# Patient Record
Sex: FEMALE | Race: WHITE | NOT HISPANIC OR LATINO | Employment: OTHER | URBAN - METROPOLITAN AREA
[De-identification: names, ages, dates, MRNs, and addresses within clinical notes are randomized per-mention and may not be internally consistent; named-entity substitution may affect disease eponyms.]

---

## 2017-01-30 DIAGNOSIS — I10 ESSENTIAL HYPERTENSION: ICD-10-CM

## 2017-01-30 RX ORDER — POTASSIUM CHLORIDE 750 MG/1
10 TABLET, EXTENDED RELEASE ORAL DAILY
Qty: 90 EACH | Refills: 1 | Status: SHIPPED | OUTPATIENT
Start: 2017-01-30 | End: 2017-07-18

## 2017-05-15 ENCOUNTER — HOSPITAL ENCOUNTER (OUTPATIENT)
Dept: LAB | Facility: MEDICAL CENTER | Age: 72
End: 2017-05-15
Attending: PHYSICIAN ASSISTANT
Payer: MEDICARE

## 2017-05-15 LAB
T4 FREE SERPL-MCNC: 1.17 NG/DL (ref 0.53–1.43)
TSH SERPL DL<=0.005 MIU/L-ACNC: 1.17 UIU/ML (ref 0.3–3.7)

## 2017-05-15 PROCEDURE — 84439 ASSAY OF FREE THYROXINE: CPT

## 2017-05-15 PROCEDURE — 84443 ASSAY THYROID STIM HORMONE: CPT

## 2017-05-15 PROCEDURE — 36415 COLL VENOUS BLD VENIPUNCTURE: CPT

## 2017-05-30 ENCOUNTER — TELEPHONE (OUTPATIENT)
Dept: MEDICAL GROUP | Facility: MEDICAL CENTER | Age: 72
End: 2017-05-30

## 2017-05-30 RX ORDER — SCOLOPAMINE TRANSDERMAL SYSTEM 1 MG/1
1 PATCH, EXTENDED RELEASE TRANSDERMAL
Qty: 4 PATCH | Refills: 0 | Status: SHIPPED | OUTPATIENT
Start: 2017-05-30 | End: 2017-05-31 | Stop reason: SDUPTHER

## 2017-05-30 NOTE — TELEPHONE ENCOUNTER
Pt is leaving on a cruise. Does not wish to take Dramamine because it makes her drowsy. Would like you to send patch to her pharmacy. Please advise.

## 2017-05-30 NOTE — TELEPHONE ENCOUNTER
Please notify patient I will send a prescription for the scopolamine patch for her pharmacy.  Have her schedule a Medicare wellness appointment, we have not seen her in over one year.

## 2017-05-31 ENCOUNTER — TELEPHONE (OUTPATIENT)
Dept: MEDICAL GROUP | Facility: MEDICAL CENTER | Age: 72
End: 2017-05-31

## 2017-05-31 RX ORDER — SCOLOPAMINE TRANSDERMAL SYSTEM 1 MG/1
1 PATCH, EXTENDED RELEASE TRANSDERMAL
Qty: 4 PATCH | Refills: 0 | Status: SHIPPED | OUTPATIENT
Start: 2017-05-31 | End: 2017-07-18

## 2017-05-31 NOTE — TELEPHONE ENCOUNTER
Patient called and left message  She would like the scopolamine patch sent to [facility], not [facility].  Please notify her that I have sent the scopolamine patch prescription to [facility]

## 2017-07-18 ENCOUNTER — OFFICE VISIT (OUTPATIENT)
Dept: MEDICAL GROUP | Facility: MEDICAL CENTER | Age: 72
End: 2017-07-18
Payer: MEDICARE

## 2017-07-18 VITALS
SYSTOLIC BLOOD PRESSURE: 128 MMHG | WEIGHT: 162 LBS | HEIGHT: 64 IN | BODY MASS INDEX: 27.66 KG/M2 | TEMPERATURE: 97.3 F | HEART RATE: 66 BPM | OXYGEN SATURATION: 95 % | DIASTOLIC BLOOD PRESSURE: 66 MMHG

## 2017-07-18 DIAGNOSIS — M85.80 OSTEOPENIA, UNSPECIFIED LOCATION: Chronic | ICD-10-CM

## 2017-07-18 DIAGNOSIS — D12.6 TUBULAR ADENOMA OF COLON: Chronic | ICD-10-CM

## 2017-07-18 DIAGNOSIS — M81.0 POSTMENOPAUSAL BONE LOSS: ICD-10-CM

## 2017-07-18 DIAGNOSIS — E55.9 HYPOVITAMINOSIS D: ICD-10-CM

## 2017-07-18 DIAGNOSIS — M54.5 LOW BACK PAIN, UNSPECIFIED BACK PAIN LATERALITY, UNSPECIFIED CHRONICITY, WITH SCIATICA PRESENCE UNSPECIFIED: ICD-10-CM

## 2017-07-18 DIAGNOSIS — Z85.850 HISTORY OF THYROID CANCER: Chronic | ICD-10-CM

## 2017-07-18 DIAGNOSIS — Z12.31 ENCOUNTER FOR SCREENING MAMMOGRAM FOR BREAST CANCER: ICD-10-CM

## 2017-07-18 DIAGNOSIS — Z00.00 MEDICARE ANNUAL WELLNESS VISIT, SUBSEQUENT: ICD-10-CM

## 2017-07-18 DIAGNOSIS — Z12.11 COLON CANCER SCREENING: ICD-10-CM

## 2017-07-18 DIAGNOSIS — E78.5 DYSLIPIDEMIA: Chronic | ICD-10-CM

## 2017-07-18 DIAGNOSIS — R35.1 NOCTURIA: ICD-10-CM

## 2017-07-18 DIAGNOSIS — Z00.00 PREVENTATIVE HEALTH CARE: Chronic | ICD-10-CM

## 2017-07-18 PROCEDURE — G0439 PPPS, SUBSEQ VISIT: HCPCS | Performed by: INTERNAL MEDICINE

## 2017-07-18 RX ORDER — ACYCLOVIR 400 MG/1
400 TABLET ORAL 2 TIMES DAILY
COMMUNITY

## 2017-07-18 ASSESSMENT — ENCOUNTER SYMPTOMS
WEIGHT LOSS: 0
BLURRED VISION: 0
HEADACHES: 0
DIZZINESS: 0
COUGH: 0
PALPITATIONS: 0
DEPRESSION: 0
DOUBLE VISION: 0
SHORTNESS OF BREATH: 0
BACK PAIN: 1
INSOMNIA: 0
ABDOMINAL PAIN: 0
HEARTBURN: 0
DIARRHEA: 0
CONSTIPATION: 0
NERVOUS/ANXIOUS: 0

## 2017-07-18 ASSESSMENT — PATIENT HEALTH QUESTIONNAIRE - PHQ9
SUM OF ALL RESPONSES TO PHQ QUESTIONS 1-9: 3
CLINICAL INTERPRETATION OF PHQ2 SCORE: 0

## 2017-07-18 NOTE — MR AVS SNAPSHOT
"        Ping Das   2017 8:20 AM   Office Visit   MRN: [id number]    Department:  [facility]   Dept Phone:  [phone]    Description:  Female : 1945   Provider:  Asael Redd M.D.           Reason for Visit     Annual Exam           Allergies as of 2017     Allergen Noted Reactions    Kenalog 05/15/2009       \"goes a little nuts\"      You were diagnosed with     Medicare annual wellness visit, subsequent   [751593]       Low back pain, unspecified back pain laterality, unspecified chronicity, with sciatica presence unspecified   [9005318]       Postmenopausal bone loss   [111033]       Encounter for screening mammogram for breast cancer   [2394311]       Tubular adenoma of colon   [121019]       Colon cancer screening   [516499]       Preventative health care   [491136]       Dyslipidemia   [372200]       Osteopenia, unspecified location   [4532873]       History of thyroid cancer   [139106]       Nocturia   [788.43.ICD-9-CM]       Hypovitaminosis D   [281480]         Vital Signs     Blood Pressure Pulse Temperature Height Weight Body Mass Index    128/66 mmHg 66 36.3 °C (97.3 °F) 1.626 m (5' 4\") 73.483 kg (162 lb) 27.79 kg/m2    Oxygen Saturation Smoking Status                95% Never Smoker           Basic Information     Date Of Birth Sex Race Ethnicity Preferred Language    1945 Female White Non- English      Problem List              ICD-10-CM Priority Class Noted - Resolved    History of thyroid cancer (Chronic) Z85.850   5/15/2009 - Present    Dyslipidemia (Chronic) E78.5   5/15/2009 - Present    History of depression Z86.59   5/15/2009 - Present    Tubular adenoma of colon (Chronic) D12.6   5/15/2009 - Present    Preventative health care (Chronic) Z00.00   5/15/2009 - Present    Osteopenia (Chronic) M85.80   2011 - Present    Ocular migraine G43.109   2013 - Present    History of compression fracture of spine Z87.81   3/7/2016 - Present    HSV " infection B00.9   5/10/2016 - Present      Health Maintenance        Date Due Completion Dates    COLONOSCOPY 4/30/2017 4/30/2012 (Done)    Override on 4/30/2012: Done    MAMMOGRAM 7/14/2017 7/14/2016, 6/12/2015, 8/28/2013, 1/19/2012, 12/7/2010, 10/22/2009, 10/22/2009, 10/17/2008, 10/17/2008, 2/20/2008, 2/20/2008, 6/6/2007, 5/21/2007, 5/21/2007    IMM INFLUENZA (1) 9/1/2017 11/23/2014    BONE DENSITY 6/12/2020 6/12/2015, 7/6/2011, 10/17/2008, 8/31/2004    IMM DTaP/Tdap/Td Vaccine (2 - Td) 1/19/2022 1/19/2012            Current Immunizations     13-VALENT PCV PREVNAR 2/5/2015    Influenza TIV (IM) 11/23/2014    Pneumococcal polysaccharide vaccine (PPSV-23) 1/19/2012    SHINGLES VACCINE 3/4/2014    Tdap Vaccine 1/19/2012      Below and/or attached are the medications your provider expects you to take. Review all of your home medications and newly ordered medications with your provider and/or pharmacist. Follow medication instructions as directed by your provider and/or pharmacist. Please keep your medication list with you and share with your provider. Update the information when medications are discontinued, doses are changed, or new medications (including over-the-counter products) are added; and carry medication information at all times in the event of emergency situations     Allergies:  KENALOG - (reactions not documented)               Medications  Valid as of: July 18, 2017 -  9:11 AM    Generic Name Brand Name Tablet Size Instructions for use    Acyclovir (Tab) ZOVIRAX 400 MG Take 400 mg by mouth 2 times a day.        Fluticasone Propionate (Suspension) FLONASE 50 MCG/ACT Spray 2 Sprays in nose every day.        Levothyroxine Sodium (Tab) SYNTHROID 100 MCG Take 100 mcg by mouth every day. Takes 1 1/2 tabs per day         Lovastatin (Tab) MEVACOR 40 MG Take 1 Tab by mouth every day.        Naproxen (Tab) NAPROSYN 500 MG Take 1 Tab by mouth 2 times a day as needed.        Potassium Chloride (Tab CR) KLOR-CON 10  MEQ Take 1 Tab by mouth every day.        .                 Medicines prescribed today were sent to:     [id number] - [place], [place] - [address] [place] [address]    Phone: [phone] Fax: [phone]    Open 24 Hours?: No      Medication refill instructions:       If your prescription bottle indicates you have medication refills left, it is not necessary to call your provider’s office. Please contact your pharmacy and they will refill your medication.    If your prescription bottle indicates you do not have any refills left, you may request refills at any time through one of the following ways: The online [employer] system (except Urgent Care), by calling your provider’s office, or by asking your pharmacy to contact your provider’s office with a refill request. Medication refills are processed only during regular business hours and may not be available until the next business day. Your provider may request additional information or to have a follow-up visit with you prior to refilling your medication.   *Please Note: Medication refills are assigned a new Rx number when refilled electronically. Your pharmacy may indicate that no refills were authorized even though a new prescription for the same medication is available at the pharmacy. Please request the medicine by name with the pharmacy before contacting your provider for a refill.        Your To Do List     Future Labs/Procedures Complete By Expires    CBC WITH DIFFERENTIAL  As directed 7/19/2018    COMP METABOLIC PANEL  As directed 7/19/2018    DS-BONE DENSITY STUDY (DEXA)  As directed 7/18/2018    LIPID PROFILE  As directed 7/19/2018    MA-MAMMO SCREENING BILAT W/GIOVANNI W/CAD  As directed 7/18/2018    URINALYSIS,CULTURE IF INDICATED  As directed 7/19/2018    VITAMIN D,25 HYDROXY  As directed 7/19/2018      Referral     A referral request has been sent to our patient care coordination department. Please allow 3-5 business days for  us to process this request and contact you either by phone or mail. If you do not hear from us by the 5th business day, please call us at [phone].        Other Notes About Your Plan     Need advance directive copy  7/18/17 [facility], mammogram, bone density, colon cancer screening pending                           [employer] Access Code: Activation code not generated  Current [employer] Status: Active

## 2017-07-18 NOTE — PROGRESS NOTES
Subjective:      Ping Das is a 71 y.o. female who presents with medicare wellness          Annual Exam  Pertinent negatives include no abdominal pain, chest pain, coughing or headaches.     CC:  Health Risk Assessment Exam.    HPI:  Ping is a 71 y.o. here for Health Risk Assessment.     PCP: Asael Redd M.D.  Other specialists:  endocrine  Sees gyn   GI: [facility]  Ophthal: uses glasses for reading and distance  No hearing loss   Teeth cleaning twice per year      Patient Active Problem List    Diagnosis Date Noted   • HSV infection 05/10/2016   • Thoracic compression fracture (CMS-HCC) 03/07/2016   • Ocular migraine 01/24/2013   • Osteopenia 07/06/2011   • History of thyroid cancer 05/15/2009   • Dyslipidemia 05/15/2009   • History of depression 05/15/2009   • Tubular adenoma of colon 05/15/2009   • Preventative health care 05/15/2009     Current Outpatient Prescriptions   Medication Sig Dispense Refill   • acyclovir (ZOVIRAX) 400 MG tablet Take 400 mg by mouth 2 times a day.     • potassium chloride ER (KLOR-CON) 10 MEQ tablet Take 1 Tab by mouth every day. 90 Tab 0   • lovastatin (MEVACOR) 40 MG tablet Take 1 Tab by mouth every day. 90 Tab 0   • naproxen (NAPROSYN) 500 MG Tab Take 1 Tab by mouth 2 times a day as needed. 60 Tab 5   • fluticasone (FLONASE) 50 MCG/ACT nasal spray Spray 2 Sprays in nose every day. 48 g 6   • levothyroxine (SYNTHROID) 100 MCG TABS Take 100 mcg by mouth every day. Takes 1 1/2 tabs per day      • scopolamine (TRANSDERM-SCOP) 1.5 MG/3DAYS PATCH 72 HR Apply 1 Patch to skin as directed every 72 hours. 4 Patch 0   • potassium chloride SA (K-DUR) 10 MEQ Tab CR Take 1 Tab by mouth every day. 90 Each 1   • naproxen (NAPROSYN) 500 MG Tab Take 500 mg by mouth 2 times a day, with meals.       No current facility-administered medications for this visit.      Current supplements: reviewed  Chronic narcotic pain medicines: no  Allergies:  Kenalog    Screening:reviewed  Depressive Symptoms: Denies feeling down, depressed or hopeless. Denies loss of interest or pleasure in doing things   ADLs: Denies needing help with using telephone, transportation, shopping, preparing meals, housework, laundry, or managing medication or money   Independent with bathing, hygiene, feeding, toileting, dressing    Memory concerns: Denies difficulty remembering details of conversations, events and upcoming appointments.  Hearing problems: Denies.   Recent falls no    SH no tobacco, occ etoh wine one per day at most, lives by self, does eat some sweets  FH one daughter in [place]   Current social contact/activities: reviewed , no regular exercise      Social History   Substance Use Topics   • Smoking status: Never Smoker    • Smokeless tobacco: Never Used   • Alcohol Use: 0.0 oz/week     0 Standard drinks or equivalent per week      Comment: occasional       Family History   Problem Relation Age of Onset   • Cancer Mother      unknown primary   • Heart Disease Sister      Past Surgical History   Procedure Laterality Date   • Other  Nov 2006     cataract   • Other  August 2007     thyroid   • Other  July 2006     Skin /face   • Vitrectomy posterior  11/17/2011     Performed by TRACEY RAMON at [facility]        Ostomy or other tubes or amputations: no  Chronic oxygen use no  Gait: normal. Uses assistive device no         Health Care Screening recommendations reviewed with patient today and updated or ordered.  DPA/Advanced directive: Completed/Information provided.  Referrals for PT/OT/Nutrition counseling/Behavioral Health/Smoking cessation as above if indicated  Discussion today about general wellness and lifestyle habits:    · Prevent falls and reduce trip hazards  · Have a working fire alarm and carbon monoxide detector  · Engage in regular physical activity and social activities  · Use sun protection when outdoors.   ·   ·         Current  Outpatient Prescriptions   Medication Sig Dispense Refill   • acyclovir (ZOVIRAX) 400 MG tablet Take 400 mg by mouth 2 times a day.     • potassium chloride ER (KLOR-CON) 10 MEQ tablet Take 1 Tab by mouth every day. 90 Tab 0   • lovastatin (MEVACOR) 40 MG tablet Take 1 Tab by mouth every day. 90 Tab 0   • naproxen (NAPROSYN) 500 MG Tab Take 1 Tab by mouth 2 times a day as needed. 60 Tab 5   • fluticasone (FLONASE) 50 MCG/ACT nasal spray Spray 2 Sprays in nose every day. 48 g 6   • levothyroxine (SYNTHROID) 100 MCG TABS Take 100 mcg by mouth every day. Takes 1 1/2 tabs per day      • scopolamine (TRANSDERM-SCOP) 1.5 MG/3DAYS PATCH 72 HR Apply 1 Patch to skin as directed every 72 hours. 4 Patch 0   • potassium chloride SA (K-DUR) 10 MEQ Tab CR Take 1 Tab by mouth every day. 90 Each 1   • naproxen (NAPROSYN) 500 MG Tab Take 500 mg by mouth 2 times a day, with meals.       No current facility-administered medications for this visit.     Patient Active Problem List    Diagnosis Date Noted   • HSV infection 05/10/2016   • Thoracic compression fracture (CMS-HCC) 03/07/2016   • Ocular migraine 01/24/2013   • Osteopenia 07/06/2011   • History of thyroid cancer 05/15/2009   • Dyslipidemia 05/15/2009   • History of depression 05/15/2009   • Tubular adenoma of colon 05/15/2009   • Preventative health care 05/15/2009         Tubular Adenoma  2004 no records path unknown  4/30/12 GI colon tubular adenoma, repeat 5 yrs    Thoracic compression fracture  11/10/04 thoracic x-ray negative  3/4/14 cxr mid apex right lateral curvature thoracic spine may be positional or from mild scoliosis  3/7/16 cxr mid and upper thoracic spine compression fracture    Preventative health  7/11 pap per gyn   1/19/12 pneumovax  1/12 tdap  4/30/12 GI colon tubular adenoma, repeat 5 yrs  3/5/14 zoster vaccine  2/5/15 prevnar  6/12/15 dexa LS -0.6,hip -1.3;FRAX 10.5% major,hip 1.7%  5/17/16 vit d 29 take extra 2000 units daily  7/15/16 mammogram  heterogeneously dense breast tissue offered sonocine  8/31/16 sonocine negative    Osteopenia  10/08 dexa LS -1.0, hip -0.4  7/11 dexa LS -0.5,hip -1.2  2/13 vit d 50  3/15/14 vit d 31  3/25/15 vit d 33  6/12/15 dexa LS -0.6,hip -1.3;FRAX 10.5% major,hip 1.7%  3/7/16 cxr mid and upper thoracic spine compression fracture  5/17/16 vit d 29 take extra 2000 units daily    Ocular migraine    HSV acyclovir as needed    History thyroid cancer  8/07 s/p thyroidectomy 1.1 cm no evid of adenopathy sees   3/11 dr.abbott saunders note u/s thyroid in june, on levoxyl 100 mg  6/11 tsh 0.33, on levoxyl 100 mcg  1/12 tsh 3.9 on levoxyl 100 mcg followed by   8/12 dr.abbott saunders note, tsh 0.05,free t4 1.46, thyroglobulin <0.9, decrease levothyroxine to 100 mcg. Repeat thyroid ultrasound February 1/13 tsh 0.1, thyroglobulin less than 0.1; dr.abbott saunders note no changes  1/29/14 tsh 0.17,free t4 1.0,thyroglobulin <0.1 on levothyroxine 100 mcg  2/25/14 tsh 0.12, free t4, free t4 1.2,thyroglobulin 0.1 on levothyroxine 100 mcg  9/5/14 tsh 0.13,free t4 1.1 on levothyroxine 100 mcg one pill six days per week ,1/2 pill on day 7  9/20/14  endocrine note  1/6/15 tsh 1.5,free t4 0.99 on levothyroxine 100 mcg 6 days per week and 75 mcg one day per week  1/13/15  endocrine note  3/24/15 tsh 0.3,free t4 1.5 on levothyroxine 100 mcg 6 days per week  4/2/15  endocrine note continue same dose levothyroxine and follow up 6 months  9/30/15 tsh 0.09,free t4 1.3, thyroglobulin<0.1, antithyroglobulin antibody less<0.9; on synthroid 100 mcg 6 days a week, synthroid 75 mcg one day per week  10/6/15  endocrinology note decrease total thyroid dose by 25 mcg during the week  5/17/16 tsh 0.6,free t4 0.9 on synthroid 100 mcg 6 days per week and 50 mcg 1 day per week? Per  endocrinology  5/15/17 tsh 1.1,free t4 1.1 on levothyroxine 100 mcg six days per week  5/18/17  endocrine note change to  levothyroxine 75 mcg daily     History depression  Since 2001, having failed celexa and effexor?, lexapro working well  4/10 samples cymbalta  6/11 off cymbalta  7/24/14 trial wellbutrin 150 mg, PHQ-9 17  8/19/14 behavioral health note; pt will follow up with psychotherapy  9/22/14 increase wellbutrin to 300 mgdepression  10/21/14 PHQ 14  10/21/14 decrease wellbutrin to 150 mg and add effexor XR 75 mg qday  1/25/15 off wellbutrin    Dyslipidemia  2/09 chol 204,trig 130, hdl 62, ldl 116  5/10 chol 142,trig 63,hdl 64,ldl 65  6/11 chol 120,trig 65,hdl 68,ldl 39 on mevacor and niaspan  1/12 chol 98,trig 55,hdl 40,ldl 47 on mevacor and niaspan; ok to stop niaspan  2/13 chol 172,trig 314,hdl 45,ldl 64 on mevacor 40 mg; start fish oil 2 bid   3/15/14 chol 198,trig 101,hdl 54,ldl 124 on mevacor 40 mg and fish oil  7/29/14 dietary evaluation and education  3/25/15 chol 175,trig 107,hdl 54,ldl 100 on mevacor 40 mg  5/17/16 chol 196,trig 105,hdl 52,ldl 123 on mevacor 40 mg      Depression Screening    Little interest or pleasure in doing things?  0 - not at all  Feeling down, depressed , or hopeless? 0 - not at all        Screening for Cognitive Impairment    Three Minute Recall (apple, watch, pierre)   3/3    Draw clock face with all 12 numbers set to the hand to show 10 minutes past 11 o'clock  1    Cognitive concerns identified deferred for follow up unless specifically addressed in assessment and plan.    Fall Risk Assessment    Has the patient had two or more falls in the last year or any fall with injury in the last year?  No    Safety Assessment    Throw rugs on floor.  Yes  Handrails on all stairs.  No  Good lighting in all hallways.  Yes  Difficulty hearing.  No  Patient counseled about all safety risks that were identified.    Functional Assessment ADLs    Are there any barriers preventing you from cooking for yourself or meeting nutritional needs?  No.    Are there any barriers preventing you from driving safely or  "obtaining transportation?  No.    Are there any barriers preventing you from using a telephone or calling for help?  No.    Are there any barriers preventing you from shopping?  No.    Are there any barriers preventing you from taking care of your own finances?  No.    Are there any barriers preventing you from managing your medications?  No.    Are currently engaging any exercise or physical activity?  No.       Health Maintenance Summary                COLONOSCOPY Overdue 4/30/2017      Done 4/30/2012     Annual Wellness Visit Overdue 5/11/2017      Done 5/10/2016 Visit Dx: Medicare annual wellness visit, subsequent    MAMMOGRAM Overdue 7/14/2017      Done 7/14/2016 MA-SCREEN MAMMO W/CAD-BILAT     Patient has more history with this topic...    IMM INFLUENZA Next Due 9/1/2017      Done 11/23/2014 Imm Admin: Influenza TIV (IM)    BONE DENSITY Next Due 6/12/2020      Done 6/12/2015 DS-BONE DENSITY STUDY (DEXA)     Patient has more history with this topic...    IMM DTaP/Tdap/Td Vaccine Next Due 1/19/2022      Done 1/19/2012 Imm Admin: Tdap Vaccine          Patient Care Team:  Asael Redd M.D. as PCP - General    Review of Systems   Constitutional: Negative for weight loss and malaise/fatigue.   Eyes: Negative for blurred vision and double vision.   Respiratory: Negative for cough and shortness of breath.    Cardiovascular: Negative for chest pain and palpitations.   Gastrointestinal: Negative for heartburn, abdominal pain, diarrhea and constipation.   Genitourinary: Negative for frequency.   Musculoskeletal: Positive for back pain.   Neurological: Negative for dizziness and headaches.   Endo/Heme/Allergies: Negative for environmental allergies.   Psychiatric/Behavioral: Negative for depression. The patient is not nervous/anxious and does not have insomnia.           Objective:     /66 mmHg  Pulse 66  Temp(Src) 36.3 °C (97.3 °F)  Ht 1.626 m (5' 4\")  Wt 73.483 kg (162 lb)  BMI 27.79 kg/m2  SpO2 95% "     Physical Exam   Constitutional: She appears well-developed and well-nourished. No distress.   HENT:   Head: Normocephalic and atraumatic.   Right Ear: External ear normal.   Left Ear: External ear normal.   Eyes: Conjunctivae are normal. Right eye exhibits no discharge. Left eye exhibits no discharge.   Neck: Neck supple. No JVD present. No thyromegaly present.   Cardiovascular: Normal rate and regular rhythm.    No murmur heard.  Pulmonary/Chest: No respiratory distress. She has no wheezes.   Abdominal: She exhibits no distension.   Musculoskeletal: She exhibits no edema.   Neurological: She is alert.   Skin: Skin is warm. She is not diaphoretic.   Psychiatric: She has a normal mood and affect. Her behavior is normal.   Nursing note and vitals reviewed.              Assessment/Plan:       Assessment  #! Wellness assessment    #2 Osteopenia last bone density 6/12/15 dexa LS -0.6,hip -1.3;FRAX 10.5% major,hip 1.7% with history of compression fracture 3/7/16 cxr mid and upper thoracic spine compression fracture, most recent vitamin D level 5/17/16 vit d 29 take extra 2000 units daily, due for bone density    #3 History thyroid cancer followed by endocrinology 8/07 s/p thyroidectomy 1.1 cm no evid of adenopathy sees  5/15/17 tsh 1.1,free t4 1.1 on levothyroxine 100 mcg six days per week 5/18/17  endocrine note change to levothyroxine 75 mcg daily from most recent visit stable     #4 Dyslipidemia most recent lipid panel 5/17/16 chol 196,trig 105,hdl 52,ldl 123 on mevacor 40 mg, no recent labs, no muscle pains or aches on statin    #5 Tubular Adenoma 4/30/12 [facility] colon tubular adenoma, repeat 5 yrs, no abdominal pain, melena or hematochezia     #6 Preventative health  7/11 pap per gyn   1/19/12 pneumovax  1/12 tdap  4/30/12 [facility] colon tubular adenoma, repeat 5 yrs  3/5/14 zoster vaccine  2/5/15 prevnar  6/12/15 dexa LS -0.6,hip -1.3;FRAX 10.5% major,hip 1.7%  5/17/16 vit d 29 take extra 2000  units daily  7/15/16 mammogram heterogeneously dense breast tissue offered sonocine  8/31/16 sonocine negative    #7 occasional low back pain      Plan  #! Does have advanced directive and medical power of  she will drop off a copy for us    #2 referral [facility] back pain PT    #3 declines hearing test    #4 colon cancer screening [facility] due    #5 labs    #6 mammogram and dexa    #7 lifestyle modification, diet, exercise discussed    #8 follow-up endocrinology    #9 follow-up gynecology    #10 continue Mevacor pending labs    #11 continue Synthroid per endocrine    #12 follow-up one year

## 2017-08-22 ENCOUNTER — HOSPITAL ENCOUNTER (OUTPATIENT)
Dept: LAB | Facility: MEDICAL CENTER | Age: 72
End: 2017-08-22
Attending: INTERNAL MEDICINE
Payer: MEDICARE

## 2017-08-22 LAB
T4 FREE SERPL-MCNC: 1.03 NG/DL (ref 0.53–1.43)
TSH SERPL DL<=0.005 MIU/L-ACNC: 1.66 UIU/ML (ref 0.3–3.7)

## 2017-08-22 PROCEDURE — 84439 ASSAY OF FREE THYROXINE: CPT

## 2017-08-22 PROCEDURE — 36415 COLL VENOUS BLD VENIPUNCTURE: CPT

## 2017-08-22 PROCEDURE — 84443 ASSAY THYROID STIM HORMONE: CPT

## 2017-09-05 ENCOUNTER — HOSPITAL ENCOUNTER (OUTPATIENT)
Dept: LAB | Facility: MEDICAL CENTER | Age: 72
End: 2017-09-05
Attending: INTERNAL MEDICINE
Payer: MEDICARE

## 2017-09-05 ENCOUNTER — TELEPHONE (OUTPATIENT)
Dept: MEDICAL GROUP | Facility: MEDICAL CENTER | Age: 72
End: 2017-09-05

## 2017-09-05 ENCOUNTER — HOSPITAL ENCOUNTER (OUTPATIENT)
Dept: RADIOLOGY | Facility: MEDICAL CENTER | Age: 72
End: 2017-09-05
Attending: INTERNAL MEDICINE
Payer: MEDICARE

## 2017-09-05 DIAGNOSIS — Z12.31 ENCOUNTER FOR SCREENING MAMMOGRAM FOR BREAST CANCER: ICD-10-CM

## 2017-09-05 DIAGNOSIS — E55.9 HYPOVITAMINOSIS D: ICD-10-CM

## 2017-09-05 DIAGNOSIS — M81.0 POSTMENOPAUSAL BONE LOSS: ICD-10-CM

## 2017-09-05 DIAGNOSIS — R35.1 NOCTURIA: ICD-10-CM

## 2017-09-05 DIAGNOSIS — E78.5 DYSLIPIDEMIA: Chronic | ICD-10-CM

## 2017-09-05 LAB
25(OH)D3 SERPL-MCNC: 42 NG/ML (ref 30–100)
ALBUMIN SERPL BCP-MCNC: 4.2 G/DL (ref 3.2–4.9)
ALBUMIN/GLOB SERPL: 1.4 G/DL
ALP SERPL-CCNC: 69 U/L (ref 30–99)
ALT SERPL-CCNC: 14 U/L (ref 2–50)
ANION GAP SERPL CALC-SCNC: 7 MMOL/L (ref 0–11.9)
APPEARANCE UR: CLEAR
AST SERPL-CCNC: 17 U/L (ref 12–45)
BACTERIA #/AREA URNS HPF: ABNORMAL /HPF
BASOPHILS # BLD AUTO: 0.7 % (ref 0–1.8)
BASOPHILS # BLD: 0.05 K/UL (ref 0–0.12)
BILIRUB SERPL-MCNC: 0.6 MG/DL (ref 0.1–1.5)
BILIRUB UR QL STRIP.AUTO: NEGATIVE
BUN SERPL-MCNC: 15 MG/DL (ref 8–22)
CALCIUM SERPL-MCNC: 9.2 MG/DL (ref 8.5–10.5)
CHLORIDE SERPL-SCNC: 103 MMOL/L (ref 96–112)
CHOLEST SERPL-MCNC: 206 MG/DL (ref 100–199)
CO2 SERPL-SCNC: 28 MMOL/L (ref 20–33)
COLOR UR: YELLOW
CREAT SERPL-MCNC: 0.55 MG/DL (ref 0.5–1.4)
CULTURE IF INDICATED INDCX: YES UA CULTURE
EOSINOPHIL # BLD AUTO: 0.12 K/UL (ref 0–0.51)
EOSINOPHIL NFR BLD: 1.6 % (ref 0–6.9)
EPI CELLS #/AREA URNS HPF: ABNORMAL /HPF
ERYTHROCYTE [DISTWIDTH] IN BLOOD BY AUTOMATED COUNT: 42.5 FL (ref 35.9–50)
FASTING STATUS PATIENT QL REPORTED: NORMAL
GFR SERPL CREATININE-BSD FRML MDRD: >60 ML/MIN/1.73 M 2
GLOBULIN SER CALC-MCNC: 2.9 G/DL (ref 1.9–3.5)
GLUCOSE SERPL-MCNC: 101 MG/DL (ref 65–99)
GLUCOSE UR STRIP.AUTO-MCNC: NEGATIVE MG/DL
HCT VFR BLD AUTO: 44.2 % (ref 37–47)
HDLC SERPL-MCNC: 48 MG/DL
HGB BLD-MCNC: 15.1 G/DL (ref 12–16)
HYALINE CASTS #/AREA URNS LPF: ABNORMAL /LPF
IMM GRANULOCYTES # BLD AUTO: 0.02 K/UL (ref 0–0.11)
IMM GRANULOCYTES NFR BLD AUTO: 0.3 % (ref 0–0.9)
KETONES UR STRIP.AUTO-MCNC: NEGATIVE MG/DL
LDLC SERPL CALC-MCNC: 130 MG/DL
LEUKOCYTE ESTERASE UR QL STRIP.AUTO: ABNORMAL
LYMPHOCYTES # BLD AUTO: 2.1 K/UL (ref 1–4.8)
LYMPHOCYTES NFR BLD: 28.2 % (ref 22–41)
MCH RBC QN AUTO: 30.4 PG (ref 27–33)
MCHC RBC AUTO-ENTMCNC: 34.2 G/DL (ref 33.6–35)
MCV RBC AUTO: 88.9 FL (ref 81.4–97.8)
MICRO URNS: ABNORMAL
MONOCYTES # BLD AUTO: 0.59 K/UL (ref 0–0.85)
MONOCYTES NFR BLD AUTO: 7.9 % (ref 0–13.4)
NEUTROPHILS # BLD AUTO: 4.56 K/UL (ref 2–7.15)
NEUTROPHILS NFR BLD: 61.3 % (ref 44–72)
NITRITE UR QL STRIP.AUTO: NEGATIVE
NRBC # BLD AUTO: 0 K/UL
NRBC BLD AUTO-RTO: 0 /100 WBC
PH UR STRIP.AUTO: 5.5 [PH]
PLATELET # BLD AUTO: 287 K/UL (ref 164–446)
PMV BLD AUTO: 10 FL (ref 9–12.9)
POTASSIUM SERPL-SCNC: 3.9 MMOL/L (ref 3.6–5.5)
PROT SERPL-MCNC: 7.1 G/DL (ref 6–8.2)
PROT UR QL STRIP: NEGATIVE MG/DL
RBC # BLD AUTO: 4.97 M/UL (ref 4.2–5.4)
RBC # URNS HPF: ABNORMAL /HPF
RBC UR QL AUTO: NEGATIVE
SODIUM SERPL-SCNC: 138 MMOL/L (ref 135–145)
SP GR UR STRIP.AUTO: 1.03
TRIGL SERPL-MCNC: 139 MG/DL (ref 0–149)
UROBILINOGEN UR STRIP.AUTO-MCNC: 0.2 MG/DL
WBC # BLD AUTO: 7.4 K/UL (ref 4.8–10.8)
WBC #/AREA URNS HPF: ABNORMAL /HPF

## 2017-09-05 PROCEDURE — G0202 SCR MAMMO BI INCL CAD: HCPCS

## 2017-09-05 PROCEDURE — 77080 DXA BONE DENSITY AXIAL: CPT

## 2017-09-06 ENCOUNTER — TELEPHONE (OUTPATIENT)
Dept: MEDICAL GROUP | Facility: MEDICAL CENTER | Age: 72
End: 2017-09-06

## 2017-09-06 DIAGNOSIS — R92.2 DENSE BREAST: ICD-10-CM

## 2017-09-06 DIAGNOSIS — R92.30 DENSE BREAST: ICD-10-CM

## 2017-09-06 NOTE — TELEPHONE ENCOUNTER
----- Message from Asael Redd M.D. sent at 9/6/2017 11:00 AM PDT -----  Please notify the patient that:  (1) her mammogram is negative but does show dense breast tissue which may decrease the accuracy of the mammogram  (2) she can get a screening ultrasound of her breast which may provide further detail  (3) her insurance will not cover a screening breast ultrasound, she would have to pay out of pocket, the cost would be approximately $125  (4) she did have a screening breast ultrasound last year which was negative, I will leave that up to her if she would like to repeat that test however given that she did have the test negative one year ago, I believe she can defer a screening ultrasound this year

## 2017-09-06 NOTE — TELEPHONE ENCOUNTER
Called patient and left message twice  Please notify her that her bone density test shows:  (1) no significant change from her previous bone density test  (2) the strength of her spine is normal, she has a slight decrease in strength of her hip bone but it is not significant  (3) have her continue the vitamin D, exercise, and we will repeat her bone density test in 2 years

## 2017-09-06 NOTE — TELEPHONE ENCOUNTER
Please notify the patient that:  (1) her mammogram is negative but does show dense breast tissue which may decrease the accuracy of the mammogram  (2) she can get a screening ultrasound of her breast which may provide further detail  (3) her insurance will not cover a screening breast ultrasound, she would have to pay out of pocket, the cost would be approximately $125  (4) she did have a screening breast ultrasound last year which was negative, I will leave that up to her if she would like to repeat that test however given that she did have the test negative one year ago, I believe she can defer a screening ultrasound this year

## 2017-09-06 NOTE — TELEPHONE ENCOUNTER
Called patient and left message x 2  Please notify her that her tests show:  (1) normal liver function, kidney function, and her blood sugar is minimally elevated at 101 (normal is 100 or less)  (2) total cholesterol is 206, good cholesterol is 48 (goal is higher than 40), bad cholesterol is 130 (goal is 100 or less), her cholesterol has continued to increase over the last 2 years.  I would recommend trying a slightly stronger cholesterol medication to see if we can decrease her bad cholesterol closer to 100.  (3) if she is interested let me know, and I will send a prescription for Lipitor to her pharmacy in place of the Mevacor  (4) her vitamin D level is normal  (5) her urine test shows some white blood cells which may indicate an infection, we are awaiting the final results

## 2017-09-07 ENCOUNTER — TELEPHONE (OUTPATIENT)
Dept: MEDICAL GROUP | Facility: MEDICAL CENTER | Age: 72
End: 2017-09-07

## 2017-09-07 DIAGNOSIS — R31.29 MICROSCOPIC HEMATURIA: ICD-10-CM

## 2017-09-07 LAB
BACTERIA UR CULT: NORMAL
SIGNIFICANT IND 70042: NORMAL
SOURCE SOURCE: NORMAL

## 2017-09-07 NOTE — TELEPHONE ENCOUNTER
----- Message from Asael Redd M.D. sent at 9/7/2017 12:21 PM PDT -----  Please notify patient that her urine culture is negative    I would like to repeat her urine test in 2 weeks, she can go to the lab at her convenience, the order is in the system

## 2017-09-07 NOTE — TELEPHONE ENCOUNTER
Please notify patient that her urine culture is negative    I would like to repeat her urine test in 2 weeks, she can go to the lab at her convenience, the order is in the system

## 2017-09-08 DIAGNOSIS — E78.5 DYSLIPIDEMIA: Chronic | ICD-10-CM

## 2017-09-08 RX ORDER — ATORVASTATIN CALCIUM 20 MG/1
20 TABLET, FILM COATED ORAL DAILY
Qty: 30 TAB | Refills: 3 | Status: SHIPPED | OUTPATIENT
Start: 2017-09-08 | End: 2018-01-23 | Stop reason: SDUPTHER

## 2017-09-08 NOTE — TELEPHONE ENCOUNTER
Pt called back, notified of culture coming back negative. Pt will go to the lab in two weeks to retest.

## 2017-09-08 NOTE — TELEPHONE ENCOUNTER
Please notify the patient that I sent a prescription for Lipitor to her pharmacy for one month, she can repeat her blood test at that time, the orders fasting are in the computer system, if her cholesterol looks better and she has no side effects with the medication, at that time we could consider changing to a 90 day supply

## 2017-09-25 ENCOUNTER — APPOINTMENT (RX ONLY)
Dept: URBAN - METROPOLITAN AREA CLINIC 4 | Facility: CLINIC | Age: 72
Setting detail: DERMATOLOGY
End: 2017-09-25

## 2017-09-25 DIAGNOSIS — D18.0 HEMANGIOMA: ICD-10-CM

## 2017-09-25 DIAGNOSIS — D22 MELANOCYTIC NEVI: ICD-10-CM

## 2017-09-25 DIAGNOSIS — L81.4 OTHER MELANIN HYPERPIGMENTATION: ICD-10-CM

## 2017-09-25 DIAGNOSIS — L57.0 ACTINIC KERATOSIS: ICD-10-CM

## 2017-09-25 DIAGNOSIS — L90.5 SCAR CONDITIONS AND FIBROSIS OF SKIN: ICD-10-CM

## 2017-09-25 DIAGNOSIS — L82.1 OTHER SEBORRHEIC KERATOSIS: ICD-10-CM

## 2017-09-25 PROBLEM — D23.71 OTHER BENIGN NEOPLASM OF SKIN OF RIGHT LOWER LIMB, INCLUDING HIP: Status: ACTIVE | Noted: 2017-09-25

## 2017-09-25 PROBLEM — C44.712 BASAL CELL CARCINOMA OF SKIN OF RIGHT LOWER LIMB, INCLUDING HIP: Status: ACTIVE | Noted: 2017-09-25

## 2017-09-25 PROBLEM — D22.9 MELANOCYTIC NEVI, UNSPECIFIED: Status: ACTIVE | Noted: 2017-09-25

## 2017-09-25 PROBLEM — D18.01 HEMANGIOMA OF SKIN AND SUBCUTANEOUS TISSUE: Status: ACTIVE | Noted: 2017-09-25

## 2017-09-25 PROCEDURE — 11100: CPT | Mod: 59

## 2017-09-25 PROCEDURE — ? OBSERVATION

## 2017-09-25 PROCEDURE — ? BIOPSY BY SHAVE METHOD

## 2017-09-25 PROCEDURE — 17000 DESTRUCT PREMALG LESION: CPT

## 2017-09-25 PROCEDURE — 99213 OFFICE O/P EST LOW 20 MIN: CPT | Mod: 25

## 2017-09-25 PROCEDURE — ? COUNSELING

## 2017-09-25 PROCEDURE — ? LIQUID NITROGEN

## 2017-09-25 PROCEDURE — ? DIAGNOSIS COMMENT

## 2017-09-25 PROCEDURE — 17003 DESTRUCT PREMALG LES 2-14: CPT

## 2017-09-25 ASSESSMENT — LOCATION ZONE DERM
LOCATION ZONE: NECK
LOCATION ZONE: LEG
LOCATION ZONE: TRUNK

## 2017-09-25 ASSESSMENT — LOCATION DETAILED DESCRIPTION DERM
LOCATION DETAILED: LEFT CLAVICULAR NECK
LOCATION DETAILED: RIGHT ANTERIOR PROXIMAL THIGH
LOCATION DETAILED: RIGHT DISTAL LATERAL POSTERIOR THIGH
LOCATION DETAILED: LEFT POSTERIOR ANKLE
LOCATION DETAILED: LEFT SUPERIOR UPPER BACK
LOCATION DETAILED: RIGHT PROXIMAL PRETIBIAL REGION

## 2017-09-25 ASSESSMENT — LOCATION SIMPLE DESCRIPTION DERM
LOCATION SIMPLE: RIGHT PRETIBIAL REGION
LOCATION SIMPLE: LEFT ANKLE
LOCATION SIMPLE: LEFT ANTERIOR NECK
LOCATION SIMPLE: RIGHT POSTERIOR THIGH
LOCATION SIMPLE: RIGHT THIGH
LOCATION SIMPLE: LEFT UPPER BACK

## 2017-09-25 NOTE — PROCEDURE: BIOPSY BY SHAVE METHOD
Anesthesia Type: 1% lidocaine with epinephrine
Notification Instructions: Patient will be notified of biopsy results. However, patient instructed to call the office if not contacted within 2 weeks.
Type Of Destruction Used: Cryotherapy
Electrodesiccation And Curettage Text: The wound bed was treated with electrodesiccation and curettage after the biopsy was performed.
Biopsy Type: H and E
Wound Care: Petrolatum
Destruction After The Procedure: No
Additional Anesthesia Volume In Cc (Will Not Render If 0): 0
Detail Level: Detailed
Hemostasis: Drysol
Cryotherapy Text: The wound bed was treated with cryotherapy after the biopsy was performed.
Billing Type: Third-Party Bill
Electrodesiccation Text: The wound bed was treated with electrodesiccation after the biopsy was performed.
Lab: [id number]
Biopsy Method: Personna blade
Silver Nitrate Text: The wound bed was treated with silver nitrate after the biopsy was performed.
Curettage Text: The wound bed was treated with curettage after the biopsy was performed.
Consent: Written consent was obtained and risks were reviewed including but not limited to scarring, infection, bleeding, scabbing, incomplete removal, nerve damage and allergy to anesthesia.
Size Of Lesion In Cm: 7
Dressing: bandage
Post-Care Instructions: I reviewed with the patient in detail post-care instructions. Patient is to keep the biopsy site dry overnight, and then apply bacitracin twice daily until healed. Patient may apply hydrogen peroxide soaks to remove any crusting.

## 2017-09-26 ENCOUNTER — TELEPHONE (OUTPATIENT)
Dept: RADIOLOGY | Facility: MEDICAL CENTER | Age: 72
End: 2017-09-26

## 2017-09-27 ENCOUNTER — HOSPITAL ENCOUNTER (OUTPATIENT)
Dept: LAB | Facility: MEDICAL CENTER | Age: 72
End: 2017-09-27
Attending: INTERNAL MEDICINE
Payer: MEDICARE

## 2017-09-27 ENCOUNTER — HOSPITAL ENCOUNTER (OUTPATIENT)
Dept: RADIOLOGY | Facility: MEDICAL CENTER | Age: 72
End: 2017-09-27
Attending: INTERNAL MEDICINE
Payer: COMMERCIAL

## 2017-09-27 ENCOUNTER — TELEPHONE (OUTPATIENT)
Dept: MEDICAL GROUP | Facility: MEDICAL CENTER | Age: 72
End: 2017-09-27

## 2017-09-27 DIAGNOSIS — R31.29 MICROSCOPIC HEMATURIA: ICD-10-CM

## 2017-09-27 DIAGNOSIS — R92.2 DENSE BREAST: ICD-10-CM

## 2017-09-27 DIAGNOSIS — R92.30 DENSE BREAST: ICD-10-CM

## 2017-09-27 LAB
APPEARANCE UR: CLEAR
BACTERIA #/AREA URNS HPF: NEGATIVE /HPF
BILIRUB UR QL STRIP.AUTO: NEGATIVE
COLOR UR: YELLOW
EPI CELLS #/AREA URNS HPF: ABNORMAL /HPF
GLUCOSE UR STRIP.AUTO-MCNC: NEGATIVE MG/DL
HYALINE CASTS #/AREA URNS LPF: ABNORMAL /LPF
KETONES UR STRIP.AUTO-MCNC: NEGATIVE MG/DL
LEUKOCYTE ESTERASE UR QL STRIP.AUTO: ABNORMAL
MICRO URNS: ABNORMAL
NITRITE UR QL STRIP.AUTO: NEGATIVE
PH UR STRIP.AUTO: 5 [PH]
PROT UR QL STRIP: NEGATIVE MG/DL
RBC # URNS HPF: ABNORMAL /HPF
RBC UR QL AUTO: NEGATIVE
SP GR UR STRIP.AUTO: 1.02
UROBILINOGEN UR STRIP.AUTO-MCNC: 0.2 MG/DL
WBC #/AREA URNS HPF: ABNORMAL /HPF

## 2017-09-27 PROCEDURE — 4410555 US-SCREENING BREAST (SONOCINE - SP)

## 2017-09-27 PROCEDURE — 81001 URINALYSIS AUTO W/SCOPE: CPT

## 2017-09-27 NOTE — TELEPHONE ENCOUNTER
Called patient and left message, please notify her that the screening breast ultrasound is negative, she can resume her annual mammogram next year

## 2017-09-28 ENCOUNTER — TELEPHONE (OUTPATIENT)
Dept: MEDICAL GROUP | Facility: MEDICAL CENTER | Age: 72
End: 2017-09-28

## 2017-09-28 NOTE — TELEPHONE ENCOUNTER
----- Message from Asael Redd M.D. sent at 9/27/2017  4:21 PM PDT -----  Called patient and left message, please notify her that the screening breast ultrasound is negative, she can resume her annual mammogram next year

## 2017-10-02 ENCOUNTER — TELEPHONE (OUTPATIENT)
Dept: MEDICAL GROUP | Facility: MEDICAL CENTER | Age: 72
End: 2017-10-02

## 2017-10-04 ENCOUNTER — HOSPITAL ENCOUNTER (OUTPATIENT)
Dept: RADIOLOGY | Facility: MEDICAL CENTER | Age: 72
End: 2017-10-04
Attending: INTERNAL MEDICINE
Payer: MEDICARE

## 2017-10-04 ENCOUNTER — TELEPHONE (OUTPATIENT)
Dept: MEDICAL GROUP | Facility: MEDICAL CENTER | Age: 72
End: 2017-10-04

## 2017-10-04 DIAGNOSIS — R31.29 MICROSCOPIC HEMATURIA: ICD-10-CM

## 2017-10-04 PROCEDURE — 76775 US EXAM ABDO BACK WALL LIM: CPT

## 2017-10-05 NOTE — TELEPHONE ENCOUNTER
Notified patient, renal ultrasound negative, will repeat urinalysis in 2 weeks, order placed in computer  If continues to have persistent microscopic hematuria, next step would be CT urogram, and urology evaluation  Order placed in the computer system for urinalysis, she will get that done in 2 weeks  Patient agrees

## 2017-10-23 ENCOUNTER — APPOINTMENT (RX ONLY)
Dept: URBAN - METROPOLITAN AREA CLINIC 4 | Facility: CLINIC | Age: 72
Setting detail: DERMATOLOGY
End: 2017-10-23

## 2017-10-23 PROBLEM — C44.712 BASAL CELL CARCINOMA OF SKIN OF RIGHT LOWER LIMB, INCLUDING HIP: Status: ACTIVE | Noted: 2017-10-23

## 2017-10-23 PROCEDURE — ? EXCISION

## 2017-10-23 PROCEDURE — 11602 EXC TR-EXT MAL+MARG 1.1-2 CM: CPT

## 2017-10-23 PROCEDURE — ? ADDITIONAL NOTES

## 2017-10-23 PROCEDURE — 12032 INTMD RPR S/A/T/EXT 2.6-7.5: CPT

## 2017-10-23 NOTE — PROCEDURE: ADDITIONAL NOTES
Additional Notes: There is an adjacent similar lesion in close proximity that was excised with the original lesion to avoid a positive margin.

## 2017-10-23 NOTE — PROCEDURE: EXCISION
Dorsal Nasal Flap Text: The defect edges were debeveled with a #15 scalpel blade.  Given the location of the defect and the proximity to free margins a dorsal nasal flap was deemed most appropriate.  Using a sterile surgical marker, an appropriate dorsal nasal flap was drawn around the defect.    The area thus outlined was incised deep to adipose tissue with a #15 scalpel blade.  The skin margins were undermined to an appropriate distance in all directions utilizing iris scissors.
S Plasty Text: Given the location and shape of the defect, and the orientation of relaxed skin tension lines, an S-plasty was deemed most appropriate for repair.  Using a sterile surgical marker, the appropriate outline of the S-plasty was drawn, incorporating the defect and placing the expected incisions within the relaxed skin tension lines where possible.  The area thus outlined was incised deep to adipose tissue with a #15 scalpel blade.  The skin margins were undermined to an appropriate distance in all directions utilizing iris scissors. The skin flaps were advanced over the defect.  The opposing margins were then approximated with interrupted buried subcutaneous sutures.
Mastoid Interpolation Flap Text: A decision was made to reconstruct the defect utilizing an interpolation axial flap and a staged reconstruction.  A telfa template was made of the defect.  This telfa template was then used to outline the mastoid interpolation flap.  The donor area for the pedicle flap was then injected with anesthesia.  The flap was excised through the skin and subcutaneous tissue down to the layer of the underlying musculature.  The pedicle flap was carefully excised within this deep plane to maintain its blood supply.  The edges of the donor site were undermined.   The donor site was closed in a primary fashion.  The pedicle was then rotated into position and sutured.  Once the tube was sutured into place, adequate blood supply was confirmed with blanching and refill.  The pedicle was then wrapped with xeroform gauze and dressed appropriately with a telfa and gauze bandage to ensure continued blood supply and protect the attached pedicle.
Dermal Autograft Text: The defect edges were debeveled with a #15 scalpel blade.  Given the location of the defect, shape of the defect and the proximity to free margins a dermal autograft was deemed most appropriate.  Using a sterile surgical marker, the primary defect shape was transferred to the donor site. The area thus outlined was incised deep to adipose tissue with a #15 scalpel blade.  The harvested graft was then trimmed of adipose and epidermal tissue until only dermis was left.  The skin graft was then placed in the primary defect and oriented appropriately.
Complex Repair And Rhombic Flap Text: The defect edges were debeveled with a #15 scalpel blade.  The primary defect was closed partially with a complex linear closure.  Given the location of the remaining defect, shape of the defect and the proximity to free margins a rhombic flap was deemed most appropriate for complete closure of the defect.  Using a sterile surgical marker, an appropriate advancement flap was drawn incorporating the defect and placing the expected incisions within the relaxed skin tension lines where possible.    The area thus outlined was incised deep to adipose tissue with a #15 scalpel blade.  The skin margins were undermined to an appropriate distance in all directions utilizing iris scissors.
Melolabial Transposition Flap Text: The defect edges were debeveled with a #15 scalpel blade.  Given the location of the defect and the proximity to free margins a melolabial flap was deemed most appropriate.  Using a sterile surgical marker, an appropriate melolabial transposition flap was drawn incorporating the defect.    The area thus outlined was incised deep to adipose tissue with a #15 scalpel blade.  The skin margins were undermined to an appropriate distance in all directions utilizing iris scissors.
Partial Purse String (Simple) Text: Given the location of the defect and the characteristics of the surrounding skin a simple purse string closure was deemed most appropriate.  Undermining was performed circumferentially around the surgical defect.  A purse string suture was then placed and tightened. Wound tension of the circular defect prevented complete closure of the wound.
Complex Repair And O-L Flap Text: The defect edges were debeveled with a #15 scalpel blade.  The primary defect was closed partially with a complex linear closure.  Given the location of the remaining defect, shape of the defect and the proximity to free margins an O-L flap was deemed most appropriate for complete closure of the defect.  Using a sterile surgical marker, an appropriate flap was drawn incorporating the defect and placing the expected incisions within the relaxed skin tension lines where possible.    The area thus outlined was incised deep to adipose tissue with a #15 scalpel blade.  The skin margins were undermined to an appropriate distance in all directions utilizing iris scissors.
Complex Repair And Bilobe Flap Text: The defect edges were debeveled with a #15 scalpel blade.  The primary defect was closed partially with a complex linear closure.  Given the location of the remaining defect, shape of the defect and the proximity to free margins a bilobe flap was deemed most appropriate for complete closure of the defect.  Using a sterile surgical marker, an appropriate advancement flap was drawn incorporating the defect and placing the expected incisions within the relaxed skin tension lines where possible.    The area thus outlined was incised deep to adipose tissue with a #15 scalpel blade.  The skin margins were undermined to an appropriate distance in all directions utilizing iris scissors.
Show Referring Physician Variable: Yes
Estimated Blood Loss (Cc): minimal
Size Of Lesion In Cm: 1.5
Cheek Interpolation Flap Text: A decision was made to reconstruct the defect utilizing an interpolation axial flap and a staged reconstruction.  A telfa template was made of the defect.  This telfa template was then used to outline the Cheek Interpolation flap.  The donor area for the pedicle flap was then injected with anesthesia.  The flap was excised through the skin and subcutaneous tissue down to the layer of the underlying musculature.  The interpolation flap was carefully excised within this deep plane to maintain its blood supply.  The edges of the donor site were undermined.   The donor site was closed in a primary fashion.  The pedicle was then rotated into position and sutured.  Once the tube was sutured into place, adequate blood supply was confirmed with blanching and refill.  The pedicle was then wrapped with xeroform gauze and dressed appropriately with a telfa and gauze bandage to ensure continued blood supply and protect the attached pedicle.
Intermediate / Complex Repair - Final Wound Length In Cm: 4.5
Complex Repair And Split-Thickness Skin Graft Text: The defect edges were debeveled with a #15 scalpel blade.  The primary defect was closed partially with a complex linear closure.  Given the location of the defect, shape of the defect and the proximity to free margins a split thickness skin graft was deemed most appropriate to repair the remaining defect.  The graft was trimmed to fit the size of the remaining defect.  The graft was then placed in the primary defect, oriented appropriately, and sutured into place.
Ftsg Text: The defect edges were debeveled with a #15 scalpel blade.  Given the location of the defect, shape of the defect and the proximity to free margins a full thickness skin graft was deemed most appropriate.  Using a sterile surgical marker, the primary defect shape was transferred to the donor site. The area thus outlined was incised deep to adipose tissue with a #15 scalpel blade.  The harvested graft was then trimmed of adipose tissue until only dermis and epidermis was left.  The skin margins of the secondary defect were undermined to an appropriate distance in all directions utilizing iris scissors.  The secondary defect was closed with interrupted buried subcutaneous sutures.  The skin edges were then re-apposed with running  sutures.  The skin graft was then placed in the primary defect and oriented appropriately.
Render The Repair Note As A Separate Paragraph: No
Elliptical Excision Additional Text (Leave Blank If You Do Not Want): The margin was drawn around the clinically apparent lesion.  An elliptical shape was then drawn on the skin incorporating the lesion and margins.  Incisions were then made along these lines to the appropriate tissue plane and the lesion was extirpated.
Complex Repair And Epidermal Autograft Text: The defect edges were debeveled with a #15 scalpel blade.  The primary defect was closed partially with a complex linear closure.  Given the location of the defect, shape of the defect and the proximity to free margins an epidermal autograft was deemed most appropriate to repair the remaining defect.  The graft was trimmed to fit the size of the remaining defect.  The graft was then placed in the primary defect, oriented appropriately, and sutured into place.
Suture Removal: 14 days
Complex Repair And Ftsg Text: The defect edges were debeveled with a #15 scalpel blade.  The primary defect was closed partially with a complex linear closure.  Given the location of the defect, shape of the defect and the proximity to free margins a full thickness skin graft was deemed most appropriate to repair the remaining defect.  The graft was trimmed to fit the size of the remaining defect.  The graft was then placed in the primary defect, oriented appropriately, and sutured into place.
Muscle Hinge Flap Text: The defect edges were debeveled with a #15 scalpel blade.  Given the size, depth and location of the defect and the proximity to free margins a muscle hinge flap was deemed most appropriate.  Using a sterile surgical marker, an appropriate hinge flap was drawn incorporating the defect. The area thus outlined was incised with a #15 scalpel blade.  The skin margins were undermined to an appropriate distance in all directions utilizing iris scissors.
Crescentic Advancement Flap Text: The defect edges were debeveled with a #15 scalpel blade.  Given the location of the defect and the proximity to free margins a crescentic advancement flap was deemed most appropriate.  Using a sterile surgical marker, the appropriate advancement flap was drawn incorporating the defect and placing the expected incisions within the relaxed skin tension lines where possible.    The area thus outlined was incised deep to adipose tissue with a #15 scalpel blade.  The skin margins were undermined to an appropriate distance in all directions utilizing iris scissors.
Rhombic Flap Text: The defect edges were debeveled with a #15 scalpel blade.  Given the location of the defect and the proximity to free margins a rhombic flap was deemed most appropriate.  Using a sterile surgical marker, an appropriate rhombic flap was drawn incorporating the defect.    The area thus outlined was incised deep to adipose tissue with a #15 scalpel blade.  The skin margins were undermined to an appropriate distance in all directions utilizing iris scissors.
Complex Repair And Transposition Flap Text: The defect edges were debeveled with a #15 scalpel blade.  The primary defect was closed partially with a complex linear closure.  Given the location of the remaining defect, shape of the defect and the proximity to free margins a transposition flap was deemed most appropriate for complete closure of the defect.  Using a sterile surgical marker, an appropriate advancement flap was drawn incorporating the defect and placing the expected incisions within the relaxed skin tension lines where possible.    The area thus outlined was incised deep to adipose tissue with a #15 scalpel blade.  The skin margins were undermined to an appropriate distance in all directions utilizing iris scissors.
Complex Repair And Double Advancement Flap Text: The defect edges were debeveled with a #15 scalpel blade.  The primary defect was closed partially with a complex linear closure.  Given the location of the remaining defect, shape of the defect and the proximity to free margins a double advancement flap was deemed most appropriate for complete closure of the defect.  Using a sterile surgical marker, an appropriate advancement flap was drawn incorporating the defect and placing the expected incisions within the relaxed skin tension lines where possible.    The area thus outlined was incised deep to adipose tissue with a #15 scalpel blade.  The skin margins were undermined to an appropriate distance in all directions utilizing iris scissors.
V-Y Flap Text: The defect edges were debeveled with a #15 scalpel blade.  Given the location of the defect, shape of the defect and the proximity to free margins a V-Y flap was deemed most appropriate.  Using a sterile surgical marker, an appropriate advancement flap was drawn incorporating the defect and placing the expected incisions within the relaxed skin tension lines where possible.    The area thus outlined was incised deep to adipose tissue with a #15 scalpel blade.  The skin margins were undermined to an appropriate distance in all directions utilizing iris scissors.
Home Suture Removal Text: Patient was provided a home suture removal kit and will remove their sutures at home.  If they have any questions or difficulties they will call the office.
Detail Level: Detailed
Complex Repair And V-Y Plasty Text: The defect edges were debeveled with a #15 scalpel blade.  The primary defect was closed partially with a complex linear closure.  Given the location of the remaining defect, shape of the defect and the proximity to free margins a V-Y plasty was deemed most appropriate for complete closure of the defect.  Using a sterile surgical marker, an appropriate advancement flap was drawn incorporating the defect and placing the expected incisions within the relaxed skin tension lines where possible.    The area thus outlined was incised deep to adipose tissue with a #15 scalpel blade.  The skin margins were undermined to an appropriate distance in all directions utilizing iris scissors.
Additional Anesthesia Volume In Cc: 0
Anesthesia Volume In Cc: 6
Mucosal Advancement Flap Text: Given the location of the defect, shape of the defect and the proximity to free margins a mucosal advancement flap was deemed most appropriate. Incisions were made with a 15 blade scalpel in the appropriate fashion along the cutaneous vermilion border and the mucosal lip. The remaining actinically damaged mucosal tissue was excised.  The mucosal advancement flap was then elevated to the gingival sulcus with care taken to preserve the neurovascular structures and advanced into the primary defect. Care was taken to ensure that precise realignment of the vermilion border was achieved.
Epidermal Closure: running cuticular
Complex Repair And Modified Advancement Flap Text: The defect edges were debeveled with a #15 scalpel blade.  The primary defect was closed partially with a complex linear closure.  Given the location of the remaining defect, shape of the defect and the proximity to free margins a modified advancement flap was deemed most appropriate for complete closure of the defect.  Using a sterile surgical marker, an appropriate advancement flap was drawn incorporating the defect and placing the expected incisions within the relaxed skin tension lines where possible.    The area thus outlined was incised deep to adipose tissue with a #15 scalpel blade.  The skin margins were undermined to an appropriate distance in all directions utilizing iris scissors.
Complex Repair And Dorsal Nasal Flap Text: The defect edges were debeveled with a #15 scalpel blade.  The primary defect was closed partially with a complex linear closure.  Given the location of the remaining defect, shape of the defect and the proximity to free margins a dorsal nasal flap was deemed most appropriate for complete closure of the defect.  Using a sterile surgical marker, an appropriate flap was drawn incorporating the defect and placing the expected incisions within the relaxed skin tension lines where possible.    The area thus outlined was incised deep to adipose tissue with a #15 scalpel blade.  The skin margins were undermined to an appropriate distance in all directions utilizing iris scissors.
Bilobed Transposition Flap Text: The defect edges were debeveled with a #15 scalpel blade.  Given the location of the defect and the proximity to free margins a bilobed transposition flap was deemed most appropriate.  Using a sterile surgical marker, an appropriate bilobe flap drawn around the defect.    The area thus outlined was incised deep to adipose tissue with a #15 scalpel blade.  The skin margins were undermined to an appropriate distance in all directions utilizing iris scissors.
Trilobed Flap Text: The defect edges were debeveled with a #15 scalpel blade.  Given the location of the defect and the proximity to free margins a trilobed flap was deemed most appropriate.  Using a sterile surgical marker, an appropriate trilobed flap drawn around the defect.    The area thus outlined was incised deep to adipose tissue with a #15 scalpel blade.  The skin margins were undermined to an appropriate distance in all directions utilizing iris scissors.
Complex Repair And Melolabial Flap Text: The defect edges were debeveled with a #15 scalpel blade.  The primary defect was closed partially with a complex linear closure.  Given the location of the remaining defect, shape of the defect and the proximity to free margins a melolabial flap was deemed most appropriate for complete closure of the defect.  Using a sterile surgical marker, an appropriate advancement flap was drawn incorporating the defect and placing the expected incisions within the relaxed skin tension lines where possible.    The area thus outlined was incised deep to adipose tissue with a #15 scalpel blade.  The skin margins were undermined to an appropriate distance in all directions utilizing iris scissors.
Fusiform Excision Additional Text (Leave Blank If You Do Not Want): The margin was drawn around the clinically apparent lesion.  A fusiform shape was then drawn on the skin incorporating the lesion and margins.  Incisions were then made along these lines to the appropriate tissue plane and the lesion was extirpated.
Lab Facility: 
Epidermal Sutures: 4-0 Ethilon
Slit Excision Additional Text (Leave Blank If You Do Not Want): A linear line was drawn on the skin overlying the lesion. An incision was made slowly until the lesion was visualized.  Once visualized, the lesion was removed with blunt dissection.
Melolabial Interpolation Flap Text: A decision was made to reconstruct the defect utilizing an interpolation axial flap and a staged reconstruction.  A telfa template was made of the defect.  This telfa template was then used to outline the melolabial interpolation flap.  The donor area for the pedicle flap was then injected with anesthesia.  The flap was excised through the skin and subcutaneous tissue down to the layer of the underlying musculature.  The pedicle flap was carefully excised within this deep plane to maintain its blood supply.  The edges of the donor site were undermined.   The donor site was closed in a primary fashion.  The pedicle was then rotated into position and sutured.  Once the tube was sutured into place, adequate blood supply was confirmed with blanching and refill.  The pedicle was then wrapped with xeroform gauze and dressed appropriately with a telfa and gauze bandage to ensure continued blood supply and protect the attached pedicle.
Modified Advancement Flap Text: The defect edges were debeveled with a #15 scalpel blade.  Given the location of the defect, shape of the defect and the proximity to free margins a modified advancement flap was deemed most appropriate.  Using a sterile surgical marker, an appropriate advancement flap was drawn incorporating the defect and placing the expected incisions within the relaxed skin tension lines where possible.    The area thus outlined was incised deep to adipose tissue with a #15 scalpel blade.  The skin margins were undermined to an appropriate distance in all directions utilizing iris scissors.
Cartilage Graft Text: The defect edges were debeveled with a #15 scalpel blade.  Given the location of the defect, shape of the defect, the fact the defect involved a full thickness cartilage defect a cartilage graft was deemed most appropriate.  An appropriate donor site was identified, cleansed, and anesthetized. The cartilage graft was then harvested and transferred to the recipient site, oriented appropriately and then sutured into place.  The secondary defect was then repaired using a primary closure.
Post-Care Instructions: I reviewed with the patient in detail post-care instructions. Patient is not to engage in any heavy lifting and moderate exercise for the next 14 days. Should the patient develop any fevers, chills, bleeding, severe pain patient will contact the office immediately.
Repair Performed By Another Provider Text (Leave Blank If You Do Not Want): After the tissue was excised the defect was repaired by another provider.
Advancement Flap (Single) Text: The defect edges were debeveled with a #15 scalpel blade.  Given the location of the defect and the proximity to free margins a single advancement flap was deemed most appropriate.  Using a sterile surgical marker, an appropriate advancement flap was drawn incorporating the defect and placing the expected incisions within the relaxed skin tension lines where possible.    The area thus outlined was incised deep to adipose tissue with a #15 scalpel blade.  The skin margins were undermined to an appropriate distance in all directions utilizing iris scissors.
Partial Purse String (Intermediate) Text: Given the location of the defect and the characteristics of the surrounding skin an intermediate purse string closure was deemed most appropriate.  Undermining was performed circumferentially around the surgical defect.  A purse string suture was then placed and tightened. Wound tension of the circular defect prevented complete closure of the wound.
Hemostasis: Electrocautery
Cheek-To-Nose Interpolation Flap Text: A decision was made to reconstruct the defect utilizing an interpolation axial flap and a staged reconstruction.  A telfa template was made of the defect.  This telfa template was then used to outline the Cheek-To-Nose Interpolation flap.  The donor area for the pedicle flap was then injected with anesthesia.  The flap was excised through the skin and subcutaneous tissue down to the layer of the underlying musculature.  The interpolation flap was carefully excised within this deep plane to maintain its blood supply.  The edges of the donor site were undermined.   The donor site was closed in a primary fashion.  The pedicle was then rotated into position and sutured.  Once the tube was sutured into place, adequate blood supply was confirmed with blanching and refill.  The pedicle was then wrapped with xeroform gauze and dressed appropriately with a telfa and gauze bandage to ensure continued blood supply and protect the attached pedicle.
Spiral Flap Text: The defect edges were debeveled with a #15 scalpel blade.  Given the location of the defect, shape of the defect and the proximity to free margins a spiral flap was deemed most appropriate.  Using a sterile surgical marker, an appropriate rotation flap was drawn incorporating the defect and placing the expected incisions within the relaxed skin tension lines where possible. The area thus outlined was incised deep to adipose tissue with a #15 scalpel blade.  The skin margins were undermined to an appropriate distance in all directions utilizing iris scissors.
Skin Substitute Text: The defect edges were debeveled with a #15 scalpel blade.  Given the location of the defect, shape of the defect and the proximity to free margins a skin substitute graft was deemed most appropriate.  The graft material was trimmed to fit the size of the defect. The graft was then placed in the primary defect and oriented appropriately.
Dressing: pressure dressing
Curvilinear Excision Additional Text (Leave Blank If You Do Not Want): The margin was drawn around the clinically apparent lesion.  A curvilinear shape was then drawn on the skin incorporating the lesion and margins.  Incisions were then made along these lines to the appropriate tissue plane and the lesion was extirpated.
Lab: [id number]
Complex Repair And Dermal Autograft Text: The defect edges were debeveled with a #15 scalpel blade.  The primary defect was closed partially with a complex linear closure.  Given the location of the defect, shape of the defect and the proximity to free margins an dermal autograft was deemed most appropriate to repair the remaining defect.  The graft was trimmed to fit the size of the remaining defect.  The graft was then placed in the primary defect, oriented appropriately, and sutured into place.
Accession #: [id number]
V-Y Plasty Text: The defect edges were debeveled with a #15 scalpel blade.  Given the location of the defect, shape of the defect and the proximity to free margins an V-Y advancement flap was deemed most appropriate.  Using a sterile surgical marker, an appropriate advancement flap was drawn incorporating the defect and placing the expected incisions within the relaxed skin tension lines where possible.    The area thus outlined was incised deep to adipose tissue with a #15 scalpel blade.  The skin margins were undermined to an appropriate distance in all directions utilizing iris scissors.
Deep Sutures: 4-0 Vicryl
Surgeon (Optional): Dr. Ayanna Falcon MD
No Repair - Repaired With Adjacent Surgical Defect Text (Leave Blank If You Do Not Want): After the excision the defect was repaired concurrently with another surgical defect which was in close approximation.
Advancement-Rotation Flap Text: The defect edges were debeveled with a #15 scalpel blade.  Given the location of the defect, shape of the defect and the proximity to free margins an advancement-rotation flap was deemed most appropriate.  Using a sterile surgical marker, an appropriate flap was drawn incorporating the defect and placing the expected incisions within the relaxed skin tension lines where possible. The area thus outlined was incised deep to adipose tissue with a #15 scalpel blade.  The skin margins were undermined to an appropriate distance in all directions utilizing iris scissors.
Split-Thickness Skin Graft Text: The defect edges were debeveled with a #15 scalpel blade.  Given the location of the defect, shape of the defect and the proximity to free margins a split thickness skin graft was deemed most appropriate.  Using a sterile surgical marker, the primary defect shape was transferred to the donor site. The split thickness graft was then harvested.  The skin graft was then placed in the primary defect and oriented appropriately.
Complex Repair And M Plasty Text: The defect edges were debeveled with a #15 scalpel blade.  The primary defect was closed partially with a complex linear closure.  Given the location of the remaining defect, shape of the defect and the proximity to free margins an M plasty was deemed most appropriate for complete closure of the defect.  Using a sterile surgical marker, an appropriate advancement flap was drawn incorporating the defect and placing the expected incisions within the relaxed skin tension lines where possible.    The area thus outlined was incised deep to adipose tissue with a #15 scalpel blade.  The skin margins were undermined to an appropriate distance in all directions utilizing iris scissors.
Scalpel Size: 15 blade
Purse String (Simple) Text: Given the location of the defect and the characteristics of the surrounding skin a purse string simple closure was deemed most appropriate.  Undermining was performed circumferentially around the surgical defect.  A purse string suture was then placed and tightened.
O-L Flap Text: The defect edges were debeveled with a #15 scalpel blade.  Given the location of the defect, shape of the defect and the proximity to free margins an O-L flap was deemed most appropriate.  Using a sterile surgical marker, an appropriate advancement flap was drawn incorporating the defect and placing the expected incisions within the relaxed skin tension lines where possible.    The area thus outlined was incised deep to adipose tissue with a #15 scalpel blade.  The skin margins were undermined to an appropriate distance in all directions utilizing iris scissors.
Tissue Cultured Epidermal Autograft Text: The defect edges were debeveled with a #15 scalpel blade.  Given the location of the defect, shape of the defect and the proximity to free margins a tissue cultured epidermal autograft was deemed most appropriate.  The graft was then trimmed to fit the size of the defect.  The graft was then placed in the primary defect and oriented appropriately.
Complex Repair And Single Advancement Flap Text: The defect edges were debeveled with a #15 scalpel blade.  The primary defect was closed partially with a complex linear closure.  Given the location of the remaining defect, shape of the defect and the proximity to free margins a single advancement flap was deemed most appropriate for complete closure of the defect.  Using a sterile surgical marker, an appropriate advancement flap was drawn incorporating the defect and placing the expected incisions within the relaxed skin tension lines where possible.    The area thus outlined was incised deep to adipose tissue with a #15 scalpel blade.  The skin margins were undermined to an appropriate distance in all directions utilizing iris scissors.
Island Pedicle Flap-Requiring Vessel Identification Text: The defect edges were debeveled with a #15 scalpel blade.  Given the location of the defect, shape of the defect and the proximity to free margins an island pedicle advancement flap was deemed most appropriate.  Using a sterile surgical marker, an appropriate advancement flap was drawn, based on the axial vessel mentioned above, incorporating the defect, outlining the appropriate donor tissue and placing the expected incisions within the relaxed skin tension lines where possible.    The area thus outlined was incised deep to adipose tissue with a #15 scalpel blade.  The skin margins were undermined to an appropriate distance in all directions around the primary defect and laterally outward around the island pedicle utilizing iris scissors.  There was minimal undermining beneath the pedicle flap.
Purse String (Intermediate) Text: Given the location of the defect and the characteristics of the surrounding skin a purse string intermediate closure was deemed most appropriate.  Undermining was performed circumfirentially around the surgical defect.  A purse string suture was then placed and tightened.
Path Notes (To The Dermatopathologist): Please check margins.
Size Of Margin In Cm: 0.2
Excision Depth: adipose tissue
Excisional Biopsy Additional Text (Leave Blank If You Do Not Want): The margin was drawn around the clinically apparent lesion. An elliptical shape was then drawn on the skin incorporating the lesion and margins.  Incisions were then made along these lines to the appropriate tissue plane and the lesion was extirpated.
Interpolation Flap Text: A decision was made to reconstruct the defect utilizing an interpolation axial flap and a staged reconstruction.  A telfa template was made of the defect.  This telfa template was then used to outline the interpolation flap.  The donor area for the pedicle flap was then injected with anesthesia.  The flap was excised through the skin and subcutaneous tissue down to the layer of the underlying musculature.  The interpolation flap was carefully excised within this deep plane to maintain its blood supply.  The edges of the donor site were undermined.   The donor site was closed in a primary fashion.  The pedicle was then rotated into position and sutured.  Once the tube was sutured into place, adequate blood supply was confirmed with blanching and refill.  The pedicle was then wrapped with xeroform gauze and dressed appropriately with a telfa and gauze bandage to ensure continued blood supply and protect the attached pedicle.
Complex Repair And Xenograft Text: The defect edges were debeveled with a #15 scalpel blade.  The primary defect was closed partially with a complex linear closure.  Given the location of the defect, shape of the defect and the proximity to free margins a xenograft was deemed most appropriate to repair the remaining defect.  The graft was trimmed to fit the size of the remaining defect.  The graft was then placed in the primary defect, oriented appropriately, and sutured into place.
Complex Repair And Z Plasty Text: The defect edges were debeveled with a #15 scalpel blade.  The primary defect was closed partially with a complex linear closure.  Given the location of the remaining defect, shape of the defect and the proximity to free margins a Z plasty was deemed most appropriate for complete closure of the defect.  Using a sterile surgical marker, an appropriate advancement flap was drawn incorporating the defect and placing the expected incisions within the relaxed skin tension lines where possible.    The area thus outlined was incised deep to adipose tissue with a #15 scalpel blade.  The skin margins were undermined to an appropriate distance in all directions utilizing iris scissors.
Z Plasty Text: The lesion was extirpated to the level of the fat with a #15 scalpel blade.  Given the location of the defect, shape of the defect and the proximity to free margins a Z-plasty was deemed most appropriate for repair.  Using a sterile surgical marker, the appropriate transposition arms of the Z-plasty were drawn incorporating the defect and placing the expected incisions within the relaxed skin tension lines where possible.    The area thus outlined was incised deep to adipose tissue with a #15 scalpel blade.  The skin margins were undermined to an appropriate distance in all directions utilizing iris scissors.  The opposing transposition arms were then transposed into place in opposite direction and anchored with interrupted buried subcutaneous sutures.
Pre-Excision Curettage Text (Leave Blank If You Do Not Want): Prior to drawing the surgical margin the visible lesion was removed with electrodesiccation and curettage to clearly define the lesion size.
Complex Repair And Double M Plasty Text: The defect edges were debeveled with a #15 scalpel blade.  The primary defect was closed partially with a complex linear closure.  Given the location of the remaining defect, shape of the defect and the proximity to free margins a double M plasty was deemed most appropriate for complete closure of the defect.  Using a sterile surgical marker, an appropriate advancement flap was drawn incorporating the defect and placing the expected incisions within the relaxed skin tension lines where possible.    The area thus outlined was incised deep to adipose tissue with a #15 scalpel blade.  The skin margins were undermined to an appropriate distance in all directions utilizing iris scissors.
Composite Graft Text: The defect edges were debeveled with a #15 scalpel blade.  Given the location of the defect, shape of the defect, the proximity to free margins and the fact the defect was full thickness a composite graft was deemed most appropriate.  The defect was outline and then transferred to the donor site.  A full thickness graft was then excised from the donor site. The graft was then placed in the primary defect, oriented appropriately and then sutured into place.  The secondary defect was then repaired using a primary closure.
Island Pedicle Flap Text: The defect edges were debeveled with a #15 scalpel blade.  Given the location of the defect, shape of the defect and the proximity to free margins an island pedicle advancement flap was deemed most appropriate.  Using a sterile surgical marker, an appropriate advancement flap was drawn incorporating the defect, outlining the appropriate donor tissue and placing the expected incisions within the relaxed skin tension lines where possible.    The area thus outlined was incised deep to adipose tissue with a #15 scalpel blade.  The skin margins were undermined to an appropriate distance in all directions around the primary defect and laterally outward around the island pedicle utilizing iris scissors.  There was minimal undermining beneath the pedicle flap.
Positioning (Leave Blank If You Do Not Want): The patient was placed in a comfortable position exposing the surgical site.
Advancement Flap (Double) Text: The defect edges were debeveled with a #15 scalpel blade.  Given the location of the defect and the proximity to free margins a double advancement flap was deemed most appropriate.  Using a sterile surgical marker, the appropriate advancement flaps were drawn incorporating the defect and placing the expected incisions within the relaxed skin tension lines where possible.    The area thus outlined was incised deep to adipose tissue with a #15 scalpel blade.  The skin margins were undermined to an appropriate distance in all directions utilizing iris scissors.
Star Wedge Flap Text: The defect edges were debeveled with a #15 scalpel blade.  Given the location of the defect, shape of the defect and the proximity to free margins a star wedge flap was deemed most appropriate.  Using a sterile surgical marker, an appropriate rotation flap was drawn incorporating the defect and placing the expected incisions within the relaxed skin tension lines where possible. The area thus outlined was incised deep to adipose tissue with a #15 scalpel blade.  The skin margins were undermined to an appropriate distance in all directions utilizing iris scissors.
Keystone Flap Text: The defect edges were debeveled with a #15 scalpel blade.  Given the location of the defect, shape of the defect a keystone flap was deemed most appropriate.  Using a sterile surgical marker, an appropriate keystone flap was drawn incorporating the defect, outlining the appropriate donor tissue and placing the expected incisions within the relaxed skin tension lines where possible. The area thus outlined was incised deep to adipose tissue with a #15 scalpel blade.  The skin margins were undermined to an appropriate distance in all directions around the primary defect and laterally outward around the flap utilizing iris scissors.
O-T Advancement Flap Text: The defect edges were debeveled with a #15 scalpel blade.  Given the location of the defect, shape of the defect and the proximity to free margins an O-T advancement flap was deemed most appropriate.  Using a sterile surgical marker, an appropriate advancement flap was drawn incorporating the defect and placing the expected incisions within the relaxed skin tension lines where possible.    The area thus outlined was incised deep to adipose tissue with a #15 scalpel blade.  The skin margins were undermined to an appropriate distance in all directions utilizing iris scissors.
Complex Repair Preamble Text (Leave Blank If You Do Not Want): Extensive wide undermining was performed.
Perilesional Excision Additional Text (Leave Blank If You Do Not Want): The margin was drawn around the clinically apparent lesion. Incisions were then made along these lines to the appropriate tissue plane and the lesion was extirpated.
Excision Method: Fusiform
Epidermal Closure Graft Donor Site (Optional): simple interrupted
Paramedian Forehead Flap Text: A decision was made to reconstruct the defect utilizing an interpolation axial flap and a staged reconstruction.  A telfa template was made of the defect.  This telfa template was then used to outline the paramedian forehead pedicle flap.  The donor area for the pedicle flap was then injected with anesthesia.  The flap was excised through the skin and subcutaneous tissue down to the layer of the underlying musculature.  The pedicle flap was carefully excised within this deep plane to maintain its blood supply.  The edges of the donor site were undermined.   The donor site was closed in a primary fashion.  The pedicle was then rotated into position and sutured.  Once the tube was sutured into place, adequate blood supply was confirmed with blanching and refill.  The pedicle was then wrapped with xeroform gauze and dressed appropriately with a telfa and gauze bandage to ensure continued blood supply and protect the attached pedicle.
Bi-Rhombic Flap Text: The defect edges were debeveled with a #15 scalpel blade.  Given the location of the defect and the proximity to free margins a bi-rhombic flap was deemed most appropriate.  Using a sterile surgical marker, an appropriate rhombic flap was drawn incorporating the defect. The area thus outlined was incised deep to adipose tissue with a #15 scalpel blade.  The skin margins were undermined to an appropriate distance in all directions utilizing iris scissors.
Complex Repair And W Plasty Text: The defect edges were debeveled with a #15 scalpel blade.  The primary defect was closed partially with a complex linear closure.  Given the location of the remaining defect, shape of the defect and the proximity to free margins a W plasty was deemed most appropriate for complete closure of the defect.  Using a sterile surgical marker, an appropriate advancement flap was drawn incorporating the defect and placing the expected incisions within the relaxed skin tension lines where possible.    The area thus outlined was incised deep to adipose tissue with a #15 scalpel blade.  The skin margins were undermined to an appropriate distance in all directions utilizing iris scissors.
Anesthesia Type: 1% lidocaine with 1:100,000 epinephrine and 408mcg clindamycin/ml and a 1:10 solution of 8.4% sodium bicarbonate
Complex Repair And Rotation Flap Text: The defect edges were debeveled with a #15 scalpel blade.  The primary defect was closed partially with a complex linear closure.  Given the location of the remaining defect, shape of the defect and the proximity to free margins a rotation flap was deemed most appropriate for complete closure of the defect.  Using a sterile surgical marker, an appropriate advancement flap was drawn incorporating the defect and placing the expected incisions within the relaxed skin tension lines where possible.    The area thus outlined was incised deep to adipose tissue with a #15 scalpel blade.  The skin margins were undermined to an appropriate distance in all directions utilizing iris scissors.
Bilobed Flap Text: The defect edges were debeveled with a #15 scalpel blade.  Given the location of the defect and the proximity to free margins a bilobe flap was deemed most appropriate.  Using a sterile surgical marker, an appropriate bilobe flap drawn around the defect.    The area thus outlined was incised deep to adipose tissue with a #15 scalpel blade.  The skin margins were undermined to an appropriate distance in all directions utilizing iris scissors.
Wound Care: Petrolatum
Hatchet Flap Text: The defect edges were debeveled with a #15 scalpel blade.  Given the location of the defect, shape of the defect and the proximity to free margins a hatchet flap was deemed most appropriate.  Using a sterile surgical marker, an appropriate hatchet flap was drawn incorporating the defect and placing the expected incisions within the relaxed skin tension lines where possible.    The area thus outlined was incised deep to adipose tissue with a #15 scalpel blade.  The skin margins were undermined to an appropriate distance in all directions utilizing iris scissors.
Burow's Advancement Flap Text: The defect edges were debeveled with a #15 scalpel blade.  Given the location of the defect and the proximity to free margins a Burow's advancement flap was deemed most appropriate.  Using a sterile surgical marker, the appropriate advancement flap was drawn incorporating the defect and placing the expected incisions within the relaxed skin tension lines where possible.    The area thus outlined was incised deep to adipose tissue with a #15 scalpel blade.  The skin margins were undermined to an appropriate distance in all directions utilizing iris scissors.
Ear Star Wedge Flap Text: The defect edges were debeveled with a #15 blade scalpel.  Given the location of the defect and the proximity to free margins (helical rim) an ear star wedge flap was deemed most appropriate.  Using a sterile surgical marker, the appropriate flap was drawn incorporating the defect and placing the expected incisions between the helical rim and antihelix where possible.  The area thus outlined was incised through and through with a #15 scalpel blade.
Epidermal Autograft Text: The defect edges were debeveled with a #15 scalpel blade.  Given the location of the defect, shape of the defect and the proximity to free margins an epidermal autograft was deemed most appropriate.  Using a sterile surgical marker, the primary defect shape was transferred to the donor site. The epidermal graft was then harvested.  The skin graft was then placed in the primary defect and oriented appropriately.
H Plasty Text: Given the location of the defect, shape of the defect and the proximity to free margins a H-plasty was deemed most appropriate for repair.  Using a sterile surgical marker, the appropriate advancement arms of the H-plasty were drawn incorporating the defect and placing the expected incisions within the relaxed skin tension lines where possible. The area thus outlined was incised deep to adipose tissue with a #15 scalpel blade. The skin margins were undermined to an appropriate distance in all directions utilizing iris scissors.  The opposing advancement arms were then advanced into place in opposite direction and anchored with interrupted buried subcutaneous sutures.
Consent was obtained from the patient. The risks and benefits to therapy were discussed in detail. Specifically, the risks of infection, scarring, bleeding, prolonged wound healing, incomplete removal, allergy to anesthesia, nerve injury and recurrence were addressed. Prior to the procedure, the treatment site was clearly identified and confirmed by the patient. All components of Universal Protocol/PAUSE Rule completed.
Complex Repair And Skin Substitute Graft Text: The defect edges were debeveled with a #15 scalpel blade.  The primary defect was closed partially with a complex linear closure.  Given the location of the remaining defect, shape of the defect and the proximity to free margins a skin substitute graft was deemed most appropriate to repair the remaining defect.  The graft was trimmed to fit the size of the remaining defect.  The graft was then placed in the primary defect, oriented appropriately, and sutured into place.
Island Pedicle Flap With Canthal Suspension Text: The defect edges were debeveled with a #15 scalpel blade.  Given the location of the defect, shape of the defect and the proximity to free margins an island pedicle advancement flap was deemed most appropriate.  Using a sterile surgical marker, an appropriate advancement flap was drawn incorporating the defect, outlining the appropriate donor tissue and placing the expected incisions within the relaxed skin tension lines where possible. The area thus outlined was incised deep to adipose tissue with a #15 scalpel blade.  The skin margins were undermined to an appropriate distance in all directions around the primary defect and laterally outward around the island pedicle utilizing iris scissors.  There was minimal undermining beneath the pedicle flap. A suspension suture was placed in the canthal tendon to prevent tension and prevent ectropion.
Complex Repair And Tissue Cultured Epidermal Autograft Text: The defect edges were debeveled with a #15 scalpel blade.  The primary defect was closed partially with a complex linear closure.  Given the location of the defect, shape of the defect and the proximity to free margins an tissue cultured epidermal autograft was deemed most appropriate to repair the remaining defect.  The graft was trimmed to fit the size of the remaining defect.  The graft was then placed in the primary defect, oriented appropriately, and sutured into place.
Transposition Flap Text: The defect edges were debeveled with a #15 scalpel blade.  Given the location of the defect and the proximity to free margins a transposition flap was deemed most appropriate.  Using a sterile surgical marker, an appropriate transposition flap was drawn incorporating the defect.    The area thus outlined was incised deep to adipose tissue with a #15 scalpel blade.  The skin margins were undermined to an appropriate distance in all directions utilizing iris scissors.
Intermediate Repair Preamble Text (Leave Blank If You Do Not Want): Undermining was performed with blunt dissection.
Graft Donor Site Bandage (Optional-Leave Blank If You Don't Want In Note): Steri-strips and a pressure bandage were applied to the donor site.
Billing Type: Third-Party Bill
Helical Rim Advancement Flap Text: The defect edges were debeveled with a #15 blade scalpel.  Given the location of the defect and the proximity to free margins (helical rim) a double helical rim advancement flap was deemed most appropriate.  Using a sterile surgical marker, the appropriate advancement flaps were drawn incorporating the defect and placing the expected incisions between the helical rim and antihelix where possible.  The area thus outlined was incised through and through with a #15 scalpel blade.  With a skin hook and iris scissors, the flaps were gently and sharply undermined and freed up.
Xenograft Text: The defect edges were debeveled with a #15 scalpel blade.  Given the location of the defect, shape of the defect and the proximity to free margins a xenograft was deemed most appropriate.  The graft was then trimmed to fit the size of the defect.  The graft was then placed in the primary defect and oriented appropriately.
Complex Repair And A-T Advancement Flap Text: The defect edges were debeveled with a #15 scalpel blade.  The primary defect was closed partially with a complex linear closure.  Given the location of the remaining defect, shape of the defect and the proximity to free margins an A-T advancement flap was deemed most appropriate for complete closure of the defect.  Using a sterile surgical marker, an appropriate advancement flap was drawn incorporating the defect and placing the expected incisions within the relaxed skin tension lines where possible.    The area thus outlined was incised deep to adipose tissue with a #15 scalpel blade.  The skin margins were undermined to an appropriate distance in all directions utilizing iris scissors.
O-Z Plasty Text: The defect edges were debeveled with a #15 scalpel blade.  Given the location of the defect, shape of the defect and the proximity to free margins an O-Z plasty (double transposition flap) was deemed most appropriate.  Using a sterile surgical marker, the appropriate transposition flaps were drawn incorporating the defect and placing the expected incisions within the relaxed skin tension lines where possible.    The area thus outlined was incised deep to adipose tissue with a #15 scalpel blade.  The skin margins were undermined to an appropriate distance in all directions utilizing iris scissors.  Hemostasis was achieved with electrocautery.  The flaps were then transposed into place, one clockwise and the other counterclockwise, and anchored with interrupted buried subcutaneous sutures.
O-T Plasty Text: The defect edges were debeveled with a #15 scalpel blade.  Given the location of the defect, shape of the defect and the proximity to free margins an O-T plasty was deemed most appropriate.  Using a sterile surgical marker, an appropriate O-T plasty was drawn incorporating the defect and placing the expected incisions within the relaxed skin tension lines where possible.    The area thus outlined was incised deep to adipose tissue with a #15 scalpel blade.  The skin margins were undermined to an appropriate distance in all directions utilizing iris scissors.
A-T Advancement Flap Text: The defect edges were debeveled with a #15 scalpel blade.  Given the location of the defect, shape of the defect and the proximity to free margins an A-T advancement flap was deemed most appropriate.  Using a sterile surgical marker, an appropriate advancement flap was drawn incorporating the defect and placing the expected incisions within the relaxed skin tension lines where possible.    The area thus outlined was incised deep to adipose tissue with a #15 scalpel blade.  The skin margins were undermined to an appropriate distance in all directions utilizing iris scissors.
Complex Repair And O-T Advancement Flap Text: The defect edges were debeveled with a #15 scalpel blade.  The primary defect was closed partially with a complex linear closure.  Given the location of the remaining defect, shape of the defect and the proximity to free margins an O-T advancement flap was deemed most appropriate for complete closure of the defect.  Using a sterile surgical marker, an appropriate advancement flap was drawn incorporating the defect and placing the expected incisions within the relaxed skin tension lines where possible.    The area thus outlined was incised deep to adipose tissue with a #15 scalpel blade.  The skin margins were undermined to an appropriate distance in all directions utilizing iris scissors.
Alar Island Pedicle Flap Text: The defect edges were debeveled with a #15 scalpel blade.  Given the location of the defect, shape of the defect and the proximity to the alar rim an island pedicle advancement flap was deemed most appropriate.  Using a sterile surgical marker, an appropriate advancement flap was drawn incorporating the defect, outlining the appropriate donor tissue and placing the expected incisions within the nasal ala running parallel to the alar rim. The area thus outlined was incised with a #15 scalpel blade.  The skin margins were undermined minimally to an appropriate distance in all directions around the primary defect and laterally outward around the island pedicle utilizing iris scissors.  There was minimal undermining beneath the pedicle flap.
Rotation Flap Text: The defect edges were debeveled with a #15 scalpel blade.  Given the location of the defect, shape of the defect and the proximity to free margins a rotation flap was deemed most appropriate.  Using a sterile surgical marker, an appropriate rotation flap was drawn incorporating the defect and placing the expected incisions within the relaxed skin tension lines where possible.    The area thus outlined was incised deep to adipose tissue with a #15 scalpel blade.  The skin margins were undermined to an appropriate distance in all directions utilizing iris scissors.
Double Island Pedicle Flap Text: The defect edges were debeveled with a #15 scalpel blade.  Given the location of the defect, shape of the defect and the proximity to free margins a double island pedicle advancement flap was deemed most appropriate.  Using a sterile surgical marker, an appropriate advancement flap was drawn incorporating the defect, outlining the appropriate donor tissue and placing the expected incisions within the relaxed skin tension lines where possible.    The area thus outlined was incised deep to adipose tissue with a #15 scalpel blade.  The skin margins were undermined to an appropriate distance in all directions around the primary defect and laterally outward around the island pedicle utilizing iris scissors.  There was minimal undermining beneath the pedicle flap.
W Plasty Text: The lesion was extirpated to the level of the fat with a #15 scalpel blade.  Given the location of the defect, shape of the defect and the proximity to free margins a W-plasty was deemed most appropriate for repair.  Using a sterile surgical marker, the appropriate transposition arms of the W-plasty were drawn incorporating the defect and placing the expected incisions within the relaxed skin tension lines where possible.    The area thus outlined was incised deep to adipose tissue with a #15 scalpel blade.  The skin margins were undermined to an appropriate distance in all directions utilizing iris scissors.  The opposing transposition arms were then transposed into place in opposite direction and anchored with interrupted buried subcutaneous sutures.
Bilateral Helical Rim Advancement Flap Text: The defect edges were debeveled with a #15 blade scalpel.  Given the location of the defect and the proximity to free margins (helical rim) a bilateral helical rim advancement flap was deemed most appropriate.  Using a sterile surgical marker, the appropriate advancement flaps were drawn incorporating the defect and placing the expected incisions between the helical rim and antihelix where possible.  The area thus outlined was incised through and through with a #15 scalpel blade.  With a skin hook and iris scissors, the flaps were gently and sharply undermined and freed up.
Repair Type: Intermediate
Saucerization Excision Additional Text (Leave Blank If You Do Not Want): The margin was drawn around the clinically apparent lesion.  Incisions were then made along these lines, in a tangential fashion, to the appropriate tissue plane and the lesion was extirpated.
Lip Wedge Excision Repair Text: Given the location of the defect and the proximity to free margins a full thickness wedge repair was deemed most appropriate.  Using a sterile surgical marker, the appropriate repair was drawn incorporating the defect and placing the expected incisions perpendicular to the vermilion border.  The vermilion border was also meticulously outlined to ensure appropriate reapproximation during the repair.  The area thus outlined was incised through and through with a #15 scalpel blade.  The muscularis and dermis were reaproximated with deep sutures following hemostasis. Care was taken to realign the vermilion border before proceeding with the superficial closure.  Once the vermilion was realigned the superfical and mucosal closure was finished.
Posterior Auricular Interpolation Flap Text: A decision was made to reconstruct the defect utilizing an interpolation axial flap and a staged reconstruction.  A telfa template was made of the defect.  This telfa template was then used to outline the posterior auricular interpolation flap.  The donor area for the pedicle flap was then injected with anesthesia.  The flap was excised through the skin and subcutaneous tissue down to the layer of the underlying musculature.  The pedicle flap was carefully excised within this deep plane to maintain its blood supply.  The edges of the donor site were undermined.   The donor site was closed in a primary fashion.  The pedicle was then rotated into position and sutured.  Once the tube was sutured into place, adequate blood supply was confirmed with blanching and refill.  The pedicle was then wrapped with xeroform gauze and dressed appropriately with a telfa and gauze bandage to ensure continued blood supply and protect the attached pedicle.
Number Of Deep Sutures (Optional): 3

## 2017-10-31 ENCOUNTER — HOSPITAL ENCOUNTER (OUTPATIENT)
Dept: LAB | Facility: MEDICAL CENTER | Age: 72
End: 2017-10-31
Attending: INTERNAL MEDICINE
Payer: MEDICARE

## 2017-10-31 DIAGNOSIS — E78.5 DYSLIPIDEMIA: Chronic | ICD-10-CM

## 2017-10-31 LAB
ALBUMIN SERPL BCP-MCNC: 4.1 G/DL (ref 3.2–4.9)
ALP SERPL-CCNC: 77 U/L (ref 30–99)
ALT SERPL-CCNC: 16 U/L (ref 2–50)
APPEARANCE UR: ABNORMAL
AST SERPL-CCNC: 17 U/L (ref 12–45)
BACTERIA #/AREA URNS HPF: ABNORMAL /HPF
BILIRUB CONJ SERPL-MCNC: 0.1 MG/DL (ref 0.1–0.5)
BILIRUB INDIRECT SERPL-MCNC: 0.6 MG/DL (ref 0–1)
BILIRUB SERPL-MCNC: 0.7 MG/DL (ref 0.1–1.5)
BILIRUB UR QL STRIP.AUTO: NEGATIVE
CHOLEST SERPL-MCNC: 179 MG/DL (ref 100–199)
COLOR UR: YELLOW
CULTURE IF INDICATED INDCX: YES UA CULTURE
EPI CELLS #/AREA URNS HPF: ABNORMAL /HPF
GLUCOSE UR STRIP.AUTO-MCNC: NEGATIVE MG/DL
HDLC SERPL-MCNC: 44 MG/DL
KETONES UR STRIP.AUTO-MCNC: NEGATIVE MG/DL
LDLC SERPL CALC-MCNC: 101 MG/DL
LEUKOCYTE ESTERASE UR QL STRIP.AUTO: ABNORMAL
MICRO URNS: ABNORMAL
NITRITE UR QL STRIP.AUTO: NEGATIVE
PH UR STRIP.AUTO: 5 [PH]
PROT SERPL-MCNC: 6.7 G/DL (ref 6–8.2)
PROT UR QL STRIP: NEGATIVE MG/DL
RBC UR QL AUTO: NEGATIVE
SP GR UR STRIP.AUTO: 1.02
TRANS CELLS #/AREA URNS HPF: ABNORMAL /HPF
TRIGL SERPL-MCNC: 168 MG/DL (ref 0–149)
UROBILINOGEN UR STRIP.AUTO-MCNC: NORMAL MG/DL
WBC #/AREA URNS HPF: ABNORMAL /HPF

## 2017-10-31 PROCEDURE — 81001 URINALYSIS AUTO W/SCOPE: CPT

## 2017-10-31 PROCEDURE — 87086 URINE CULTURE/COLONY COUNT: CPT

## 2017-10-31 PROCEDURE — 36415 COLL VENOUS BLD VENIPUNCTURE: CPT

## 2017-10-31 PROCEDURE — 80076 HEPATIC FUNCTION PANEL: CPT

## 2017-10-31 PROCEDURE — 80061 LIPID PANEL: CPT

## 2017-11-01 ENCOUNTER — TELEPHONE (OUTPATIENT)
Dept: MEDICAL GROUP | Facility: MEDICAL CENTER | Age: 72
End: 2017-11-01

## 2017-11-03 ENCOUNTER — TELEPHONE (OUTPATIENT)
Dept: MEDICAL GROUP | Facility: MEDICAL CENTER | Age: 72
End: 2017-11-03

## 2017-11-03 PROBLEM — R31.29 MICROSCOPIC HEMATURIA: Status: ACTIVE | Noted: 2017-11-03

## 2017-11-03 LAB
BACTERIA UR CULT: NORMAL
SIGNIFICANT IND 70042: NORMAL
SOURCE SOURCE: NORMAL

## 2017-11-06 ENCOUNTER — APPOINTMENT (RX ONLY)
Dept: URBAN - METROPOLITAN AREA CLINIC 4 | Facility: CLINIC | Age: 72
Setting detail: DERMATOLOGY
End: 2017-11-06

## 2017-11-06 DIAGNOSIS — Z48.02 ENCOUNTER FOR REMOVAL OF SUTURES: ICD-10-CM

## 2017-11-06 PROCEDURE — ? SUTURE REMOVAL (GLOBAL PERIOD)

## 2017-11-06 PROCEDURE — ? COUNSELING

## 2017-11-06 ASSESSMENT — LOCATION DETAILED DESCRIPTION DERM: LOCATION DETAILED: RIGHT ANTERIOR PROXIMAL THIGH

## 2017-11-06 ASSESSMENT — LOCATION ZONE DERM: LOCATION ZONE: LEG

## 2017-11-06 ASSESSMENT — LOCATION SIMPLE DESCRIPTION DERM: LOCATION SIMPLE: RIGHT THIGH

## 2017-11-27 ENCOUNTER — HOSPITAL ENCOUNTER (OUTPATIENT)
Dept: LAB | Facility: MEDICAL CENTER | Age: 72
End: 2017-11-27
Attending: PHYSICIAN ASSISTANT
Payer: MEDICARE

## 2017-11-27 LAB
T4 FREE SERPL-MCNC: 1.26 NG/DL (ref 0.53–1.43)
TSH SERPL DL<=0.005 MIU/L-ACNC: 0.15 UIU/ML (ref 0.3–3.7)

## 2017-11-27 PROCEDURE — 84443 ASSAY THYROID STIM HORMONE: CPT

## 2017-11-27 PROCEDURE — 84439 ASSAY OF FREE THYROXINE: CPT

## 2017-11-27 PROCEDURE — 36415 COLL VENOUS BLD VENIPUNCTURE: CPT

## 2017-11-27 PROCEDURE — 86800 THYROGLOBULIN ANTIBODY: CPT

## 2017-11-27 PROCEDURE — 84432 ASSAY OF THYROGLOBULIN: CPT

## 2017-11-29 LAB
THYROGLOB AB SERPL-ACNC: <0.9 IU/ML (ref 0–4)
THYROGLOB SERPL-MCNC: <0.1 NG/ML (ref 1.3–31.8)
THYROGLOB SERPL-MCNC: ABNORMAL NG/ML (ref 1.3–31.8)

## 2018-01-19 ENCOUNTER — HOSPITAL ENCOUNTER (OUTPATIENT)
Dept: LAB | Facility: MEDICAL CENTER | Age: 73
End: 2018-01-19
Attending: INTERNAL MEDICINE
Payer: MEDICARE

## 2018-01-19 LAB
T4 FREE SERPL-MCNC: 1.16 NG/DL (ref 0.53–1.43)
TSH SERPL DL<=0.005 MIU/L-ACNC: 0.99 UIU/ML (ref 0.38–5.33)

## 2018-01-19 PROCEDURE — 84439 ASSAY OF FREE THYROXINE: CPT

## 2018-01-19 PROCEDURE — 84443 ASSAY THYROID STIM HORMONE: CPT

## 2018-01-19 PROCEDURE — 36415 COLL VENOUS BLD VENIPUNCTURE: CPT

## 2018-03-29 NOTE — TELEPHONE ENCOUNTER
LFT MSG REMINDING PT OF APPT W/ RBC ON 9/27/17 @ 2 & GAVE ADDRESS   Complex Repair And O-L Flap Text: The defect edges were debeveled with a #15 scalpel blade.  The primary defect was closed partially with a complex linear closure.  Given the location of the remaining defect, shape of the defect and the proximity to free margins an O-L flap was deemed most appropriate for complete closure of the defect.  Using a sterile surgical marker, an appropriate flap was drawn incorporating the defect and placing the expected incisions within the relaxed skin tension lines where possible.    The area thus outlined was incised deep to adipose tissue with a #15 scalpel blade.  The skin margins were undermined to an appropriate distance in all directions utilizing iris scissors.

## 2018-04-17 ENCOUNTER — HOSPITAL ENCOUNTER (OUTPATIENT)
Dept: LAB | Facility: MEDICAL CENTER | Age: 73
End: 2018-04-17
Attending: INTERNAL MEDICINE
Payer: MEDICARE

## 2018-04-17 LAB
T4 FREE SERPL-MCNC: 1.23 NG/DL (ref 0.53–1.43)
TSH SERPL DL<=0.005 MIU/L-ACNC: 0.72 UIU/ML (ref 0.38–5.33)

## 2018-04-17 PROCEDURE — 84443 ASSAY THYROID STIM HORMONE: CPT

## 2018-04-17 PROCEDURE — 84439 ASSAY OF FREE THYROXINE: CPT

## 2018-04-17 PROCEDURE — 36415 COLL VENOUS BLD VENIPUNCTURE: CPT

## 2018-06-04 ENCOUNTER — OFFICE VISIT (OUTPATIENT)
Dept: MEDICAL GROUP | Age: 73
End: 2018-06-04
Payer: MEDICARE

## 2018-06-04 VITALS
SYSTOLIC BLOOD PRESSURE: 108 MMHG | HEART RATE: 60 BPM | WEIGHT: 156 LBS | OXYGEN SATURATION: 96 % | BODY MASS INDEX: 26.63 KG/M2 | HEIGHT: 64 IN | TEMPERATURE: 98.2 F | DIASTOLIC BLOOD PRESSURE: 70 MMHG

## 2018-06-04 DIAGNOSIS — R05.3 CHRONIC COUGH: ICD-10-CM

## 2018-06-04 DIAGNOSIS — J40 BRONCHITIS: ICD-10-CM

## 2018-06-04 PROCEDURE — 99214 OFFICE O/P EST MOD 30 MIN: CPT | Mod: 25 | Performed by: PHYSICIAN ASSISTANT

## 2018-06-04 PROCEDURE — 94640 AIRWAY INHALATION TREATMENT: CPT | Performed by: PHYSICIAN ASSISTANT

## 2018-06-04 RX ORDER — ALBUTEROL SULFATE 2.5 MG/3ML
2.5 SOLUTION RESPIRATORY (INHALATION) ONCE
Status: COMPLETED | OUTPATIENT
Start: 2018-06-04 | End: 2018-06-04

## 2018-06-04 RX ADMIN — ALBUTEROL SULFATE 2.5 MG: 2.5 SOLUTION RESPIRATORY (INHALATION) at 14:13

## 2018-06-04 NOTE — PROGRESS NOTES
"SUBJECTIVE  Chief Complaint   Patient presents with   • Cough     Coughing up green mucus X 2 1/2 weeks   • Runny Nose     X 2 1/2 weeks   • Fatigue     X 2 1/2 weeks       HPI: Ping Das is a 72 y.o. female (Dr. Redd pt) presenting with concerns including Cough (Coughing up green mucus X 2 1/2 weeks); Runny Nose (X 2 1/2 weeks); and Fatigue (X 2 1/2 weeks).     This is a new problem.   Reports cough, runny nose and nasal congestion.  After four days of green mucus--scheduled appointment. However, today feels resolved. No Fever, chills or body aches. Admits a lot of exhaustion.   Runny nose is improving some.  Treatments tried: Nyquil at night--wasn't helping initially, however, is now. Mucinex-D during the day--seems to help with the coughing.   No hx of lung disease or smoking.       Chronic cough-- pt reports she has a chronic cough for which she has it semi-controlled with antihistamines and nasal spray. Reports some acid reflux with occasional heartburn. Tums typically resolves the heartburn.     ROS  See HPI  No fever.   No skin rashes.  No N/V/D    She  reports that she has never smoked. She has never used smokeless tobacco.    Allergies   Allergen Reactions   • Kenalog      \"goes a little nuts\"       Current Outpatient Prescriptions on File Prior to Visit   Medication Sig Dispense Refill   • atorvastatin (LIPITOR) 20 MG Tab Take 1 Tab by mouth every day. 90 Tab 2   • potassium chloride ER (KLOR-CON) 10 MEQ tablet Take 1 Tab by mouth every day. 90 Tab 3   • fluticasone (FLONASE) 50 MCG/ACT nasal spray Spray 2 Sprays in nose every day. 48 g 4   • acyclovir (ZOVIRAX) 400 MG tablet Take 400 mg by mouth 2 times a day.     • naproxen (NAPROSYN) 500 MG Tab Take 1 Tab by mouth 2 times a day as needed. 60 Tab 5   • levothyroxine (SYNTHROID) 100 MCG TABS Take 100 mcg by mouth every day. Takes 1 1/2 tabs per day        No current facility-administered medications on file prior to visit.  " "        OBJECTIVE  Vitals:    06/04/18 1337 06/04/18 1425   BP: 108/70    Pulse: 71 60   Temp: 36.8 °C (98.2 °F)    SpO2: 97% 96%   Weight: 70.8 kg (156 lb)    Height: 1.626 m (5' 4\")      Body mass index is 26.78 kg/m².       Physical Exam  Gen: alert, No conversational dyspnea. No audible wheeze, nontoxic.  PERRL, conjunctiva noninjected. No photophobia. No eye discharge.  Ears: normal pinnae. TM: pearly gray bilaterally  Nose: Nares patent with thin mucus. Mucosa edematous with erythema.  Sinuses non tender over maxillary / frontal sinuses  Throat: erythematous injection. No exudate.   Neck: supple, with  no adenopathy in the neck or supraclavicular regions  CV: Regular rate and rhythm.  Lungs: Effort is normal.  decrease air exchange diffusely. Nebulizer: Nebulized Albuteroltreatment in office. Patient tolerated procedure well. Lungs CTAB following treatment. Patient reports improvement in dyspnea.  Abdomen soft. No HSM.   Skin: warm and dry. No rash.    A/P  1. Bronchitis -Good response to NPPB treatment in office. She will call if needs inhaler for home, however, one treatment may be sufficient.   Discussed over-the-counter medications to use for symptomatic relief. Keep well-hydrated and rest.  Followup if no improvement in symptoms in 1-2 weeks, sooner if any worsening symptoms. albuterol (PROVENTIL) 2.5mg/3ml nebulizer solution 2.5 mg   2. Chronic cough - discussed potential for GERD. Encouraged a 2-4 week Prilosec trial when she is feeling well again and see if resolves cough. If after elimination of prilosec, cough continues she should follow-up with PCP.       -Treatments advised today in addition to orders above  include:  sinus rinse or nasal saline, OTC cough/cold product of patient's choice PRN, OTC antihistamines PRN, prescription for symptomatic treatment as written and fluids and rest    Followup if no improvement in symptoms in 1-2 weeks, sooner if any worsening symptoms.  "

## 2018-09-04 PROCEDURE — 90662 IIV NO PRSV INCREASED AG IM: CPT

## 2018-09-04 PROCEDURE — G0008 ADMIN INFLUENZA VIRUS VAC: HCPCS

## 2018-09-07 ENCOUNTER — IMMUNIZATION (OUTPATIENT)
Dept: SOCIAL WORK | Facility: CLINIC | Age: 73
End: 2018-09-07
Payer: MEDICARE

## 2018-09-07 ENCOUNTER — PATIENT OUTREACH (OUTPATIENT)
Dept: HEALTH INFORMATION MANAGEMENT | Facility: OTHER | Age: 73
End: 2018-09-07

## 2018-09-07 DIAGNOSIS — Z23 NEED FOR VACCINATION: ICD-10-CM

## 2018-09-07 NOTE — PROGRESS NOTES
1. Attempt #:Final    2. HealthConnect Verified: no    3. Verify PCP: yes    4. Review Care Team: yes    5. WebIZ Checked & Epic Updated: Yes  · WebIZ Recommendations: TD and SHINGRIX (Shingles)  · Is patient due for Tdap? NO  · Is patient due for Shingles? YES. Patient was not notified of copay/out of pocket cost.    6. Communication Preference Obtained: yes    7. Annual Wellness Visit Scheduling  · Scheduling Status:Scheduled       9. Care Gap Scheduling (Attempt to Schedule EACH Overdue Care Gap!)     Health Maintenance Due   Topic Date Due   • IMM ZOSTER VACCINES (2 of 3) 04/29/2014   • Annual Wellness Visit  07/19/2018   • IMM INFLUENZA (1) 09/01/2018   • MAMMOGRAM  09/05/2018        Scheduled patient for Annual Wellness Visit     10. SinCola Activation: already active    11. SinCola Navin: no    12. Virtual Visits: no    13. Opt In to Text Messages: no

## 2018-09-11 ENCOUNTER — HOSPITAL ENCOUNTER (OUTPATIENT)
Dept: RADIOLOGY | Facility: MEDICAL CENTER | Age: 73
End: 2018-09-11
Attending: INTERNAL MEDICINE
Payer: MEDICARE

## 2018-09-11 DIAGNOSIS — Z12.39 BREAST CANCER SCREENING: ICD-10-CM

## 2018-09-11 PROCEDURE — 77067 SCR MAMMO BI INCL CAD: CPT

## 2018-09-12 ENCOUNTER — TELEPHONE (OUTPATIENT)
Dept: MEDICAL GROUP | Facility: MEDICAL CENTER | Age: 73
End: 2018-09-12

## 2018-09-12 ENCOUNTER — APPOINTMENT (RX ONLY)
Dept: URBAN - METROPOLITAN AREA CLINIC 31 | Facility: CLINIC | Age: 73
Setting detail: DERMATOLOGY
End: 2018-09-12

## 2018-09-12 DIAGNOSIS — R92.30 DENSE BREAST: ICD-10-CM

## 2018-09-12 DIAGNOSIS — L90.5 SCAR CONDITIONS AND FIBROSIS OF SKIN: ICD-10-CM

## 2018-09-12 DIAGNOSIS — L82.1 OTHER SEBORRHEIC KERATOSIS: ICD-10-CM

## 2018-09-12 DIAGNOSIS — R92.2 DENSE BREAST: ICD-10-CM

## 2018-09-12 DIAGNOSIS — D22 MELANOCYTIC NEVI: ICD-10-CM

## 2018-09-12 DIAGNOSIS — L57.0 ACTINIC KERATOSIS: ICD-10-CM

## 2018-09-12 DIAGNOSIS — L81.4 OTHER MELANIN HYPERPIGMENTATION: ICD-10-CM

## 2018-09-12 PROBLEM — E05.90 THYROTOXICOSIS, UNSPECIFIED WITHOUT THYROTOXIC CRISIS OR STORM: Status: ACTIVE | Noted: 2018-09-12

## 2018-09-12 PROBLEM — D22.5 MELANOCYTIC NEVI OF TRUNK: Status: ACTIVE | Noted: 2018-09-12

## 2018-09-12 PROCEDURE — ? OBSERVATION AND MEASURE

## 2018-09-12 PROCEDURE — ? DIAGNOSIS COMMENT

## 2018-09-12 PROCEDURE — 99213 OFFICE O/P EST LOW 20 MIN: CPT | Mod: 25

## 2018-09-12 PROCEDURE — ? LIQUID NITROGEN

## 2018-09-12 PROCEDURE — ? PATIENT SPECIFIC COUNSELING

## 2018-09-12 PROCEDURE — ? COUNSELING

## 2018-09-12 PROCEDURE — 17000 DESTRUCT PREMALG LESION: CPT

## 2018-09-12 ASSESSMENT — LOCATION DETAILED DESCRIPTION DERM
LOCATION DETAILED: LEFT CENTRAL ZYGOMA
LOCATION DETAILED: RIGHT LATERAL BUTTOCK
LOCATION DETAILED: LEFT DISTAL DORSAL FOREARM
LOCATION DETAILED: RIGHT MEDIAL POSTERIOR ANKLE
LOCATION DETAILED: RIGHT DISTAL RADIAL DORSAL FOREARM
LOCATION DETAILED: LEFT MID-UPPER BACK
LOCATION DETAILED: RIGHT ANTERIOR LATERAL DISTAL THIGH
LOCATION DETAILED: LEFT INFERIOR LATERAL LOWER BACK
LOCATION DETAILED: LEFT SUPERIOR UPPER BACK

## 2018-09-12 ASSESSMENT — LOCATION ZONE DERM
LOCATION ZONE: FACE
LOCATION ZONE: TRUNK
LOCATION ZONE: LEG
LOCATION ZONE: ARM

## 2018-09-12 ASSESSMENT — LOCATION SIMPLE DESCRIPTION DERM
LOCATION SIMPLE: RIGHT THIGH
LOCATION SIMPLE: LEFT LOWER BACK
LOCATION SIMPLE: LEFT FOREARM
LOCATION SIMPLE: RIGHT FOREARM
LOCATION SIMPLE: LEFT UPPER BACK
LOCATION SIMPLE: RIGHT BUTTOCK
LOCATION SIMPLE: LEFT ZYGOMA
LOCATION SIMPLE: RIGHT ANKLE

## 2018-09-12 NOTE — TELEPHONE ENCOUNTER
Please notify the patient that:  (1) her mammogram is negative but does show dense breast tissue which may decrease the accuracy of the mammogram  (2) she can get a screening ultrasound of her breast which may provide further detail  (3) her insurance will not cover a screening breast ultrasound, she would have to pay out of pocket, the cost would be approximately $125  (4) patient did have a negative screening breast ultrasound 1 year ago, since there is no family history of breast cancer, I will leave it up to her if she would like to repeat the screening breast ultrasound.  Please let me know if she is interested

## 2018-09-12 NOTE — PROCEDURE: PATIENT SPECIFIC COUNSELING
Lesion new on face, per pt.  Patient given the option to do shave biopsy today but patient declines.  She prefers to monitor the site and recheck  in  3-4 months, see photo.
Detail Level: Simple

## 2018-10-04 ENCOUNTER — TELEPHONE (OUTPATIENT)
Dept: MEDICAL GROUP | Facility: MEDICAL CENTER | Age: 73
End: 2018-10-04

## 2018-10-04 NOTE — TELEPHONE ENCOUNTER
ANNUAL WELLNESS VISIT PRE-VISIT PLANNING WITH OUTREACH    1.  If any orders were placed at last visit, do we have Results/Consult Notes?        •  Labs - Labs were not ordered at last office visit.  Note: If patient appointment is for lab review and patient did not complete labs, check with provider if OK to reschedule patient until labs completed.       •  Imaging - Imaging was not ordered at last office visit.       •  Referrals - No referrals were ordered at last office visit.    2.  Immunizations were updated in Epic using WebIZ?:Epic matches WebIZ       •  WebIZ Recommendations: TD, SHINGRIX (Shingles) and MMR       •  Is patient due for Tdap? NO       •  Is patient due for Shingles?YES. Patient was not notified of copay/out of pocket cost.    3.  Patient has the following Care Path diagnoses on Problem List: History of depression      4.  MDX printed for Provider? NO

## 2018-10-08 NOTE — TELEPHONE ENCOUNTER
Spoke with pt and she does want to do the U/S. Please send order, she has been notified that they will call her.

## 2018-10-11 ENCOUNTER — OFFICE VISIT (OUTPATIENT)
Dept: MEDICAL GROUP | Facility: MEDICAL CENTER | Age: 73
End: 2018-10-11
Payer: MEDICARE

## 2018-10-11 ENCOUNTER — TELEPHONE (OUTPATIENT)
Dept: MEDICAL GROUP | Facility: MEDICAL CENTER | Age: 73
End: 2018-10-11

## 2018-10-11 ENCOUNTER — HOSPITAL ENCOUNTER (OUTPATIENT)
Dept: RADIOLOGY | Facility: MEDICAL CENTER | Age: 73
End: 2018-10-11
Attending: INTERNAL MEDICINE
Payer: MEDICARE

## 2018-10-11 VITALS
TEMPERATURE: 97.6 F | SYSTOLIC BLOOD PRESSURE: 108 MMHG | OXYGEN SATURATION: 96 % | HEART RATE: 56 BPM | BODY MASS INDEX: 27.06 KG/M2 | HEIGHT: 64 IN | DIASTOLIC BLOOD PRESSURE: 62 MMHG | WEIGHT: 158.5 LBS

## 2018-10-11 DIAGNOSIS — R05.9 COUGH: ICD-10-CM

## 2018-10-11 DIAGNOSIS — R73.09 IMPAIRED GLUCOSE METABOLISM: ICD-10-CM

## 2018-10-11 DIAGNOSIS — Z23 NEED FOR ZOSTER VACCINATION: ICD-10-CM

## 2018-10-11 DIAGNOSIS — Z00.00 PREVENTATIVE HEALTH CARE: Chronic | ICD-10-CM

## 2018-10-11 DIAGNOSIS — E78.5 DYSLIPIDEMIA: Chronic | ICD-10-CM

## 2018-10-11 DIAGNOSIS — Z00.00 MEDICARE ANNUAL WELLNESS VISIT, SUBSEQUENT: Primary | ICD-10-CM

## 2018-10-11 DIAGNOSIS — E55.9 HYPOVITAMINOSIS D: ICD-10-CM

## 2018-10-11 PROCEDURE — 71046 X-RAY EXAM CHEST 2 VIEWS: CPT

## 2018-10-11 PROCEDURE — G0439 PPPS, SUBSEQ VISIT: HCPCS | Performed by: INTERNAL MEDICINE

## 2018-10-11 RX ORDER — CHOLECALCIFEROL (VITAMIN D3) 125 MCG
500 CAPSULE ORAL DAILY
COMMUNITY

## 2018-10-11 ASSESSMENT — PAIN SCALES - GENERAL: PAINLEVEL: NO PAIN

## 2018-10-11 ASSESSMENT — ACTIVITIES OF DAILY LIVING (ADL): BATHING_REQUIRES_ASSISTANCE: 1

## 2018-10-11 ASSESSMENT — ENCOUNTER SYMPTOMS: GENERAL WELL-BEING: GOOD

## 2018-10-11 ASSESSMENT — PATIENT HEALTH QUESTIONNAIRE - PHQ9: CLINICAL INTERPRETATION OF PHQ2 SCORE: 0

## 2018-10-12 NOTE — TELEPHONE ENCOUNTER
Called the patient and left message, please notify her that the chest x-ray is negative, if she still has a dry cough the next step would be to obtain a breathing test of her lungs, a lung function study to check for underlying asthma or lung disease, if she is in agreement I will submit that referral request

## 2018-10-14 ENCOUNTER — TELEPHONE (OUTPATIENT)
Dept: MEDICAL GROUP | Facility: MEDICAL CENTER | Age: 73
End: 2018-10-14

## 2018-10-14 DIAGNOSIS — R05.9 COUGH: ICD-10-CM

## 2018-10-14 NOTE — TELEPHONE ENCOUNTER
Regarding: Test Result Question  Contact: [phone]  ----- Message from Piter Angel Ass't sent at 10/12/2018 10:21 AM PDT -----       ----- Message from Ping Das to Asael Redd M.D. sent at 10/12/2018  9:30 AM -----   Sorry I missed your call regarding the x-ray.  I see that the results are good.  Is there something else I should try to alleviate the dry cough  that has lasted years?  Should I make an appointment with you to discuss?      Ping Das

## 2018-10-18 ENCOUNTER — HOSPITAL ENCOUNTER (OUTPATIENT)
Dept: LAB | Facility: MEDICAL CENTER | Age: 73
End: 2018-10-18
Attending: INTERNAL MEDICINE
Payer: MEDICARE

## 2018-10-18 DIAGNOSIS — E55.9 HYPOVITAMINOSIS D: ICD-10-CM

## 2018-10-18 DIAGNOSIS — E78.5 DYSLIPIDEMIA: Chronic | ICD-10-CM

## 2018-10-18 DIAGNOSIS — R73.09 IMPAIRED GLUCOSE METABOLISM: ICD-10-CM

## 2018-10-18 LAB
ALBUMIN SERPL BCP-MCNC: 4 G/DL (ref 3.2–4.9)
ALBUMIN/GLOB SERPL: 1.4 G/DL
ALP SERPL-CCNC: 83 U/L (ref 30–99)
ALT SERPL-CCNC: 15 U/L (ref 2–50)
ANION GAP SERPL CALC-SCNC: 8 MMOL/L (ref 0–11.9)
AST SERPL-CCNC: 16 U/L (ref 12–45)
BASOPHILS # BLD AUTO: 0.7 % (ref 0–1.8)
BASOPHILS # BLD: 0.05 K/UL (ref 0–0.12)
BILIRUB SERPL-MCNC: 0.8 MG/DL (ref 0.1–1.5)
BUN SERPL-MCNC: 16 MG/DL (ref 8–22)
CALCIUM SERPL-MCNC: 9.7 MG/DL (ref 8.5–10.5)
CHLORIDE SERPL-SCNC: 103 MMOL/L (ref 96–112)
CHOLEST SERPL-MCNC: 188 MG/DL (ref 100–199)
CO2 SERPL-SCNC: 30 MMOL/L (ref 20–33)
CREAT SERPL-MCNC: 0.62 MG/DL (ref 0.5–1.4)
EOSINOPHIL # BLD AUTO: 0.12 K/UL (ref 0–0.51)
EOSINOPHIL NFR BLD: 1.6 % (ref 0–6.9)
ERYTHROCYTE [DISTWIDTH] IN BLOOD BY AUTOMATED COUNT: 42.2 FL (ref 35.9–50)
FASTING STATUS PATIENT QL REPORTED: NORMAL
GLOBULIN SER CALC-MCNC: 2.9 G/DL (ref 1.9–3.5)
GLUCOSE SERPL-MCNC: 101 MG/DL (ref 65–99)
HCT VFR BLD AUTO: 42.8 % (ref 37–47)
HDLC SERPL-MCNC: 47 MG/DL
HGB BLD-MCNC: 14.7 G/DL (ref 12–16)
IMM GRANULOCYTES # BLD AUTO: 0.04 K/UL (ref 0–0.11)
IMM GRANULOCYTES NFR BLD AUTO: 0.5 % (ref 0–0.9)
LDLC SERPL CALC-MCNC: 107 MG/DL
LYMPHOCYTES # BLD AUTO: 2.19 K/UL (ref 1–4.8)
LYMPHOCYTES NFR BLD: 29 % (ref 22–41)
MCH RBC QN AUTO: 31 PG (ref 27–33)
MCHC RBC AUTO-ENTMCNC: 34.3 G/DL (ref 33.6–35)
MCV RBC AUTO: 90.3 FL (ref 81.4–97.8)
MONOCYTES # BLD AUTO: 0.54 K/UL (ref 0–0.85)
MONOCYTES NFR BLD AUTO: 7.2 % (ref 0–13.4)
NEUTROPHILS # BLD AUTO: 4.61 K/UL (ref 2–7.15)
NEUTROPHILS NFR BLD: 61 % (ref 44–72)
NRBC # BLD AUTO: 0 K/UL
NRBC BLD-RTO: 0 /100 WBC
PLATELET # BLD AUTO: 303 K/UL (ref 164–446)
PMV BLD AUTO: 10.4 FL (ref 9–12.9)
POTASSIUM SERPL-SCNC: 3.9 MMOL/L (ref 3.6–5.5)
PROT SERPL-MCNC: 6.9 G/DL (ref 6–8.2)
RBC # BLD AUTO: 4.74 M/UL (ref 4.2–5.4)
SODIUM SERPL-SCNC: 141 MMOL/L (ref 135–145)
TRIGL SERPL-MCNC: 170 MG/DL (ref 0–149)
WBC # BLD AUTO: 7.6 K/UL (ref 4.8–10.8)

## 2018-10-18 PROCEDURE — 84439 ASSAY OF FREE THYROXINE: CPT

## 2018-10-18 PROCEDURE — 83036 HEMOGLOBIN GLYCOSYLATED A1C: CPT | Mod: GA

## 2018-10-18 PROCEDURE — 36415 COLL VENOUS BLD VENIPUNCTURE: CPT

## 2018-10-18 PROCEDURE — 80053 COMPREHEN METABOLIC PANEL: CPT

## 2018-10-18 PROCEDURE — 80061 LIPID PANEL: CPT

## 2018-10-18 PROCEDURE — 84443 ASSAY THYROID STIM HORMONE: CPT

## 2018-10-18 PROCEDURE — 85025 COMPLETE CBC W/AUTO DIFF WBC: CPT

## 2018-10-18 PROCEDURE — 82306 VITAMIN D 25 HYDROXY: CPT

## 2018-10-19 ENCOUNTER — TELEPHONE (OUTPATIENT)
Dept: MEDICAL GROUP | Facility: MEDICAL CENTER | Age: 73
End: 2018-10-19

## 2018-10-19 PROBLEM — R73.09 IMPAIRED GLUCOSE METABOLISM: Status: ACTIVE | Noted: 2018-10-19

## 2018-10-19 LAB
25(OH)D3 SERPL-MCNC: 36 NG/ML (ref 30–100)
EST. AVERAGE GLUCOSE BLD GHB EST-MCNC: 131 MG/DL
HBA1C MFR BLD: 6.2 % (ref 0–5.6)
T4 FREE SERPL-MCNC: 1.22 NG/DL (ref 0.53–1.43)
TSH SERPL DL<=0.005 MIU/L-ACNC: 0.38 UIU/ML (ref 0.38–5.33)

## 2018-10-19 NOTE — TELEPHONE ENCOUNTER
Notified with results, limit sweets, candies, carbohydrate portion size, continue activity, repeat A1c February she can call us at that time

## 2018-11-08 ENCOUNTER — HOSPITAL ENCOUNTER (OUTPATIENT)
Dept: PULMONOLOGY | Facility: MEDICAL CENTER | Age: 73
End: 2018-11-08
Attending: INTERNAL MEDICINE
Payer: MEDICARE

## 2018-11-08 PROCEDURE — 94726 PLETHYSMOGRAPHY LUNG VOLUMES: CPT | Mod: 26 | Performed by: INTERNAL MEDICINE

## 2018-11-08 PROCEDURE — 94726 PLETHYSMOGRAPHY LUNG VOLUMES: CPT

## 2018-11-08 PROCEDURE — 94729 DIFFUSING CAPACITY: CPT

## 2018-11-08 PROCEDURE — 94729 DIFFUSING CAPACITY: CPT | Mod: 26 | Performed by: INTERNAL MEDICINE

## 2018-11-08 PROCEDURE — 94060 EVALUATION OF WHEEZING: CPT

## 2018-11-08 PROCEDURE — 94060 EVALUATION OF WHEEZING: CPT | Mod: 26 | Performed by: INTERNAL MEDICINE

## 2018-11-08 RX ORDER — NAPROXEN 500 MG/1
500 TABLET ORAL 2 TIMES DAILY PRN
Qty: 60 TAB | Refills: 5 | Status: SHIPPED | OUTPATIENT
Start: 2018-11-08 | End: 2020-02-27 | Stop reason: SDUPTHER

## 2018-11-08 ASSESSMENT — PULMONARY FUNCTION TESTS
FEV1_PERCENT_PREDICTED: 87
FVC_PREDICTED: 2.76
FEV1_LLN: 1.77
FEV1/FVC_PREDICTED: 78
FEV1: 1.79
FEV1/FVC_PERCENT_PREDICTED: 94
FEV1/FVC: 73.81
FEV1_PERCENT_PREDICTED: 84
FEV1_LLN: 1.77
FEV1/FVC_PERCENT_CHANGE: 75
FEV1/FVC_PERCENT_LLN: 65
FEV1_PERCENT_CHANGE: 4
FEV1/FVC_PERCENT_PREDICTED: 97
FVC_PERCENT_PREDICTED: 91
FEV1/FVC_PERCENT_PREDICTED: 96
FEV1/FVC: 74
FEV1_PERCENT_CHANGE: 3
FVC_LLN: 2.30
FVC: 2.52
FEV1/FVC_PERCENT_PREDICTED: 77
FEV1/FVC_PERCENT_PREDICTED: 95
FEV1_PREDICTED: 2.12
FVC: 2.41
FVC_PERCENT_PREDICTED: 87
FEV1/FVC: 74
FEV1/FVC_PERCENT_CHANGE: 0
FEV1/FVC: 74
FEV1: 1.86
FVC_LLN: 2.30
FEV1/FVC_PERCENT_LLN: 65

## 2018-11-12 ENCOUNTER — TELEPHONE (OUTPATIENT)
Dept: MEDICAL GROUP | Facility: MEDICAL CENTER | Age: 73
End: 2018-11-12

## 2018-11-12 RX ORDER — OMEPRAZOLE 40 MG/1
40 CAPSULE, DELAYED RELEASE ORAL DAILY
Qty: 30 CAP | Refills: 5 | Status: SHIPPED | OUTPATIENT
Start: 2018-11-12 | End: 2020-01-06

## 2018-11-12 NOTE — PROCEDURES
PULMONARY FUNCTION TEST    DATE OF TEST:  11/08/2018    Technician comments noted, the patient had good effort and cooperation.  The   results of the test meet the ATS standards for acceptability and   repeatability.    SPIROMETRY:  1.  FVC was 2.52 liters, which is 91% of predicted.  2.  FEV1 was 1.86 liters, 87% of predicted.  3.  FEV1/FVC ratio was normal at 74%.  4.  There was no significant response to bronchodilators.  5.  Flow volume loop had normal shape and size.    LUNG VOLUMES:  1.  Residual volume was 2.13 liters, 94% predicted.  2.  Total lung capacity was 4.54 liters, 89% predicted.    Diffusion capacity was normal at 120% predicted.    IMPRESSION:  The patient's pulmonary function test does not show any   obstructive or restrictive process other than slight increase in diffusion   capacity to 120% of predicted.  The patient mostly had normal pulmonary   function test.  Clinical correlation is required.       ____________________________________     MD MICAELA Julio / JOE    DD:  11/11/2018 16:25:28  DT:  11/11/2018 16:38:54    D#:  [id number]  Job#:  [id number]

## 2018-11-13 NOTE — TELEPHONE ENCOUNTER
Notified PFT results negative, cough is somewhat improved with Prilosec typically occurring the morning, increase to 40 mg prescription to pharmacy

## 2018-11-14 ENCOUNTER — TELEPHONE (OUTPATIENT)
Dept: MEDICAL GROUP | Facility: MEDICAL CENTER | Age: 73
End: 2018-11-14

## 2018-11-14 ENCOUNTER — HOSPITAL ENCOUNTER (OUTPATIENT)
Dept: RADIOLOGY | Facility: MEDICAL CENTER | Age: 73
End: 2018-11-14
Attending: INTERNAL MEDICINE
Payer: MEDICARE

## 2018-11-14 DIAGNOSIS — R92.2 DENSE BREAST: ICD-10-CM

## 2018-11-14 DIAGNOSIS — R92.30 DENSE BREAST: ICD-10-CM

## 2018-11-14 PROCEDURE — 76641 ULTRASOUND BREAST COMPLETE: CPT

## 2018-11-15 ENCOUNTER — TELEPHONE (OUTPATIENT)
Dept: MEDICAL GROUP | Facility: MEDICAL CENTER | Age: 73
End: 2018-11-15

## 2018-11-15 NOTE — TELEPHONE ENCOUNTER
Called patient and left message.  Please notify her that the screening breast ultrasound is negative

## 2018-11-15 NOTE — TELEPHONE ENCOUNTER
Phone Number Called: [phone] (home)       Message: Patient notified of results.       Left Message for patient to call back:N\A

## 2018-11-15 NOTE — TELEPHONE ENCOUNTER
----- Message from Asael Redd M.D. sent at 11/14/2018  4:49 PM PST -----  Called patient and left message.  Please notify her that the screening breast ultrasound is negative

## 2018-11-27 ENCOUNTER — HOSPITAL ENCOUNTER (OUTPATIENT)
Dept: LAB | Facility: MEDICAL CENTER | Age: 73
End: 2018-11-27
Attending: OBSTETRICS & GYNECOLOGY
Payer: MEDICARE

## 2018-11-27 PROCEDURE — 88175 CYTOPATH C/V AUTO FLUID REDO: CPT

## 2018-11-28 LAB — CYTOLOGY REG CYTOL: NORMAL

## 2019-01-07 ENCOUNTER — APPOINTMENT (RX ONLY)
Dept: URBAN - METROPOLITAN AREA CLINIC 4 | Facility: CLINIC | Age: 74
Setting detail: DERMATOLOGY
End: 2019-01-07

## 2019-01-07 DIAGNOSIS — L81.4 OTHER MELANIN HYPERPIGMENTATION: ICD-10-CM | Status: STABLE

## 2019-01-07 PROCEDURE — ? DIAGNOSIS COMMENT

## 2019-01-07 PROCEDURE — ? OBSERVATION AND MEASURE

## 2019-01-07 PROCEDURE — ? COUNSELING

## 2019-01-07 PROCEDURE — 99212 OFFICE O/P EST SF 10 MIN: CPT

## 2019-01-07 ASSESSMENT — LOCATION DETAILED DESCRIPTION DERM: LOCATION DETAILED: LEFT CENTRAL ZYGOMA

## 2019-01-07 ASSESSMENT — LOCATION ZONE DERM: LOCATION ZONE: FACE

## 2019-01-07 ASSESSMENT — LOCATION SIMPLE DESCRIPTION DERM: LOCATION SIMPLE: LEFT ZYGOMA

## 2019-01-07 NOTE — PROCEDURE: COUNSELING
Detail Level: Simple
Patient Specific Counseling (Will Not Stick From Patient To Patient): *\\n\\nNo changes noted when compared to the previously taken photo.  Patient will monitor site closely and follow up in 6 months, sooner if needed.

## 2019-01-07 NOTE — PROCEDURE: OBSERVATION
Morphology Per Location (Optional): irregularly pigmented macule (see photo)
Detail Level: Detailed
Size Of Lesion: 7 mm

## 2019-01-09 ENCOUNTER — OFFICE VISIT (OUTPATIENT)
Dept: MEDICAL GROUP | Age: 74
End: 2019-01-09
Payer: MEDICARE

## 2019-01-09 VITALS
BODY MASS INDEX: 26.6 KG/M2 | OXYGEN SATURATION: 92 % | HEART RATE: 75 BPM | DIASTOLIC BLOOD PRESSURE: 72 MMHG | SYSTOLIC BLOOD PRESSURE: 138 MMHG | TEMPERATURE: 96.1 F | HEIGHT: 64 IN | WEIGHT: 155.8 LBS

## 2019-01-09 DIAGNOSIS — R05.3 CHRONIC COUGH: ICD-10-CM

## 2019-01-09 DIAGNOSIS — J45.909 ACUTE ASTHMATIC BRONCHITIS: ICD-10-CM

## 2019-01-09 DIAGNOSIS — E78.5 DYSLIPIDEMIA: Chronic | ICD-10-CM

## 2019-01-09 DIAGNOSIS — R73.09 IMPAIRED GLUCOSE METABOLISM: ICD-10-CM

## 2019-01-09 PROCEDURE — 99214 OFFICE O/P EST MOD 30 MIN: CPT | Performed by: INTERNAL MEDICINE

## 2019-01-09 RX ORDER — AMOXICILLIN AND CLAVULANATE POTASSIUM 875; 125 MG/1; MG/1
1 TABLET, FILM COATED ORAL 2 TIMES DAILY
Qty: 20 QUANTITY SUFFICIENT | Refills: 1 | Status: SHIPPED | OUTPATIENT
Start: 2019-01-09 | End: 2020-01-06

## 2019-01-09 ASSESSMENT — ENCOUNTER SYMPTOMS
GASTROINTESTINAL NEGATIVE: 1
PSYCHIATRIC NEGATIVE: 1
SPUTUM PRODUCTION: 0
COUGH: 1
NEUROLOGICAL NEGATIVE: 1
MUSCULOSKELETAL NEGATIVE: 1
CARDIOVASCULAR NEGATIVE: 1
EYES NEGATIVE: 1

## 2019-01-09 NOTE — PROGRESS NOTES
Subjective:      Ping Das is a 73 y.o. female who presents with Cough (x7 weeks )        HPI    Patient reports dry cough, sneezing, and runny nose for 7 weeks. She reports associated fatigue. She notes she has a chronic cough for 6 months but this is worse. She states she had chest xray and pulmonary function test in the fall which were normal. She denies history of smoking or hypertension. She denies fever, chills, headache, muscle aches, joint pain, chest pain, or shortness of breath. She had her influenza vaccine this year.    Patient Active Problem List   Diagnosis   • History of thyroid cancer   • Dyslipidemia   • History of depression   • Tubular adenoma of colon   • Preventative health care   • Osteopenia   • Ocular migraine   • Cough   • History of compression fracture of spine   • HSV infection   • Microscopic hematuria   • Impaired glucose metabolism       Outpatient Medications Prior to Visit   Medication Sig Dispense Refill   • omeprazole (PRILOSEC) 40 MG delayed-release capsule Take 1 Cap by mouth every day. 30 Cap 5   • naproxen (NAPROSYN) 500 MG Tab Take 1 Tab by mouth 2 times a day as needed (pain). 60 Tab 5   • potassium chloride ER (KLOR-CON) 10 MEQ tablet Take 1 Tab by mouth every day. 90 Tab 3   • atorvastatin (LIPITOR) 20 MG Tab Take 1 Tab by mouth every day. 90 Tab 2   • Multiple Vitamin (MULTI-DAY PO) Take  by mouth.     • Calcium Carbonate-Vitamin D (CALCIUM 600/VITAMIN D PO) Take  by mouth.     • cyanocobalamin (VITAMIN B-12) 500 MCG Tab Take 500 mcg by mouth every day.     • Cholecalciferol (VITAMIN D3 PO) Take  by mouth.     • Glucosamine HCl (GLUCOSAMINE PO) Take  by mouth.     • Omega-3 Fatty Acids (OMEGA 3 PO) Take  by mouth.     • Loratadine (CLARITIN PO) Take  by mouth.     • Cetirizine HCl (ZYRTEC ALLERGY PO) Take  by mouth.     • fluticasone (FLONASE) 50 MCG/ACT nasal spray Spray 2 Sprays in nose every day. 48 g 4   • levothyroxine (SYNTHROID) 100 MCG TABS Take 100 mcg by  "mouth every day. Takes 1 1/2 tabs per day      • acyclovir (ZOVIRAX) 400 MG tablet Take 400 mg by mouth 2 times a day.       No facility-administered medications prior to visit.         Allergies   Allergen Reactions   • Kenalog      \"goes a little nuts\"       Review of Systems   Constitutional: Positive for malaise/fatigue.   HENT: Positive for congestion.    Eyes: Negative.    Respiratory: Positive for cough. Negative for sputum production.    Cardiovascular: Negative.    Gastrointestinal: Negative.    Genitourinary: Negative.    Musculoskeletal: Negative.    Skin: Negative.    Neurological: Negative.    Endo/Heme/Allergies: Negative.    Psychiatric/Behavioral: Negative.    All other systems reviewed and are negative.           Objective:     /72 (BP Location: Right arm, Patient Position: Sitting)   Pulse 73   Temp 36.3 °C (97.3 °F) (Temporal)   Ht 1.562 m (5' 1.5\")   Wt 117.5 kg (259 lb)   SpO2 95%   BMI 48.15 kg/m²      Physical Exam   Constitutional: Oriented to person, place, and time. Appears well-developed and well-nourished. No distress.   Head: Normocephalic and atraumatic.   Right Ear: External ear normal.   Left Ear: External ear normal.   Nose: Nose normal.   Mouth/Throat: Oropharynx is clear and moist. No oropharyngeal exudate.   Eyes: Pupils are equal, round, and reactive to light. Conjunctivae and EOM are normal. Right eye exhibits no discharge. Left eye exhibits no discharge. No scleral icterus.   Neck: Normal range of motion. Neck supple. No JVD present. No tracheal deviation present. No thyromegaly present.   Cardiovascular: Normal rate, regular rhythm, normal heart sounds and intact distal pulses.  Exam reveals no gallop and no friction rub.    No murmur heard.  Pulmonary/Chest: Effort normal. No stridor. No respiratory distress. No wheezing or rales. No tenderness.   Abdominal: Soft. Bowel sounds are normal. No distension and no mass. There is no tenderness. There is no rebound and " no guarding. No hernia.   Musculoskeletal: Normal range of motion No edema or tenderness.   Lymphadenopathy: No cervical adenopathy.   Neurological: Alert and oriented to person, place, and time. Normal reflexes. Normal reflexes. No cranial nerve deficit. Normal muscle tone. Coordination normal.   Skin: Skin is warm and dry. No rash noted. Not diaphoretic. No erythema. No pallor.   Psychiatric: Normal mood and affect. Behavior is normal. Judgment and thought content normal.   Nursing note and vitals reviewed.      Lab Results   Component Value Date/Time    HBA1C 6.2 (H) 10/18/2018 08:26 AM     Lab Results   Component Value Date/Time    SODIUM 141 10/18/2018 08:26 AM    POTASSIUM 3.9 10/18/2018 08:26 AM    CHLORIDE 103 10/18/2018 08:26 AM    CO2 30 10/18/2018 08:26 AM    GLUCOSE 101 (H) 10/18/2018 08:26 AM    BUN 16 10/18/2018 08:26 AM    CREATININE 0.62 10/18/2018 08:26 AM    CREATININE 0.64 06/23/2011 07:50 AM    BUNCREATRAT 19 06/23/2011 07:50 AM    GLOMRATE >59 04/29/2010 08:00 AM    ALKPHOSPHAT 83 10/18/2018 08:26 AM    ASTSGOT 16 10/18/2018 08:26 AM    ALTSGPT 15 10/18/2018 08:26 AM    TBILIRUBIN 0.8 10/18/2018 08:26 AM     No results found for: INR  Lab Results   Component Value Date/Time    CHOLSTRLTOT 188 10/18/2018 08:26 AM     (H) 10/18/2018 08:26 AM    HDL 47 10/18/2018 08:26 AM    TRIGLYCERIDE 170 (H) 10/18/2018 08:26 AM       No results found for: TESTOSTERONE  No results found for: TSH  Lab Results   Component Value Date/Time    FREET4 1.22 10/18/2018 08:25 AM    FREET4 1.23 04/17/2018 09:37 AM     No results found for: URICACID  No components found for: VITB12  Lab Results   Component Value Date/Time    25HYDROXY 36 10/18/2018 08:26 AM    25HYDROXY 42 09/05/2017 08:20 AM          Assessment/Plan:       1. Acute asthmatic bronchitis  Follow up with PCP if symptoms persist. Plan to treat with:  - amoxicillin-clavulanate (AUGMENTIN) 875-125 MG Tab; Take 1 Tab by mouth 2 times a day.  Dispense: 20  Quantity Sufficient; Refill: 1    2. Impaired glucose metabolism  Under good control. Continue same regimen of high protein, low carb diet.      3. Dyslipidemia  Under good control. Continue same regimen.    4. Chronic cough-normal chest x-ray and PFTs.  Rule out secondary causes such as GERD or chronic allergic rhinitis with postnasal drip versus hypersensitivity pneumonitis or other primary lung disease    Plan to follow up with PCP for further evaluation with chest x-ray or CT.      30minute face-to-face encounter took place today.  More than half of this time was spent in the coordination of care of the above problems, as well as counseling.      Mark DUMONT (Sean), am scribing for, and in the presence of, William Avery M.D..    Electronically signed by: Mark Calles (Sean), 1/9/2019    IWliliam M.D., personally performed the services described in this documentation, as scribed by Mark Calles in my presence, and it is both accurate and complete.

## 2019-01-23 ENCOUNTER — HOSPITAL ENCOUNTER (OUTPATIENT)
Dept: LAB | Facility: MEDICAL CENTER | Age: 74
End: 2019-01-23
Attending: INTERNAL MEDICINE
Payer: MEDICARE

## 2019-01-23 LAB
T4 FREE SERPL-MCNC: 0.98 NG/DL (ref 0.53–1.43)
TSH SERPL DL<=0.005 MIU/L-ACNC: 1.66 UIU/ML (ref 0.38–5.33)

## 2019-01-23 PROCEDURE — 84439 ASSAY OF FREE THYROXINE: CPT

## 2019-01-23 PROCEDURE — 84432 ASSAY OF THYROGLOBULIN: CPT

## 2019-01-23 PROCEDURE — 86800 THYROGLOBULIN ANTIBODY: CPT

## 2019-01-23 PROCEDURE — 36415 COLL VENOUS BLD VENIPUNCTURE: CPT

## 2019-01-23 PROCEDURE — 84443 ASSAY THYROID STIM HORMONE: CPT

## 2019-06-21 ENCOUNTER — HOSPITAL ENCOUNTER (OUTPATIENT)
Dept: LAB | Facility: MEDICAL CENTER | Age: 74
End: 2019-06-21
Attending: INTERNAL MEDICINE
Payer: MEDICARE

## 2019-06-21 LAB
T4 FREE SERPL-MCNC: 1.13 NG/DL (ref 0.53–1.43)
TSH SERPL DL<=0.005 MIU/L-ACNC: 0.37 UIU/ML (ref 0.38–5.33)

## 2019-06-21 PROCEDURE — 84443 ASSAY THYROID STIM HORMONE: CPT

## 2019-06-21 PROCEDURE — 36415 COLL VENOUS BLD VENIPUNCTURE: CPT

## 2019-06-21 PROCEDURE — 84439 ASSAY OF FREE THYROXINE: CPT

## 2019-07-08 ENCOUNTER — APPOINTMENT (RX ONLY)
Dept: URBAN - METROPOLITAN AREA CLINIC 4 | Facility: CLINIC | Age: 74
Setting detail: DERMATOLOGY
End: 2019-07-08

## 2019-07-08 DIAGNOSIS — L82.1 OTHER SEBORRHEIC KERATOSIS: ICD-10-CM

## 2019-07-08 DIAGNOSIS — D22 MELANOCYTIC NEVI: ICD-10-CM

## 2019-07-08 DIAGNOSIS — L90.5 SCAR CONDITIONS AND FIBROSIS OF SKIN: ICD-10-CM

## 2019-07-08 DIAGNOSIS — L81.4 OTHER MELANIN HYPERPIGMENTATION: ICD-10-CM | Status: STABLE

## 2019-07-08 PROBLEM — D23.71 OTHER BENIGN NEOPLASM OF SKIN OF RIGHT LOWER LIMB, INCLUDING HIP: Status: ACTIVE | Noted: 2019-07-08

## 2019-07-08 PROBLEM — D48.5 NEOPLASM OF UNCERTAIN BEHAVIOR OF SKIN: Status: ACTIVE | Noted: 2019-07-08

## 2019-07-08 PROBLEM — D22.5 MELANOCYTIC NEVI OF TRUNK: Status: ACTIVE | Noted: 2019-07-08

## 2019-07-08 PROCEDURE — ? OBSERVATION AND MEASURE

## 2019-07-08 PROCEDURE — ? DEFER

## 2019-07-08 PROCEDURE — ? DIAGNOSIS COMMENT

## 2019-07-08 PROCEDURE — ? ADDITIONAL NOTES

## 2019-07-08 PROCEDURE — ? COUNSELING

## 2019-07-08 PROCEDURE — 99213 OFFICE O/P EST LOW 20 MIN: CPT

## 2019-07-08 ASSESSMENT — LOCATION SIMPLE DESCRIPTION DERM
LOCATION SIMPLE: LEFT LOWER BACK
LOCATION SIMPLE: LEFT ZYGOMA
LOCATION SIMPLE: RIGHT THIGH
LOCATION SIMPLE: RIGHT BUTTOCK
LOCATION SIMPLE: LEFT UPPER BACK
LOCATION SIMPLE: RIGHT FOREARM
LOCATION SIMPLE: RIGHT ANKLE

## 2019-07-08 ASSESSMENT — LOCATION ZONE DERM
LOCATION ZONE: LEG
LOCATION ZONE: TRUNK
LOCATION ZONE: FACE
LOCATION ZONE: ARM

## 2019-07-08 ASSESSMENT — LOCATION DETAILED DESCRIPTION DERM
LOCATION DETAILED: RIGHT MEDIAL POSTERIOR ANKLE
LOCATION DETAILED: LEFT SUPERIOR UPPER BACK
LOCATION DETAILED: LEFT CENTRAL ZYGOMA
LOCATION DETAILED: LEFT INFERIOR LATERAL LOWER BACK
LOCATION DETAILED: RIGHT DISTAL RADIAL DORSAL FOREARM
LOCATION DETAILED: RIGHT ANTERIOR LATERAL DISTAL THIGH
LOCATION DETAILED: RIGHT LATERAL BUTTOCK

## 2019-07-08 NOTE — PROCEDURE: DIAGNOSIS COMMENT
Comment: See Photo from 09/12/2018
Detail Level: Detailed
Comment: History of skin cancers
Comment: See photos. 7/8/19

## 2019-07-08 NOTE — PROCEDURE: ADDITIONAL NOTES
Detail Level: Detailed
Additional Notes: The patient declined a biopsy today and prefers to monitor the site for now. We will recheck it in 4 months. If still present we may biopsy to r/o a skin cancer

## 2019-07-08 NOTE — PROCEDURE: DEFER
Introduction Text (Please End With A Colon): The following procedure was deferred:  monitor the lesion for now. We will re-check it in 4 months. If still present we may do a biopsy to r/o a skin cancer
Detail Level: Detailed

## 2019-07-08 NOTE — PROCEDURE: OBSERVATION
Detail Level: Detailed
Size Of Lesion: 7 mm
Morphology Per Location (Optional): irregularly pigmented macule (see photo)

## 2019-07-08 NOTE — PROCEDURE: COUNSELING
Patient Specific Counseling (Will Not Stick From Patient To Patient): *\\n\\nNo changes noted when compared to the previously taken photo.  Patient will monitor site closely and follow up in 6 months, sooner if needed.
Detail Level: Simple
Detail Level: Detailed
Detail Level: Generalized

## 2019-07-10 ENCOUNTER — HOSPITAL ENCOUNTER (OUTPATIENT)
Dept: LAB | Facility: MEDICAL CENTER | Age: 74
End: 2019-07-10
Attending: PHYSICIAN ASSISTANT
Payer: MEDICARE

## 2019-07-10 LAB
T4 FREE SERPL-MCNC: 1.27 NG/DL (ref 0.53–1.43)
TSH SERPL DL<=0.005 MIU/L-ACNC: 0.45 UIU/ML (ref 0.38–5.33)

## 2019-07-10 PROCEDURE — 84439 ASSAY OF FREE THYROXINE: CPT

## 2019-07-10 PROCEDURE — 36415 COLL VENOUS BLD VENIPUNCTURE: CPT

## 2019-07-10 PROCEDURE — 84443 ASSAY THYROID STIM HORMONE: CPT

## 2019-07-17 DIAGNOSIS — J30.9 ALLERGIC RHINITIS: ICD-10-CM

## 2019-07-17 DIAGNOSIS — J30.1 ALLERGIC RHINITIS DUE TO POLLEN, UNSPECIFIED SEASONALITY: ICD-10-CM

## 2019-07-17 RX ORDER — FLUTICASONE PROPIONATE 50 MCG
2 SPRAY, SUSPENSION (ML) NASAL DAILY
Qty: 48 G | Refills: 3 | Status: SHIPPED | OUTPATIENT
Start: 2019-07-17 | End: 2020-07-28 | Stop reason: SDUPTHER

## 2019-07-17 RX ORDER — FLUTICASONE PROPIONATE 50 MCG
SPRAY, SUSPENSION (ML) NASAL
Qty: 48 G | Refills: 3 | Status: SHIPPED | OUTPATIENT
Start: 2019-07-17 | End: 2019-07-17 | Stop reason: SDUPTHER

## 2019-10-03 ENCOUNTER — OFFICE VISIT (OUTPATIENT)
Dept: URGENT CARE | Facility: MEDICAL CENTER | Age: 74
End: 2019-10-03
Payer: MEDICARE

## 2019-10-03 VITALS
DIASTOLIC BLOOD PRESSURE: 64 MMHG | HEIGHT: 64 IN | WEIGHT: 150 LBS | TEMPERATURE: 98.5 F | HEART RATE: 80 BPM | OXYGEN SATURATION: 97 % | BODY MASS INDEX: 25.61 KG/M2 | SYSTOLIC BLOOD PRESSURE: 120 MMHG

## 2019-10-03 DIAGNOSIS — S39.012A STRAIN OF LUMBAR REGION, INITIAL ENCOUNTER: ICD-10-CM

## 2019-10-03 PROCEDURE — 99214 OFFICE O/P EST MOD 30 MIN: CPT | Performed by: PHYSICIAN ASSISTANT

## 2019-10-03 RX ORDER — CYCLOBENZAPRINE HCL 5 MG
5-10 TABLET ORAL 3 TIMES DAILY PRN
Qty: 30 TAB | Refills: 0 | Status: SHIPPED | OUTPATIENT
Start: 2019-10-03 | End: 2020-01-06

## 2019-10-03 ASSESSMENT — ENCOUNTER SYMPTOMS
SHORTNESS OF BREATH: 0
HEADACHES: 0
SENSORY CHANGE: 0
BLURRED VISION: 0
BACK PAIN: 1
NAUSEA: 0
ABDOMINAL PAIN: 0
BOWEL INCONTINENCE: 0
WEIGHT LOSS: 0
PERIANAL NUMBNESS: 0
TINGLING: 0
PALPITATIONS: 0
WEAKNESS: 0
LEG PAIN: 0
VOMITING: 0
CHILLS: 0
FEVER: 0
PARESTHESIAS: 0
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
## 2019-10-16 ENCOUNTER — HOSPITAL ENCOUNTER (OUTPATIENT)
Dept: LAB | Facility: MEDICAL CENTER | Age: 74
End: 2019-10-16
Attending: PHYSICIAN ASSISTANT
Payer: MEDICARE

## 2019-10-16 LAB
T4 FREE SERPL-MCNC: 1.3 NG/DL (ref 0.53–1.43)
TSH SERPL DL<=0.005 MIU/L-ACNC: 0.46 UIU/ML (ref 0.38–5.33)

## 2019-10-16 PROCEDURE — 36415 COLL VENOUS BLD VENIPUNCTURE: CPT

## 2019-10-16 PROCEDURE — 84439 ASSAY OF FREE THYROXINE: CPT

## 2019-10-16 PROCEDURE — 84443 ASSAY THYROID STIM HORMONE: CPT

## 2019-12-16 ENCOUNTER — APPOINTMENT (RX ONLY)
Dept: URBAN - METROPOLITAN AREA CLINIC 4 | Facility: CLINIC | Age: 74
Setting detail: DERMATOLOGY
End: 2019-12-16

## 2019-12-16 DIAGNOSIS — L82.1 OTHER SEBORRHEIC KERATOSIS: ICD-10-CM

## 2019-12-16 DIAGNOSIS — L81.4 OTHER MELANIN HYPERPIGMENTATION: ICD-10-CM

## 2019-12-16 DIAGNOSIS — L90.5 SCAR CONDITIONS AND FIBROSIS OF SKIN: ICD-10-CM

## 2019-12-16 PROBLEM — D48.5 NEOPLASM OF UNCERTAIN BEHAVIOR OF SKIN: Status: ACTIVE | Noted: 2019-12-16

## 2019-12-16 PROCEDURE — ? BIOPSY BY SHAVE METHOD

## 2019-12-16 PROCEDURE — ? COUNSELING

## 2019-12-16 PROCEDURE — 99213 OFFICE O/P EST LOW 20 MIN: CPT | Mod: 25

## 2019-12-16 PROCEDURE — ? ADDITIONAL NOTES

## 2019-12-16 PROCEDURE — ? OBSERVATION AND MEASURE

## 2019-12-16 PROCEDURE — ? DIAGNOSIS COMMENT

## 2019-12-16 PROCEDURE — 11102 TANGNTL BX SKIN SINGLE LES: CPT

## 2019-12-16 ASSESSMENT — LOCATION SIMPLE DESCRIPTION DERM
LOCATION SIMPLE: RIGHT THIGH
LOCATION SIMPLE: LEFT ZYGOMA
LOCATION SIMPLE: LEFT UPPER BACK
LOCATION SIMPLE: LEFT LOWER BACK
LOCATION SIMPLE: RIGHT FOREARM
LOCATION SIMPLE: RIGHT ANKLE

## 2019-12-16 ASSESSMENT — LOCATION ZONE DERM
LOCATION ZONE: FACE
LOCATION ZONE: ARM
LOCATION ZONE: LEG
LOCATION ZONE: TRUNK

## 2019-12-16 ASSESSMENT — LOCATION DETAILED DESCRIPTION DERM
LOCATION DETAILED: RIGHT ANTERIOR LATERAL DISTAL THIGH
LOCATION DETAILED: LEFT CENTRAL ZYGOMA
LOCATION DETAILED: LEFT SUPERIOR UPPER BACK
LOCATION DETAILED: LEFT INFERIOR LATERAL LOWER BACK
LOCATION DETAILED: RIGHT DISTAL RADIAL DORSAL FOREARM
LOCATION DETAILED: RIGHT MEDIAL POSTERIOR ANKLE

## 2019-12-16 NOTE — PROCEDURE: BIOPSY BY SHAVE METHOD
Consent: Written consent was obtained and risks were reviewed including but not limited to scarring, infection, bleeding, scabbing, incomplete removal, nerve damage and allergy to anesthesia.
Depth Of Biopsy: dermis
Electrodesiccation Text: The wound bed was treated with electrodesiccation after the biopsy was performed.
Biopsy Type: H and E
X Size Of Lesion In Cm: 0
Bill For Surgical Tray: no
Notification Instructions: Patient will be notified of biopsy results. However, patient instructed to call the office if not contacted within 2 weeks.
Wound Care: Petrolatum
Dressing: bandage
Render Post-Care Instructions In Note?: yes
Anesthesia Volume In Cc: 1
Type Of Destruction Used: Cryotherapy
Electrodesiccation And Curettage Text: The wound bed was treated with electrodesiccation and curettage after the biopsy was performed.
Biopsy Method: Personna blade
Hemostasis: Aluminum Chloride
Curettage Text: The wound bed was treated with curettage after the biopsy was performed.
Body Location Override (Optional - Billing Will Still Be Based On Selected Body Map Location If Applicable): left upper back
Silver Nitrate Text: The wound bed was treated with silver nitrate after the biopsy was performed.
Lab Facility: 
Anesthesia Type: 1% lidocaine with 1:100,000 epinephrine and a 1:10 solution of 8.4% sodium bicarbonate
Size Of Lesion In Cm: 0.5
Cryotherapy Text: The wound bed was treated with cryotherapy after the biopsy was performed.
Post-Care Instructions: I reviewed with the patient in detail post-care instructions. Patient is to keep the biopsy site dry overnight, and then apply bacitracin/petroleum twice daily until healed.
Detail Level: Detailed
Billing Type: Third-Party Bill
Lab: [id number]

## 2019-12-16 NOTE — PROCEDURE: COUNSELING
Detail Level: Detailed
Detail Level: Generalized
Patient Specific Counseling (Will Not Stick From Patient To Patient): *\\n\\nNo changes noted when compared to the previously taken photo.  Patient will monitor site closely and follow up in 6 months, sooner if needed.
Detail Level: Simple

## 2019-12-16 NOTE — PROCEDURE: MIPS QUALITY
Quality 110: Preventive Care And Screening: Influenza Immunization: Influenza Immunization Administered during Influenza season
Quality 111:Pneumonia Vaccination Status For Older Adults: Pneumococcal Vaccination Previously Received
Detail Level: Detailed
Quality 130: Documentation Of Current Medications In The Medical Record: Current Medications Documented
Quality 402: Tobacco Use And Help With Quitting Among Adolescents: Patient screened for tobacco and never smoked

## 2019-12-30 ENCOUNTER — HOSPITAL ENCOUNTER (OUTPATIENT)
Dept: LAB | Facility: MEDICAL CENTER | Age: 74
End: 2019-12-30
Attending: INTERNAL MEDICINE
Payer: MEDICARE

## 2019-12-30 LAB
T4 FREE SERPL-MCNC: 1.02 NG/DL (ref 0.53–1.43)
TSH SERPL DL<=0.005 MIU/L-ACNC: 0.6 UIU/ML (ref 0.38–5.33)

## 2019-12-30 PROCEDURE — 84439 ASSAY OF FREE THYROXINE: CPT

## 2019-12-30 PROCEDURE — 84443 ASSAY THYROID STIM HORMONE: CPT

## 2019-12-30 PROCEDURE — 36415 COLL VENOUS BLD VENIPUNCTURE: CPT

## 2020-01-06 ENCOUNTER — TELEPHONE (OUTPATIENT)
Dept: MEDICAL GROUP | Facility: MEDICAL CENTER | Age: 75
End: 2020-01-06

## 2020-01-06 ENCOUNTER — OFFICE VISIT (OUTPATIENT)
Dept: MEDICAL GROUP | Facility: MEDICAL CENTER | Age: 75
End: 2020-01-06
Payer: MEDICARE

## 2020-01-06 VITALS
BODY MASS INDEX: 26.4 KG/M2 | WEIGHT: 154.6 LBS | OXYGEN SATURATION: 97 % | HEIGHT: 64 IN | TEMPERATURE: 96.9 F | HEART RATE: 72 BPM | DIASTOLIC BLOOD PRESSURE: 74 MMHG | SYSTOLIC BLOOD PRESSURE: 138 MMHG

## 2020-01-06 DIAGNOSIS — J30.1 ALLERGIC RHINITIS DUE TO POLLEN, UNSPECIFIED SEASONALITY: ICD-10-CM

## 2020-01-06 DIAGNOSIS — R73.09 IMPAIRED GLUCOSE METABOLISM: ICD-10-CM

## 2020-01-06 DIAGNOSIS — K21.9 GASTROESOPHAGEAL REFLUX DISEASE WITHOUT ESOPHAGITIS: ICD-10-CM

## 2020-01-06 DIAGNOSIS — Z11.59 NEED FOR HEPATITIS C SCREENING TEST: ICD-10-CM

## 2020-01-06 DIAGNOSIS — M81.0 POSTMENOPAUSAL BONE LOSS: ICD-10-CM

## 2020-01-06 DIAGNOSIS — E55.9 HYPOVITAMINOSIS D: ICD-10-CM

## 2020-01-06 DIAGNOSIS — Z00.00 MEDICARE ANNUAL WELLNESS VISIT, SUBSEQUENT: ICD-10-CM

## 2020-01-06 DIAGNOSIS — Z85.850 HISTORY OF THYROID CANCER: Chronic | ICD-10-CM

## 2020-01-06 DIAGNOSIS — Z12.31 ENCOUNTER FOR SCREENING MAMMOGRAM FOR BREAST CANCER: ICD-10-CM

## 2020-01-06 DIAGNOSIS — Z00.00 PREVENTATIVE HEALTH CARE: Chronic | ICD-10-CM

## 2020-01-06 DIAGNOSIS — L98.9 SKIN LESION: ICD-10-CM

## 2020-01-06 DIAGNOSIS — E78.5 DYSLIPIDEMIA: ICD-10-CM

## 2020-01-06 DIAGNOSIS — R05.9 COUGH: ICD-10-CM

## 2020-01-06 DIAGNOSIS — R06.02 SHORTNESS OF BREATH: ICD-10-CM

## 2020-01-06 PROCEDURE — G0439 PPPS, SUBSEQ VISIT: HCPCS | Performed by: INTERNAL MEDICINE

## 2020-01-06 RX ORDER — PANTOPRAZOLE SODIUM 20 MG/1
20 TABLET, DELAYED RELEASE ORAL DAILY
Qty: 30 TAB | Refills: 3 | Status: SHIPPED | OUTPATIENT
Start: 2020-01-06 | End: 2020-02-27 | Stop reason: SDUPTHER

## 2020-01-06 ASSESSMENT — ENCOUNTER SYMPTOMS: GENERAL WELL-BEING: GOOD

## 2020-01-06 ASSESSMENT — PATIENT HEALTH QUESTIONNAIRE - PHQ9: CLINICAL INTERPRETATION OF PHQ2 SCORE: 0

## 2020-01-06 ASSESSMENT — ACTIVITIES OF DAILY LIVING (ADL): BATHING_REQUIRES_ASSISTANCE: 0

## 2020-01-06 NOTE — LETTER
[email or web address]  Asael Redd M.D.  [address]  [place] [address]  Fax: [phone]   Authorization for Release/Disclosure of   Protected Health Information   Name: LEONILA MAYS : 1945 SSN: [id number]   Address: [facility] [place] [address] Phone:    [phone] (home)    I authorize the entity listed below to release/disclose the PHI below to:   [facility]/Asael Redd M.D. and Asael Redd M.D.   Provider or Entity Name:                                                         Dr Spann ([facility])   Address   City, State, [facility]   Phone:      Fax:     Reason for request: continuity of care   Information to be released: past 1 yr of records   [  ] LAST COLONOSCOPY,  including any PATH REPORT and follow-up  [  ] LAST FIT/COLOGUARD RESULT [  ] LAST DEXA  [  ] LAST MAMMOGRAM  [  ] LAST PAP  [  ] LAST LABS [  ] RETINA EXAM REPORT  [  ] IMMUNIZATION RECORDS  [ xx ] Release all info      [  ] Check here and initial the line next to each item to release ALL health information INCLUDING  _____ Care and treatment for drug and / or alcohol abuse  _____ HIV testing, infection status, or AIDS  _____ Genetic Testing    DATES OF SERVICE OR TIME PERIOD TO BE DISCLOSED: _____________  I understand and acknowledge that:  * This Authorization may be revoked at any time by you in writing, except if your health information has already been used or disclosed.  * Your health information that will be used or disclosed as a result of you signing this authorization could be re-disclosed by the recipient. If this occurs, your re-disclosed health information may no longer be protected by State or Federal laws.  * You may refuse to sign this Authorization. Your refusal will not affect your ability to obtain treatment.  * This Authorization becomes effective upon signing and will  on (date) __________.      If no date is indicated, this Authorization will  one (1) year from the signature date.     Name: Ping Das    Signature:   Date:     1/7/2020       PLEASE FAX REQUESTED RECORDS BACK TO: [phone]

## 2020-01-06 NOTE — PROGRESS NOTES
Subjective:      Ping Das is a 74 y.o. female who presents medicare wellness          HPI   CC:  Health Risk Assessment Exam.    HPI:wellness assessment  Ping is a 74 y.o. here for Health Risk Assessment.   Medication, allergies, medical history, surgical history, social history reviewed and updated  PCP: Asael Redd M.D.  Derm:  GI:[facility]  Ophthal:    Gyn dr.wellhoner [facility]  meds and allergies reviewed     Most recent labs reviewed  12/30/19 tsh 0.6,free t4 1.02    Patient Active Problem List    Diagnosis Date Noted   • Impaired glucose metabolism 10/19/2018   • Microscopic hematuria 11/03/2017   • HSV infection 05/10/2016   • History cough 03/07/2016   • History of compression fracture of spine 03/07/2016   • Ocular migraine 01/24/2013   • Osteopenia 07/06/2011   • History of thyroid cancer 05/15/2009   • Dyslipidemia 05/15/2009   • History of depression 05/15/2009   • Tubular adenoma of colon 05/15/2009   • Preventative health care 05/15/2009     Current Outpatient Medications   Medication Sig Dispense Refill   • potassium chloride ER (KLOR-CON) 10 MEQ tablet Take 1 Tab by mouth every day. 90 Tab 0   • atorvastatin (LIPITOR) 20 MG Tab Take 1 Tab by mouth every day. 90 Tab 0   • fluticasone (FLONASE) 50 MCG/ACT nasal spray Spray 2 Sprays in nose every day. EACH NOSTRIL 48 g 3   • naproxen (NAPROSYN) 500 MG Tab Take 1 Tab by mouth 2 times a day as needed (pain). 60 Tab 5   • Multiple Vitamin (MULTI-DAY PO) Take  by mouth.     • Calcium Carbonate-Vitamin D (CALCIUM 600/VITAMIN D PO) Take  by mouth.     • cyanocobalamin (VITAMIN B-12) 500 MCG Tab Take 500 mcg by mouth every day.     • Cholecalciferol (VITAMIN D3 PO) Take  by mouth.     • Glucosamine HCl (GLUCOSAMINE PO) Take  by mouth.     • Omega-3 Fatty Acids (OMEGA 3 PO) Take  by mouth.     • Cetirizine HCl (ZYRTEC ALLERGY PO) Take  by mouth.     • levothyroxine (SYNTHROID) 100 MCG TABS Take 100 mcg by mouth every day.  Takes 1 1/2 tabs per day      • cyclobenzaprine (FLEXERIL) 5 MG tablet Take 1-2 Tabs by mouth 3 times a day as needed. (Patient not taking: Reported on 1/6/2020) 30 Tab 0   • amoxicillin-clavulanate (AUGMENTIN) 875-125 MG Tab Take 1 Tab by mouth 2 times a day. (Patient not taking: Reported on 1/6/2020) 20 Quantity Sufficient 1   • omeprazole (PRILOSEC) 40 MG delayed-release capsule Take 1 Cap by mouth every day. (Patient not taking: Reported on 1/6/2020) 30 Cap 5   • Loratadine (CLARITIN PO) Take  by mouth.     • acyclovir (ZOVIRAX) 400 MG tablet Take 400 mg by mouth 2 times a day.       No current facility-administered medications for this visit.       Current supplements: reviewed  Chronic narcotic pain medicines:no  Allergies: Kenalog    Screening: reviewed  Depressive Symptoms: Denies feeling down, depressed or hopeless. Denies loss of interest or pleasure in doing things. Mood better during the winter  ADLs: Denies needing help with using telephone, transportation, shopping, preparing meals, housework, laundry, or managing medication or money.   Independent with bathing, hygiene, feeding, toileting, dressing  Memory concerns: Denies difficulty remembering details of conversations, events and upcoming appointments.  Hearing problems: Denies.   Recent falls no  Current social contact/activities:  reviewed  Social History     Tobacco Use   • Smoking status: Never Smoker   • Smokeless tobacco: Never Used   Substance Use Topics   • Alcohol use: Yes     Alcohol/week: 0.6 oz     Types: 1 Glasses of wine per week     Comment: occasional   • Drug use: No       Family History   Problem Relation Age of Onset   • Cancer Mother    • Heart Disease Father    • Heart Disease Sister    • Cancer Daughter      Past Surgical History:   Procedure Laterality Date   • VITRECTOMY POSTERIOR  11/17/2011    Performed by TRACEY RAMON at SURGERY SAME [facility] ORS   • OTHER  August 2007    thyroid   • OTHER  Nov 2006     "cataract   • OTHER  July 2006    Skin /face          Ostomy or other tubes or amputations: no  Chronic oxygen use no         /76 (BP Location: Right arm, Patient Position: Sitting, BP Cuff Size: Adult)   Pulse 72   Temp 36.1 °C (96.9 °F)   Ht 1.626 m (5' 4\")   Wt 70.1 kg (154 lb 9.6 oz)   SpO2 97%   BMI 26.54 kg/m²  Body mass index is 26.54 kg/m².       Gait: normal. Uses assistive device no       Health Care Screening recommendations reviewed with patient today and updated or ordered.  DPA/Advanced directive: Completed/Information provided.    Referrals for PT/OT/Nutrition counseling/Behavioral Health/Smoking cessation as above if indicated  Discussion today about general wellness and lifestyle habits:    · Prevent falls and reduce trip hazards;   · Have a working fire alarm and carbon monoxide detector;   · Engage in regular physical activity and social activities;   · Use sun protection when outdoors.          Current Outpatient Medications   Medication Sig Dispense Refill   • potassium chloride ER (KLOR-CON) 10 MEQ tablet Take 1 Tab by mouth every day. 90 Tab 0   • atorvastatin (LIPITOR) 20 MG Tab Take 1 Tab by mouth every day. 90 Tab 0   • fluticasone (FLONASE) 50 MCG/ACT nasal spray Spray 2 Sprays in nose every day. EACH NOSTRIL 48 g 3   • naproxen (NAPROSYN) 500 MG Tab Take 1 Tab by mouth 2 times a day as needed (pain). 60 Tab 5   • Multiple Vitamin (MULTI-DAY PO) Take  by mouth.     • Calcium Carbonate-Vitamin D (CALCIUM 600/VITAMIN D PO) Take  by mouth.     • cyanocobalamin (VITAMIN B-12) 500 MCG Tab Take 500 mcg by mouth every day.     • Cholecalciferol (VITAMIN D3 PO) Take  by mouth.     • Glucosamine HCl (GLUCOSAMINE PO) Take  by mouth.     • Omega-3 Fatty Acids (OMEGA 3 PO) Take  by mouth.     • Cetirizine HCl (ZYRTEC ALLERGY PO) Take  by mouth.     • levothyroxine (SYNTHROID) 100 MCG TABS Take 100 mcg by mouth every day. Takes 1 1/2 tabs per day      • cyclobenzaprine (FLEXERIL) 5 MG tablet " Take 1-2 Tabs by mouth 3 times a day as needed. (Patient not taking: Reported on 1/6/2020) 30 Tab 0   • amoxicillin-clavulanate (AUGMENTIN) 875-125 MG Tab Take 1 Tab by mouth 2 times a day. (Patient not taking: Reported on 1/6/2020) 20 Quantity Sufficient 1   • omeprazole (PRILOSEC) 40 MG delayed-release capsule Take 1 Cap by mouth every day. (Patient not taking: Reported on 1/6/2020) 30 Cap 5   • Loratadine (CLARITIN PO) Take  by mouth.     • acyclovir (ZOVIRAX) 400 MG tablet Take 400 mg by mouth 2 times a day.       No current facility-administered medications for this visit.              Tubular Adenoma  2004 no records path unknown  4/30/12 [facility] colon tubular adenoma, repeat 5 yrs  10/20/17 colon per [facility] 5 polyps pathology 4 polyps adenomatous and benign polypoid tissue, severe diverticulosis, recommend repeat colonoscopy 3 years     Thoracic compression fracture  11/10/04 thoracic x-ray negative  3/4/14 cxr mid apex right lateral curvature thoracic spine may be positional or from mild scoliosis  3/7/16 cxr mid and upper thoracic spine compression fracture     Preventative health  1/19/12 pneumovax  1/19/12 tdap  3/5/14 zoster vaccine  2/5/15 prevnar  6/12/15 dexa LS -0.6,hip -1.3;FRAX 10.5% major,hip 1.7%  9/5/17 vit d 42  10/20/17 colon per [facility] 5 polyps pathology 4 polyps adenomatous and benign polypoid tissue, severe diverticulosis, recommend repeat colonoscopy 3 years  9/12/18 mammogram heterogeneously dense breast tissue offered sonocine  11/14/18 sonocine negative  11/27/18 pap per gyn      Osteopenia  10/08 dexa LS -1.0, hip -0.4  7/11 dexa LS -0.5,hip -1.2  2/13 vit d 50  3/15/14 vit d 31  3/25/15 vit d 33  6/12/15 dexa LS -0.6,hip -1.3;FRAX 10.5% major,hip 1.7%  3/7/16 cxr mid and upper thoracic spine compression fracture  5/17/16 vit d 29 take extra 2000 units daily  9/5/17 dexa LS-0.7,hip-1.2  9/5/17 vit d 42     Ocular migraine     Microscopic hematuria  9/5/17 UA 2-5 RBC  9/7/17 urine  culture negative, repeat UA 2 weeks, order in system  9/27/17 UA 2-5 RBC, will order us renal  10/4/17 ultrasound renal negative repeat urinalysis 2 weeks, if still positive will check CT urogram and urology evaluation  10/31/17 UA no RBC urine culture negative    Impaired glucose metabolism  10/19/18 A1c 6.2%     HSV acyclovir as needed     History thyroid cancer  8/07 s/p thyroidectomy 1.1 cm no evid of adenopathy sees 8/07 s/p thyroidectomy 1.1 cm no evid of adenopathy sees   3/11 dr.abbott saunders note u/s thyroid in june, on levoxyl 100 mg  6/11 tsh 0.33, on levoxyl 100 mcg  1/12 tsh 3.9 on levoxyl 100 mcg followed by   8/12 dr.abbott saunders note, tsh 0.05,free t4 1.46, thyroglobulin <0.9, decrease levothyroxine to 100 mcg. Repeat thyroid ultrasound February 1/13 tsh 0.1, thyroglobulin less than 0.1; dr.abbott saunders note no changes  1/29/14 tsh 0.17,free t4 1.0,thyroglobulin <0.1 on levothyroxine 100 mcg  2/25/14 tsh 0.12, free t4, free t4 1.2,thyroglobulin 0.1 on levothyroxine 100 mcg  9/5/14 tsh 0.13,free t4 1.1 on levothyroxine 100 mcg one pill six days per week ,1/2 pill on day 7  9/20/14  endocrine note  1/6/15 tsh 1.5,free t4 0.99 on levothyroxine 100 mcg 6 days per week and 75 mcg one day per week  1/13/15  endocrine note  3/24/15 tsh 0.3,free t4 1.5 on levothyroxine 100 mcg 6 days per week  4/2/15  endocrine note continue same dose levothyroxine and follow up 6 months  9/30/15 tsh 0.09,free t4 1.3, thyroglobulin<0.1, antithyroglobulin antibody less<0.9; on synthroid 100 mcg 6 days a week, synthroid 75 mcg one day per week  10/6/15  endocrinology note decrease total thyroid dose by 25 mcg during the week  5/17/16 tsh 0.6,free t4 0.9 on synthroid 100 mcg 6 days per week and 50 mcg 1 day per week? Per  endocrinology  5/15/17 tsh 1.1,free t4 1.1 on levothyroxine 100 mcg six days per week  5/18/17  endocrine note change to  levothyroxine 75 mcg daily   11/27/17 tsh 0.15,free t4 1.26, thyroglbulin< 0.1, antithyroglobulin < 0.9  followed by  endocrinology  1/19/18 tsh 0.9 on levothyroxine per  endocrinology  4/17/18 tsh 0.7, free t4 1.2 on levothyroxine per  endocrinology  10/18/18 tsh 0.3,free t4 1.2,on levothyroxine 100 mcg six days and 1/2 tab day 7  10/24/18  endocrine note change levothyroxine to half a tablet twice a week, full tablet 5 days a week, recheck labs after  1/23/19 tsh 1.6,free t4 0.98, thyroglobulin less than 0.1, antithyroglobulin less than 0.9 per endocrinology  6/21/19 tsh 0.37, free t4 1.13 on levothyroxine 100 mcg  half a tablet twice a week, full tablet 5 days a week per  endocrinology  10/16/19 tsh 0.4, free t4 1.3 per endocrine   12/30/19 tsh 0.6,free t4 1.02 on levothyroxine 88 mcg six days per week and 1/2 tab one day per week per endocrine     History depression  Since 2001, having failed celexa and effexor?, lexapro working well  4/10 samples cymbalta  6/11 off cymbalta  7/24/14 trial wellbutrin 150 mg, PHQ-9 17  8/19/14 behavioral health note; pt will follow up with psychotherapy  9/22/14 increase wellbutrin to 300 mgdepression  10/21/14 PHQ 14  10/21/14 decrease wellbutrin to 150 mg and add effexor XR 75 mg qday  1/25/15 off wellbutrin    history cough  10/11/18 cough chronic in nature, question allergic rhinitis although treatment ineffective for preventing cough, tried prilosec over-the-counter no benefit  10/11/18 cxr negative  11/8/18 PFT FEV1 1.8 (87% predicted), FVC 2.5 (91% predicted), FEV/FVC 74%, no bronchodilator response, DLCO 120% predicted, normal pulmonary function testing      Dyslipidemia  2/09 chol 204,trig 130, hdl 62, ldl 116  5/10 chol 142,trig 63,hdl 64,ldl 65  6/11 chol 120,trig 65,hdl 68,ldl 39 on mevacor and niaspan  1/12 chol 98,trig 55,hdl 40,ldl 47 on mevacor and niaspan; ok to stop niaspan  2/13 chol 172,trig 314,hdl 45,ldl 64 on  mevacor 40 mg; start fish oil 2 bid   3/15/14 chol 198,trig 101,hdl 54,ldl 124 on mevacor 40 mg and fish oil  7/29/14 dietary evaluation and education  3/25/15 chol 175,trig 107,hdl 54,ldl 100 on mevacor 40 mg  5/17/16 chol 196,trig 105,hdl 52,ldl 123 on mevacor 40 mg  9/5/17 chol 206,trig 139,hdl 48,ldl 130 on mevacor 40 mg  9/8/17 change to lipitor 20 mg and repeat labs in 4 weeks  11/1/17 chol 179,trig 168,hdl 44,ldl 101 on lipitor 20 mg  10/19/18 chol 188,trig 170,hdl 47,ldl 107 on lipitor 20 mg       Tubular Adenoma  2004 no records path unknown  4/30/12 [facility] colon tubular adenoma, repeat 5 yrs  10/20/17 colon per [facility] 5 polyps pathology 4 polyps adenomatous and benign polypoid tissue, severe diverticulosis, recommend repeat colonoscopy 3 years     Thoracic compression fracture  11/10/04 thoracic x-ray negative  3/4/14 cxr mid apex right lateral curvature thoracic spine may be positional or from mild scoliosis  3/7/16 cxr mid and upper thoracic spine compression fracture     Preventative health  1/19/12 pneumovax  1/19/12 tdap  3/5/14 zoster vaccine  2/5/15 prevnar  6/12/15 dexa LS -0.6,hip -1.3;FRAX 10.5% major,hip 1.7%  9/5/17 vit d 42  10/20/17 colon per [facility] 5 polyps pathology 4 polyps adenomatous and benign polypoid tissue, severe diverticulosis, recommend repeat colonoscopy 3 years  9/12/18 mammogram heterogeneously dense breast tissue offered sonocine  10/11/18 shingles prescription to get at pharmacy  11/14/18 sonocine negative  11/27/18 pap per gyn      Osteopenia  10/08 dexa LS -1.0, hip -0.4  7/11 dexa LS -0.5,hip -1.2  2/13 vit d 50  3/15/14 vit d 31  3/25/15 vit d 33  6/12/15 dexa LS -0.6,hip -1.3;FRAX 10.5% major,hip 1.7%  3/7/16 cxr mid and upper thoracic spine compression fracture  5/17/16 vit d 29 take extra 2000 units daily  9/5/17 dexa LS-0.7,hip-1.2  9/5/17 vit d 42     Ocular migraine     Microscopic hematuria  9/5/17 UA 2-5 RBC  9/7/17 urine culture negative, repeat UA 2 weeks,  order in system  9/27/17 UA 2-5 RBC, will order us renal  10/4/17 ultrasound renal negative repeat urinalysis 2 weeks, if still positive will check CT urogram and urology evaluation  10/31/17 UA no RBC urine culture negative    Impaired glucose metabolism  10/19/18 A1c 6.2%     HSV acyclovir as needed     History thyroid cancer  8/07 s/p thyroidectomy 1.1 cm no evid of adenopathy sees   3/11 dr.abbott saunders note u/s thyroid in june, on levoxyl 100 mg  6/11 tsh 0.33, on levoxyl 100 mcg  1/12 tsh 3.9 on levoxyl 100 mcg followed by   8/12 dr.abbott saunders note, tsh 0.05,free t4 1.46, thyroglobulin <0.9, decrease levothyroxine to 100 mcg. Repeat thyroid ultrasound February 1/13 tsh 0.1, thyroglobulin less than 0.1; dr.abbott saunders note no changes  1/29/14 tsh 0.17,free t4 1.0,thyroglobulin <0.1 on levothyroxine 100 mcg  2/25/14 tsh 0.12, free t4, free t4 1.2,thyroglobulin 0.1 on levothyroxine 100 mcg  9/5/14 tsh 0.13,free t4 1.1 on levothyroxine 100 mcg one pill six days per week ,1/2 pill on day 7  9/20/14  endocrine note  1/6/15 tsh 1.5,free t4 0.99 on levothyroxine 100 mcg 6 days per week and 75 mcg one day per week  1/13/15  endocrine note  3/24/15 tsh 0.3,free t4 1.5 on levothyroxine 100 mcg 6 days per week  4/2/15  endocrine note continue same dose levothyroxine and follow up 6 months  9/30/15 tsh 0.09,free t4 1.3, thyroglobulin<0.1, antithyroglobulin antibody less<0.9; on synthroid 100 mcg 6 days a week, synthroid 75 mcg one day per week  10/6/15  endocrinology note decrease total thyroid dose by 25 mcg during the week  5/17/16 tsh 0.6,free t4 0.9 on synthroid 100 mcg 6 days per week and 50 mcg 1 day per week? Per  endocrinology  5/15/17 tsh 1.1,free t4 1.1 on levothyroxine 100 mcg six days per week  5/18/17  endocrine note change to levothyroxine 75 mcg daily   11/27/17 tsh 0.15,free t4 1.26, thyroglbulin< 0.1, antithyroglobulin < 0.9  followed  by  endocrinology  1/19/18 tsh 0.9 on levothyroxine per  endocrinology  4/17/18 tsh 0.7, free t4 1.2 on levothyroxine per  endocrinology  10/18/18 tsh 0.3,free t4 1.2,on levothyroxine 100 mcg six days and 1/2 tab day 7  10/24/18  endocrine note change levothyroxine to half a tablet twice a week, full tablet 5 days a week, recheck labs after  1/23/19 tsh 1.6,free t4 0.98, thyroglobulin less than 0.1, antithyroglobulin less than 0.9 per endocrinology     History depression  Since 2001, having failed celexa and effexor?, lexapro working well  4/10 samples cymbalta  6/11 off cymbalta  7/24/14 trial wellbutrin 150 mg, PHQ-9 17  8/19/14 behavioral health note; pt will follow up with psychotherapy  9/22/14 increase wellbutrin to 300 mgdepression  10/21/14 PHQ 14  10/21/14 decrease wellbutrin to 150 mg and add effexor XR 75 mg qday  1/25/15 off wellbutrin    History compression fracture  11/10/04 thoracic x-ray negative  3/4/14 cxr mid apex right lateral curvature thoracic spine may be positional or from mild scoliosis  3/7/16 cxr mid and upper thoracic spine compression fracture  9/5/17 dexa LS-0.7,hip-1.2     Dyslipidemia  2/09 chol 204,trig 130, hdl 62, ldl 116  5/10 chol 142,trig 63,hdl 64,ldl 65  6/11 chol 120,trig 65,hdl 68,ldl 39 on mevacor and niaspan  1/12 chol 98,trig 55,hdl 40,ldl 47 on mevacor and niaspan; ok to stop niaspan  2/13 chol 172,trig 314,hdl 45,ldl 64 on mevacor 40 mg; start fish oil 2 bid   3/15/14 chol 198,trig 101,hdl 54,ldl 124 on mevacor 40 mg and fish oil  7/29/14 dietary evaluation and education  3/25/15 chol 175,trig 107,hdl 54,ldl 100 on mevacor 40 mg  5/17/16 chol 196,trig 105,hdl 52,ldl 123 on mevacor 40 mg  9/5/17 chol 206,trig 139,hdl 48,ldl 130 on mevacor 40 mg  9/8/17 change to lipitor 20 mg and repeat labs in 4 weeks  11/1/17 chol 179,trig 168,hdl 44,ldl 101 on lipitor 20 mg  10/19/18 chol 188,trig 170,hdl 47,ldl 107 on lipitor 20 mg      cough  10/11/18 cough chronic in nature, question allergic rhinitis although treatment ineffective for preventing cough, tried prilosec over-the-counter no benefit  10/11/18 cxr negative  11/8/18 PFT FEV1 1.8 (87% predicted), FVC 2.5 (91% predicted), FEV/FVC 74%, no bronchodilator response, DLCO 120% predicted, normal pulmonary function testing                  Patient Active Problem List   Diagnosis   • History of thyroid cancer   • Dyslipidemia   • History of depression   • Tubular adenoma of colon   • Preventative health care   • Osteopenia   • Ocular migraine   • History cough   • History of compression fracture of spine   • HSV infection   • Microscopic hematuria   • Impaired glucose metabolism     Depression Screening    Little interest or pleasure in doing things?  0 - not at all  Feeling down, depressed , or hopeless? 0 - not at all  Patient Health Questionnaire Score: 0     If depressive symptoms identified deferred to follow up visit unless specifically addressed in assessment and plan.    Interpretation of PHQ-9 Total Score   Score Severity   1-4 No Depression   5-9 Mild Depression   10-14 Moderate Depression   15-19 Moderately Severe Depression   20-27 Severe Depression    Screening for Cognitive Impairment    Three Minute Recall (village, kitchen, baby) 3/3    Augusto clock face with all 12 numbers and set the hands to show 10 past 10.  Yes    Cognitive concerns identified deferred for follow up unless specifically addressed in assessment and plan.    Fall Risk Assessment    Has the patient had two or more falls in the last year or any fall with injury in the last year?  No    Safety Assessment    Throw rugs on floor.  Yes  Handrails on all stairs.  No  Good lighting in all hallways.  Yes  Difficulty hearing.  No  Patient counseled about all safety risks that were identified.    Functional Assessment ADLs    Are there any barriers preventing you from cooking for yourself or meeting nutritional needs?   No.    Are there any barriers preventing you from driving safely or obtaining transportation?  No.    Are there any barriers preventing you from using a telephone or calling for help?  No.    Are there any barriers preventing you from shopping?  No.    Are there any barriers preventing you from taking care of your own finances?  No.    Are there any barriers preventing you from managing your medications?  No.    Are there any barriers preventing you from showering, bathing or dressing yourself?  No.    Are you currently engaging in any exercise or physical activity?  No.     What is your perception of your health?  Good.      Health Maintenance Summary                HEPATITIS C SCREENING Overdue 1945     MAMMOGRAM Overdue 9/11/2019      Done 9/11/2018 MA-SCREENING MAMMO BILAT W/TOMOSYNTHESIS W/CAD     Patient has more history with this topic...    Annual Wellness Visit Overdue 10/12/2019      Done 10/11/2018 Visit Dx: Medicare annual wellness visit, subsequent     Patient has more history with this topic...    IMM ZOSTER VACCINES Next Due 1/8/2020      Done 11/13/2019 Ext Imm: SHINGRIX 50 MCG0.5ML INJ GLAX     Patient has more history with this topic...    IMM DTaP/Tdap/Td Vaccine Next Due 1/19/2022      Done 1/19/2012 Imm Admin: Tdap Vaccine    BONE DENSITY Next Due 9/5/2022      Done 9/5/2017 DS-BONE DENSITY STUDY (DEXA)     Patient has more history with this topic...    COLONOSCOPY Next Due 10/20/2022      Done 10/20/2017 REFERRAL TO GI FOR COLONOSCOPY          Patient Care Team:  Asael Redd M.D. as PCP - General  Jeimy Huntley M.D. as Consulting Physician (Gynecology)  Carolyn Ventura M.D. as Consulting Physician (Endocrinology)  Mejia Saez O.D. as Consulting Physician (Optometry)  Charu Spann M.D. as Consulting Physician (Dermatology)  Jose Alejandro Godinez M.D. as Consulting Physician (Ophthalmology)  Viraj Rodriguez M.D. as Consulting Physician (Ophthalmology)  Fred Fuentes,  "M.D. as Consulting Physician (Ophthalmology)      ROS    Chronic cough, tried Flonase, Prilosec in the past, not related to activity, has had negative pulmonary function testing, no tobacco, cough can occur at all times whether active, different times of the day, occasional postnasal drip, no history of asthma, no reflux symptoms     Objective:     /76 (BP Location: Right arm, Patient Position: Sitting, BP Cuff Size: Adult)   Pulse 72   Temp 36.1 °C (96.9 °F)   Ht 1.626 m (5' 4\")   Wt 70.1 kg (154 lb 9.6 oz)   SpO2 97%   BMI 26.54 kg/m²      Physical Exam  Vitals signs and nursing note reviewed.   Constitutional:       Appearance: Normal appearance.   HENT:      Head: Normocephalic and atraumatic.      Right Ear: Tympanic membrane normal.      Left Ear: Tympanic membrane normal.      Nose: No congestion.   Eyes:      General:         Right eye: No discharge.         Left eye: No discharge.   Neck:      Musculoskeletal: Neck supple.   Cardiovascular:      Rate and Rhythm: Normal rate and regular rhythm.   Pulmonary:      Effort: Pulmonary effort is normal. No respiratory distress.   Abdominal:      General: There is no distension.   Skin:     General: Skin is warm.   Neurological:      Mental Status: She is alert.   Psychiatric:         Mood and Affect: Mood normal.         Behavior: Behavior normal.                 Assessment/Plan:       Assessment  #1 wellness visit     #2 elevated blood pressure     #3 history thyroid cancer s/p thyroidectomy 12/30/19 tsh 0.6,free t4 1.02 on levothyroxine 88 mcg six days per week and 1/2 tab one day per week per endocrine     #4 dyslipidemia 10/19/18 chol 188,trig 170,hdl 47,ldl 107 on lipitor 20 mg    #5 impaired glucose metabolism 10/19/18 A1c 6.2%       #6 cough has had cxr negative, has had flonase no benefit, tried prilosec, has had PFT negative    #7 low vitamin D    #8 tubular adenoma    #9 preventative health  1/19/12 pneumovax  1/19/12 tdap  3/5/14 zoster " vaccine  2/5/15 prevnar  9/5/17 vit d 42  9/5/17 dexa LS-0.7,hip-1.2  10/20/17 colon per [facility] 5 polyps pathology 4 polyps adenomatous and benign polypoid tissue, severe diverticulosis, recommend repeat colonoscopy 3 years  9/12/18 mammogram heterogeneously dense breast tissue offered sonocine  11/14/18 sonocine negative  11/27/18 pap per gyn   11/13/19 shingrix first at [facility], [place], operated by [facility]     #10 elevated blood pressure today repeat done by myself 138/74 no previous history of hypertension    Plan  #1 had flu shot at [facility] and first shingrix at [facility], [place], operated by [facility]     #2 due for second shingrix after 1/24/20 she will check with her pharmacy    #3 old records  dermatology continue sunscreen    #4 check blood pressure on cuff at home and record blood pressure    #5 mammogram and dexa ordered    #6 due for colon in october [facility]    #7 labs ordered    #8 hep c screening     #9 declines hearing test    #10 declines physical therapy    #11 start walking exercising daily as exercise is important for blood pressure reduction    #12 allergy referral for persistent cough, trial of pantoprazole since she also already tried Prilosec, take 30 minutes before meals, medications, supplements and separate from thyroid medication    #13 follow-up endocrinology    #14 follow-up gynecology    #15 CT high-resolution thorax for chronic cough evaluate other causes of shortness of breath

## 2020-02-26 ENCOUNTER — TELEPHONE (OUTPATIENT)
Dept: MEDICAL GROUP | Facility: MEDICAL CENTER | Age: 75
End: 2020-02-26

## 2020-02-26 DIAGNOSIS — E78.5 DYSLIPIDEMIA: ICD-10-CM

## 2020-02-26 RX ORDER — ATORVASTATIN CALCIUM 20 MG/1
20 TABLET, FILM COATED ORAL DAILY
Qty: 30 TAB | Refills: 1 | Status: SHIPPED | OUTPATIENT
Start: 2020-02-26 | End: 2020-02-27 | Stop reason: SDUPTHER

## 2020-02-27 DIAGNOSIS — K21.9 GASTROESOPHAGEAL REFLUX DISEASE WITHOUT ESOPHAGITIS: ICD-10-CM

## 2020-02-27 DIAGNOSIS — E87.6 LOW BLOOD POTASSIUM: ICD-10-CM

## 2020-02-27 DIAGNOSIS — E78.5 DYSLIPIDEMIA: ICD-10-CM

## 2020-02-27 RX ORDER — NAPROXEN 500 MG/1
500 TABLET ORAL 2 TIMES DAILY PRN
Qty: 60 TAB | Refills: 2 | Status: SHIPPED | OUTPATIENT
Start: 2020-02-27 | End: 2020-07-09 | Stop reason: SDUPTHER

## 2020-02-27 RX ORDER — PANTOPRAZOLE SODIUM 20 MG/1
20 TABLET, DELAYED RELEASE ORAL DAILY
Qty: 30 TAB | Refills: 2 | Status: SHIPPED | OUTPATIENT
Start: 2020-02-27 | End: 2020-07-09

## 2020-02-27 RX ORDER — ATORVASTATIN CALCIUM 20 MG/1
20 TABLET, FILM COATED ORAL DAILY
Qty: 30 TAB | Refills: 2 | Status: SHIPPED | OUTPATIENT
Start: 2020-02-27 | End: 2020-05-22

## 2020-02-27 RX ORDER — POTASSIUM CHLORIDE 750 MG/1
10 TABLET, FILM COATED, EXTENDED RELEASE ORAL DAILY
Qty: 30 TAB | Refills: 2 | Status: SHIPPED | OUTPATIENT
Start: 2020-02-27 | End: 2020-05-22

## 2020-05-22 ENCOUNTER — TELEPHONE (OUTPATIENT)
Dept: MEDICAL GROUP | Facility: MEDICAL CENTER | Age: 75
End: 2020-05-22

## 2020-05-22 DIAGNOSIS — E87.6 LOW BLOOD POTASSIUM: ICD-10-CM

## 2020-05-22 DIAGNOSIS — E78.5 DYSLIPIDEMIA: ICD-10-CM

## 2020-05-22 RX ORDER — ATORVASTATIN CALCIUM 20 MG/1
TABLET, FILM COATED ORAL
Qty: 30 TAB | Refills: 1 | Status: SHIPPED | OUTPATIENT
Start: 2020-05-22 | End: 2020-07-09 | Stop reason: SDUPTHER

## 2020-05-22 RX ORDER — POTASSIUM CHLORIDE 750 MG/1
TABLET, EXTENDED RELEASE ORAL
Qty: 30 TAB | Refills: 1 | Status: SHIPPED | OUTPATIENT
Start: 2020-05-22 | End: 2020-07-09 | Stop reason: SDUPTHER

## 2020-06-11 ENCOUNTER — HOSPITAL ENCOUNTER (OUTPATIENT)
Dept: LAB | Facility: MEDICAL CENTER | Age: 75
End: 2020-06-11
Attending: INTERNAL MEDICINE
Payer: MEDICARE

## 2020-06-11 DIAGNOSIS — E55.9 HYPOVITAMINOSIS D: ICD-10-CM

## 2020-06-11 DIAGNOSIS — Z11.59 NEED FOR HEPATITIS C SCREENING TEST: ICD-10-CM

## 2020-06-11 DIAGNOSIS — R73.09 IMPAIRED GLUCOSE METABOLISM: ICD-10-CM

## 2020-06-11 DIAGNOSIS — E78.5 DYSLIPIDEMIA: ICD-10-CM

## 2020-06-11 LAB
25(OH)D3 SERPL-MCNC: 44 NG/ML (ref 30–100)
ALBUMIN SERPL BCP-MCNC: 4.4 G/DL (ref 3.2–4.9)
ALBUMIN/GLOB SERPL: 1.8 G/DL
ALP SERPL-CCNC: 95 U/L (ref 30–99)
ALT SERPL-CCNC: 18 U/L (ref 2–50)
ANION GAP SERPL CALC-SCNC: 12 MMOL/L (ref 7–16)
AST SERPL-CCNC: 19 U/L (ref 12–45)
BASOPHILS # BLD AUTO: 0.8 % (ref 0–1.8)
BASOPHILS # BLD: 0.05 K/UL (ref 0–0.12)
BILIRUB SERPL-MCNC: 0.6 MG/DL (ref 0.1–1.5)
BUN SERPL-MCNC: 18 MG/DL (ref 8–22)
CALCIUM SERPL-MCNC: 9.5 MG/DL (ref 8.5–10.5)
CHLORIDE SERPL-SCNC: 103 MMOL/L (ref 96–112)
CHOLEST SERPL-MCNC: 181 MG/DL (ref 100–199)
CO2 SERPL-SCNC: 25 MMOL/L (ref 20–33)
CREAT SERPL-MCNC: 0.53 MG/DL (ref 0.5–1.4)
EOSINOPHIL # BLD AUTO: 0.12 K/UL (ref 0–0.51)
EOSINOPHIL NFR BLD: 1.9 % (ref 0–6.9)
ERYTHROCYTE [DISTWIDTH] IN BLOOD BY AUTOMATED COUNT: 43.8 FL (ref 35.9–50)
EST. AVERAGE GLUCOSE BLD GHB EST-MCNC: 120 MG/DL
FASTING STATUS PATIENT QL REPORTED: NORMAL
GLOBULIN SER CALC-MCNC: 2.5 G/DL (ref 1.9–3.5)
GLUCOSE SERPL-MCNC: 111 MG/DL (ref 65–99)
HBA1C MFR BLD: 5.8 % (ref 0–5.6)
HCT VFR BLD AUTO: 43.6 % (ref 37–47)
HCV AB SER QL: NORMAL
HDLC SERPL-MCNC: 49 MG/DL
HGB BLD-MCNC: 14.6 G/DL (ref 12–16)
IMM GRANULOCYTES # BLD AUTO: 0.02 K/UL (ref 0–0.11)
IMM GRANULOCYTES NFR BLD AUTO: 0.3 % (ref 0–0.9)
LDLC SERPL CALC-MCNC: 105 MG/DL
LYMPHOCYTES # BLD AUTO: 2 K/UL (ref 1–4.8)
LYMPHOCYTES NFR BLD: 31.7 % (ref 22–41)
MCH RBC QN AUTO: 30.9 PG (ref 27–33)
MCHC RBC AUTO-ENTMCNC: 33.5 G/DL (ref 33.6–35)
MCV RBC AUTO: 92.2 FL (ref 81.4–97.8)
MONOCYTES # BLD AUTO: 0.49 K/UL (ref 0–0.85)
MONOCYTES NFR BLD AUTO: 7.8 % (ref 0–13.4)
NEUTROPHILS # BLD AUTO: 3.62 K/UL (ref 2–7.15)
NEUTROPHILS NFR BLD: 57.5 % (ref 44–72)
NRBC # BLD AUTO: 0 K/UL
NRBC BLD-RTO: 0 /100 WBC
PLATELET # BLD AUTO: 286 K/UL (ref 164–446)
PMV BLD AUTO: 9.8 FL (ref 9–12.9)
POTASSIUM SERPL-SCNC: 3.8 MMOL/L (ref 3.6–5.5)
PROT SERPL-MCNC: 6.9 G/DL (ref 6–8.2)
RBC # BLD AUTO: 4.73 M/UL (ref 4.2–5.4)
SODIUM SERPL-SCNC: 140 MMOL/L (ref 135–145)
T4 FREE SERPL-MCNC: 1.37 NG/DL (ref 0.93–1.7)
TRIGL SERPL-MCNC: 137 MG/DL (ref 0–149)
TSH SERPL DL<=0.005 MIU/L-ACNC: 1.19 UIU/ML (ref 0.38–5.33)
WBC # BLD AUTO: 6.3 K/UL (ref 4.8–10.8)

## 2020-06-11 PROCEDURE — 36415 COLL VENOUS BLD VENIPUNCTURE: CPT

## 2020-06-11 PROCEDURE — 80061 LIPID PANEL: CPT

## 2020-06-11 PROCEDURE — 82306 VITAMIN D 25 HYDROXY: CPT

## 2020-06-11 PROCEDURE — 80053 COMPREHEN METABOLIC PANEL: CPT

## 2020-06-11 PROCEDURE — 83036 HEMOGLOBIN GLYCOSYLATED A1C: CPT | Mod: GA

## 2020-06-11 PROCEDURE — 86803 HEPATITIS C AB TEST: CPT

## 2020-06-11 PROCEDURE — 84439 ASSAY OF FREE THYROXINE: CPT

## 2020-06-11 PROCEDURE — 84432 ASSAY OF THYROGLOBULIN: CPT

## 2020-06-11 PROCEDURE — 86800 THYROGLOBULIN ANTIBODY: CPT

## 2020-06-11 PROCEDURE — 84443 ASSAY THYROID STIM HORMONE: CPT

## 2020-06-11 PROCEDURE — 85025 COMPLETE CBC W/AUTO DIFF WBC: CPT

## 2020-06-12 ENCOUNTER — TELEPHONE (OUTPATIENT)
Dept: MEDICAL GROUP | Facility: MEDICAL CENTER | Age: 75
End: 2020-06-12

## 2020-06-15 ENCOUNTER — APPOINTMENT (RX ONLY)
Dept: URBAN - METROPOLITAN AREA CLINIC 4 | Facility: CLINIC | Age: 75
Setting detail: DERMATOLOGY
End: 2020-06-15

## 2020-06-15 DIAGNOSIS — L57.0 ACTINIC KERATOSIS: ICD-10-CM

## 2020-06-15 DIAGNOSIS — L81.4 OTHER MELANIN HYPERPIGMENTATION: ICD-10-CM

## 2020-06-15 DIAGNOSIS — L90.5 SCAR CONDITIONS AND FIBROSIS OF SKIN: ICD-10-CM

## 2020-06-15 DIAGNOSIS — Z85.828 PERSONAL HISTORY OF OTHER MALIGNANT NEOPLASM OF SKIN: ICD-10-CM

## 2020-06-15 DIAGNOSIS — L82.1 OTHER SEBORRHEIC KERATOSIS: ICD-10-CM

## 2020-06-15 PROBLEM — D48.5 NEOPLASM OF UNCERTAIN BEHAVIOR OF SKIN: Status: ACTIVE | Noted: 2020-06-15

## 2020-06-15 PROCEDURE — ? ADDITIONAL NOTES

## 2020-06-15 PROCEDURE — 17003 DESTRUCT PREMALG LES 2-14: CPT

## 2020-06-15 PROCEDURE — ? OBSERVATION AND MEASURE

## 2020-06-15 PROCEDURE — 99213 OFFICE O/P EST LOW 20 MIN: CPT | Mod: 25

## 2020-06-15 PROCEDURE — ? LIQUID NITROGEN

## 2020-06-15 PROCEDURE — 11102 TANGNTL BX SKIN SINGLE LES: CPT

## 2020-06-15 PROCEDURE — ? BIOPSY BY SHAVE METHOD

## 2020-06-15 PROCEDURE — ? DIAGNOSIS COMMENT

## 2020-06-15 PROCEDURE — ? COUNSELING

## 2020-06-15 PROCEDURE — 17000 DESTRUCT PREMALG LESION: CPT | Mod: 59

## 2020-06-15 ASSESSMENT — LOCATION DETAILED DESCRIPTION DERM
LOCATION DETAILED: LEFT INFERIOR ANTERIOR NECK
LOCATION DETAILED: LEFT INFERIOR LATERAL LOWER BACK
LOCATION DETAILED: LEFT CENTRAL ZYGOMA
LOCATION DETAILED: RIGHT DISTAL RADIAL DORSAL FOREARM
LOCATION DETAILED: RIGHT MEDIAL POSTERIOR ANKLE
LOCATION DETAILED: LEFT SUPERIOR UPPER BACK
LOCATION DETAILED: RIGHT ANTERIOR LATERAL DISTAL THIGH

## 2020-06-15 ASSESSMENT — LOCATION SIMPLE DESCRIPTION DERM
LOCATION SIMPLE: LEFT UPPER BACK
LOCATION SIMPLE: RIGHT THIGH
LOCATION SIMPLE: LEFT LOWER BACK
LOCATION SIMPLE: LEFT ANTERIOR NECK
LOCATION SIMPLE: LEFT ZYGOMA
LOCATION SIMPLE: RIGHT ANKLE
LOCATION SIMPLE: RIGHT FOREARM

## 2020-06-15 ASSESSMENT — LOCATION ZONE DERM
LOCATION ZONE: FACE
LOCATION ZONE: TRUNK
LOCATION ZONE: LEG
LOCATION ZONE: NECK
LOCATION ZONE: ARM

## 2020-06-15 NOTE — PROCEDURE: BIOPSY BY SHAVE METHOD
Detail Level: Detailed
Depth Of Biopsy: dermis
Was A Bandage Applied: Yes
Size Of Lesion In Cm: 0.7
X Size Of Lesion In Cm: 0
Biopsy Type: H and E
Biopsy Method: Personna blade
Anesthesia Type: 1% lidocaine with 1:100,000 epinephrine and a 1:10 solution of 8.4% sodium bicarbonate
Anesthesia Volume In Cc: 1
Hemostasis: Aluminum Chloride
Wound Care: Petrolatum
Dressing: bandage
Destruction After The Procedure: No
Type Of Destruction Used: Cryotherapy
Curettage Text: The wound bed was treated with curettage after the biopsy was performed.
Cryotherapy Text: The wound bed was treated with cryotherapy after the biopsy was performed.
Electrodesiccation Text: The wound bed was treated with electrodesiccation after the biopsy was performed.
Electrodesiccation And Curettage Text: The wound bed was treated with electrodesiccation and curettage after the biopsy was performed.
Silver Nitrate Text: The wound bed was treated with silver nitrate after the biopsy was performed.
Lab: [id number]
Lab Facility: 
Consent: Written consent was obtained and risks were reviewed including but not limited to scarring, infection, bleeding, scabbing, incomplete removal, nerve damage and allergy to anesthesia.
Post-Care Instructions: I reviewed with the patient in detail post-care instructions. Patient is to keep the biopsy site dry overnight, and then apply bacitracin/petroleum twice daily until healed.
Notification Instructions: Patient will be notified of biopsy results. However, patient instructed to call the office if not contacted within 2 weeks.
Billing Type: Third-Party Bill
Information: Selecting Yes will display possible errors in your note based on the variables you have selected. This validation is only offered as a suggestion for you. PLEASE NOTE THAT THE VALIDATION TEXT WILL BE REMOVED WHEN YOU FINALIZE YOUR NOTE. IF YOU WANT TO FAX A PRELIMINARY NOTE YOU WILL NEED TO TOGGLE THIS TO 'NO' IF YOU DO NOT WANT IT IN YOUR FAXED NOTE.

## 2020-06-15 NOTE — PROCEDURE: DIAGNOSIS COMMENT
Detail Level: Detailed
Comment: See Photo from 09/12/2018
Comment: History of skin cancers
Comment: Hx of BCC nodular type with margins uninvolved

## 2020-06-17 ENCOUNTER — HOSPITAL ENCOUNTER (OUTPATIENT)
Dept: RADIOLOGY | Facility: MEDICAL CENTER | Age: 75
End: 2020-06-17
Attending: INTERNAL MEDICINE
Payer: MEDICARE

## 2020-06-17 DIAGNOSIS — Z12.31 ENCOUNTER FOR SCREENING MAMMOGRAM FOR BREAST CANCER: ICD-10-CM

## 2020-06-17 DIAGNOSIS — R06.02 SHORTNESS OF BREATH: ICD-10-CM

## 2020-06-17 DIAGNOSIS — M81.0 POSTMENOPAUSAL BONE LOSS: ICD-10-CM

## 2020-06-17 PROCEDURE — 77067 SCR MAMMO BI INCL CAD: CPT

## 2020-06-17 PROCEDURE — 77080 DXA BONE DENSITY AXIAL: CPT

## 2020-06-17 PROCEDURE — 71250 CT THORAX DX C-: CPT

## 2020-06-18 ENCOUNTER — TELEPHONE (OUTPATIENT)
Dept: MEDICAL GROUP | Facility: MEDICAL CENTER | Age: 75
End: 2020-06-18

## 2020-06-18 DIAGNOSIS — Z00.00 PREVENTATIVE HEALTH CARE: Chronic | ICD-10-CM

## 2020-06-18 NOTE — TELEPHONE ENCOUNTER
----- Message from Asael Redd M.D. sent at 6/18/2020 12:40 PM PDT -----  Please notify the patient that:  (1) her mammogram is negative but does show dense breast tissue which may decrease the accuracy of the mammogram  (2) she can get a screening ultrasound of her breast which may provide further detail  (3) her insurance will not cover a screening breast ultrasound, she would have to pay out of pocket, the cost would be approximately $125  (4) please let me know if she is interested

## 2020-06-18 NOTE — TELEPHONE ENCOUNTER
Left a message for patient to call back at [phone].     Ale Leonard  [facility] Assistant

## 2020-06-18 NOTE — TELEPHONE ENCOUNTER
----- Message from Asael Redd M.D. sent at 6/18/2020 12:40 PM PDT -----  Please notify the patient that her bone density shows osteopenia which is a slight decrease in bone strength but this is not osteoporosis, her bone density test in her back is normal but is slightly decreased in her hip, there is no need for medications.  Have her work on a regular exercise program, continue vitamin D, and we can repeat the bone density test in 2 years

## 2020-06-18 NOTE — TELEPHONE ENCOUNTER
Please notify the patient that:  (1) her mammogram is negative but does show dense breast tissue which may decrease the accuracy of the mammogram  (2) she can get a screening ultrasound of her breast which may provide further detail  (3) her insurance will not cover a screening breast ultrasound, she would have to pay out of pocket, the cost would be approximately $125  (4) please let me know if she is interested    Please notify the patient that her bone density shows osteopenia which is a slight decrease in bone strength but this is not osteoporosis, her bone density test in her back is normal but is slightly decreased in her hip, there is no need for medications.  Have her work on a regular exercise program, continue vitamin D, and we can repeat the bone density test in 2 years

## 2020-06-19 ENCOUNTER — TELEPHONE (OUTPATIENT)
Dept: MEDICAL GROUP | Facility: MEDICAL CENTER | Age: 75
End: 2020-06-19

## 2020-06-19 DIAGNOSIS — R05.9 COUGH: ICD-10-CM

## 2020-06-19 DIAGNOSIS — R93.89 ABNORMAL CHEST CT: ICD-10-CM

## 2020-06-19 PROBLEM — I25.10 CORONARY ARTERY CALCIFICATION: Status: ACTIVE | Noted: 2020-06-19

## 2020-06-19 PROBLEM — I25.84 CORONARY ARTERY CALCIFICATION: Status: ACTIVE | Noted: 2020-06-19

## 2020-06-19 NOTE — TELEPHONE ENCOUNTER
Notified with results, still has persistent cough, based upon CT findings recommend evaluation by pulmonary, we need to update her chest x-ray as well, will defer repeat PFT to pulmonary also has coronary calcifications on CT high-resolution thorax, is on statin therapy, recommend CT calcium score to further delineate risk based upon calcium burden, she declines for now, she will continue atorvastatin, she has follow-up to discuss next month.

## 2020-07-08 ENCOUNTER — OFFICE VISIT (OUTPATIENT)
Dept: PULMONOLOGY | Facility: HOSPICE | Age: 75
End: 2020-07-08
Payer: MEDICARE

## 2020-07-08 VITALS
SYSTOLIC BLOOD PRESSURE: 128 MMHG | BODY MASS INDEX: 25.86 KG/M2 | OXYGEN SATURATION: 95 % | WEIGHT: 151.5 LBS | HEART RATE: 69 BPM | HEIGHT: 64 IN | DIASTOLIC BLOOD PRESSURE: 70 MMHG

## 2020-07-08 DIAGNOSIS — R91.8 PULMONARY NODULES: ICD-10-CM

## 2020-07-08 DIAGNOSIS — R93.89 ABNORMAL CT OF THE CHEST: ICD-10-CM

## 2020-07-08 PROCEDURE — 99204 OFFICE O/P NEW MOD 45 MIN: CPT | Performed by: INTERNAL MEDICINE

## 2020-07-08 RX ORDER — TIOTROPIUM BROMIDE INHALATION SPRAY 1.56 UG/1
2 SPRAY, METERED RESPIRATORY (INHALATION) DAILY
Qty: 1 INHALER | Refills: 0 | COMMUNITY
Start: 2020-07-08 | End: 2020-07-20

## 2020-07-08 ASSESSMENT — FIBROSIS 4 INDEX: FIB4 SCORE: 1.16

## 2020-07-08 ASSESSMENT — ENCOUNTER SYMPTOMS
NEUROLOGICAL NEGATIVE: 1
GASTROINTESTINAL NEGATIVE: 1
EYES NEGATIVE: 1
CONSTITUTIONAL NEGATIVE: 1
PSYCHIATRIC NEGATIVE: 1
COUGH: 1
CARDIOVASCULAR NEGATIVE: 1
MUSCULOSKELETAL NEGATIVE: 1

## 2020-07-08 NOTE — ASSESSMENT & PLAN NOTE
Cystic changes in the lung c/w emphysema  Hx of extensive second hand smoke  She has a chronic cough which may be explained by the emphysema even though pfts normal trial of spiriva respimat sample given  Reviewed side effects and use of medication

## 2020-07-08 NOTE — ASSESSMENT & PLAN NOTE
Ground glass 2 nodules  As they are GG and will need 3 month follow up per fleishner guidelines  Reviewed with pt differential of GG nodules that could be infection versus inflammation versus malignancy

## 2020-07-08 NOTE — PROGRESS NOTES
Pulmonary Consultation    Date of Service: 7/8/2020    Consulting Physician: Asael Redd M.D.    Reason for consult:  Establish Care (Referred by Dr Redd for cough/abnormal chest CT) and Other (CT-Chest HR  06/19/20)      Problem List Items Addressed This Visit     Pulmonary nodules     Ground glass 2 nodules  As they are GG and will need 3 month follow up per flespner guidelines  Reviewed with pt differential of GG nodules that could be infection versus inflammation versus malignancy         Relevant Orders    CT-CHEST (THORAX) W/O    Abnormal CT of the chest     Cystic changes in the lung c/w emphysema  Hx of extensive second hand smoke  She has a chronic cough which may be explained by the emphysema even though pfts normal trial of spiriva respimat sample given  Reviewed side effects and use of medication                 History of Present Illness: Ping Das is a 74 y.o. female with a past medical history of chronic cough for several month  She notices the cough in [employer], library but not when sleeping    Referred for cough and abnormal CT chest  Ct chest 6/17/20: HRCT personally reviewed the cystic changes are consistent with emphysematous changes. There are 2 < 1 cm GG nodules in RUL subpleural    Exercise tolerance:  Walking distance: no problems  [place]: able to climb    Night time symptoms: no problems    Pulmonary History:    Environmental exposures: no hot tub; no woodstove, no mining work, and no asbestos exposure    Pets: none  Occupation History: worked in the [employer] in [employer]  Family history of pulmonary disease:   Second hand smoke exposure:extensive, parents and spouse    Review of Systems   Constitutional: Negative.    HENT: Negative.    Eyes: Negative.    Respiratory: Positive for cough.    Cardiovascular: Negative.    Gastrointestinal: Negative.    Genitourinary: Negative.    Musculoskeletal: Negative.    Skin: Negative.    Neurological: Negative.    Endo/Heme/Allergies: Negative.  "   Psychiatric/Behavioral: Negative.        Current Outpatient Medications on File Prior to Visit   Medication Sig Dispense Refill   • potassium chloride SA (K-DUR) 10 MEQ Tab CR TAKE 1 TABLET BY MOUTH EVERY DAY 30 Tab 1   • atorvastatin (LIPITOR) 20 MG Tab TAKE 1 TABLET BY MOUTH EVERY DAY 30 Tab 1   • pantoprazole (PROTONIX) 20 MG tablet Take 1 Tab by mouth every day. 30 Tab 2   • naproxen (NAPROSYN) 500 MG Tab Take 1 Tab by mouth 2 times a day as needed (pain). 60 Tab 2   • fluticasone (FLONASE) 50 MCG/ACT nasal spray Spray 2 Sprays in nose every day. EACH NOSTRIL 48 g 3   • Multiple Vitamin (MULTI-DAY PO) Take  by mouth.     • Calcium Carbonate-Vitamin D (CALCIUM 600/VITAMIN D PO) Take  by mouth.     • cyanocobalamin (VITAMIN B-12) 500 MCG Tab Take 500 mcg by mouth every day.     • Cholecalciferol (VITAMIN D3 PO) Take  by mouth.     • Glucosamine HCl (GLUCOSAMINE PO) Take  by mouth.     • Omega-3 Fatty Acids (OMEGA 3 PO) Take  by mouth.     • Cetirizine HCl (ZYRTEC ALLERGY PO) Take  by mouth.     • acyclovir (ZOVIRAX) 400 MG tablet Take 400 mg by mouth 2 times a day.     • levothyroxine (SYNTHROID) 100 MCG TABS Take 100 mcg by mouth every day. Takes 1 1/2 tabs per day        No current facility-administered medications on file prior to visit.        Social History     Tobacco Use   • Smoking status: Passive Smoke Exposure - Never Smoker   • Smokeless tobacco: Never Used   • Tobacco comment: Parents smoked/ smoked/worked in [employer]   Substance Use Topics   • Alcohol use: Yes     Alcohol/week: 0.6 oz     Types: 1 Glasses of wine per week     Comment: occasional   • Drug use: No        Past Medical History:   Diagnosis Date   • Anesthesia     \"does not come out of it very well\"   • Cancer (HCC)     thyroid and facial   • CATARACT    • Colon polyp 5/15/2009   • Cough    • Depression 5/15/2009   • Dyslipidemia 5/15/2009   • Hypothyroid 5/15/2009   • Hypovitaminosis D 5/15/2009   • Palpitations 5/15/2009   • " "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   • Preventative health care 5/15/2009   • Shortness of breath     when walking   • Unspecified urinary incontinence        Past Surgical History:   Procedure Laterality Date   • VITRECTOMY POSTERIOR  11/17/2011    Performed by TRACEY RAMON at SURGERY SAME DAY [facility] ORS   • OTHER  August 2007    thyroid   • OTHER  Nov 2006    cataract   • OTHER  July 2006    Skin /face       Allergies: Kenalog    Family History   Problem Relation Age of Onset   • Cancer Mother    • Heart Disease Father    • Heart Disease Sister    • Cancer Daughter        Vitals:    07/08/20 1344   Height: 1.626 m (5' 4\")   Weight: 68.7 kg (151 lb 8 oz)   Weight % change since last entry.: 0 %   BP: 128/70   Pulse: 69   BMI (Calculated): 26       Physical Examination  Physical Exam   Constitutional: She is oriented to person, place, and time. No distress.   HENT:   Head: Normocephalic and atraumatic.   Right Ear: External ear normal.   Left Ear: External ear normal.   Mouth/Throat: Oropharynx is clear and moist.   Eyes: Pupils are equal, round, and reactive to light. Conjunctivae and EOM are normal.   Neck: Normal range of motion. Neck supple. No JVD present. No tracheal deviation present. No thyromegaly present.   Cardiovascular: Normal rate, regular rhythm and intact distal pulses. Exam reveals no gallop and no friction rub.   No murmur heard.  Loud second heart sound   Pulmonary/Chest: No stridor. No respiratory distress. She has no wheezes. She has no rales. She exhibits no tenderness.   Abdominal: Soft. Bowel sounds are normal.   Musculoskeletal:         General: No tenderness, deformity or edema.   Lymphadenopathy:     She has no cervical adenopathy.   Neurological: She is alert and oriented to person, place, and time. No cranial nerve deficit. Gait normal. Coordination normal. GCS score is 15.   Skin: Skin is warm and dry. No rash noted. She is not diaphoretic.   Psychiatric: Mood, " "affect and judgment normal.     PFTS 11/2018  \"SPIROMETRY:  1.  FVC was 2.52 liters, which is 91% of predicted.  2.  FEV1 was 1.86 liters, 87% of predicted.  3.  FEV1/FVC ratio was normal at 74%.  4.  There was no significant response to bronchodilators.  5.  Flow volume loop had normal shape and size.     LUNG VOLUMES:  1.  Residual volume was 2.13 liters, 94% predicted.  2.  Total lung capacity was 4.54 liters, 89% predicted.     Diffusion capacity was normal at 120% predicted.     IMPRESSION:  The patient's pulmonary function test does not show any   obstructive or restrictive process other than slight increase in diffusion   capacity to 120% of predicted.  The patient mostly had normal pulmonary   function test.  Clinical correlation is required.\"    Mary Oates M.D., MD MPH [facility]  [facility]  "

## 2020-07-09 ENCOUNTER — OFFICE VISIT (OUTPATIENT)
Dept: MEDICAL GROUP | Facility: MEDICAL CENTER | Age: 75
End: 2020-07-09
Payer: MEDICARE

## 2020-07-09 VITALS
TEMPERATURE: 98.5 F | SYSTOLIC BLOOD PRESSURE: 136 MMHG | HEIGHT: 63 IN | WEIGHT: 156 LBS | HEART RATE: 60 BPM | BODY MASS INDEX: 27.64 KG/M2 | DIASTOLIC BLOOD PRESSURE: 74 MMHG | OXYGEN SATURATION: 93 %

## 2020-07-09 DIAGNOSIS — R05.9 COUGH: ICD-10-CM

## 2020-07-09 DIAGNOSIS — E87.6 LOW BLOOD POTASSIUM: ICD-10-CM

## 2020-07-09 DIAGNOSIS — J30.1 ALLERGIC RHINITIS DUE TO POLLEN, UNSPECIFIED SEASONALITY: ICD-10-CM

## 2020-07-09 DIAGNOSIS — E78.5 DYSLIPIDEMIA: ICD-10-CM

## 2020-07-09 PROCEDURE — 99213 OFFICE O/P EST LOW 20 MIN: CPT | Performed by: INTERNAL MEDICINE

## 2020-07-09 RX ORDER — ATORVASTATIN CALCIUM 20 MG/1
20 TABLET, FILM COATED ORAL
Qty: 90 TAB | Refills: 3
Start: 2020-07-09 | End: 2020-07-28 | Stop reason: SDUPTHER

## 2020-07-09 RX ORDER — NAPROXEN 500 MG/1
500 TABLET ORAL 2 TIMES DAILY PRN
Qty: 180 TAB | Refills: 1
Start: 2020-07-09 | End: 2021-12-19

## 2020-07-09 RX ORDER — LEVOTHYROXINE SODIUM 88 UG/1
88 TABLET ORAL
COMMUNITY
End: 2021-04-19 | Stop reason: SDUPTHER

## 2020-07-09 RX ORDER — POTASSIUM CHLORIDE 750 MG/1
10 TABLET, EXTENDED RELEASE ORAL
Qty: 90 TAB | Refills: 3
Start: 2020-07-09 | End: 2020-07-28 | Stop reason: SDUPTHER

## 2020-07-09 ASSESSMENT — FIBROSIS 4 INDEX: FIB4 SCORE: 1.16

## 2020-07-09 NOTE — PROGRESS NOTES
Subjective:      Ping Das is a 74 y.o. female who presents with blood pressure Follow-Up            HPI     Has blood pressure readings done at home using omaron blood pressure cuff seen by pulmonary yesterday and given spiriva and told to stop allergy medications, and will get follow up CT scan. Cough is unchanged and no associated sob with cough or activity. No covid exposures. Continues on statin,  still on lipitor for cholesterol no muscle pain. No tobacco. No history of chf, no chest pains. Has abnormal ct chest 6/17/20 CT high-resolution thorax no evidence of interstitial thickening or honeycombing, scattered cystic lucencies, consideration lymphocytic interstitial pneumonitis, emphysema, lymphangiomyomatosis, focal groundglass opacities upper lobes, coronary calcifications      Current Outpatient Medications   Medication Sig Dispense Refill   • levothyroxine (SYNTHROID) 88 MCG Tab Take 88 mcg by mouth Every morning on an empty stomach.     • Tiotropium Bromide Monohydrate (SPIRIVA RESPIMAT) 1.25 MCG/ACT Aero Soln Inhale 2 Inhalation by mouth every day. 1 Inhaler 0   • potassium chloride SA (K-DUR) 10 MEQ Tab CR TAKE 1 TABLET BY MOUTH EVERY DAY 30 Tab 1   • atorvastatin (LIPITOR) 20 MG Tab TAKE 1 TABLET BY MOUTH EVERY DAY 30 Tab 1   • naproxen (NAPROSYN) 500 MG Tab Take 1 Tab by mouth 2 times a day as needed (pain). 60 Tab 2   • fluticasone (FLONASE) 50 MCG/ACT nasal spray Spray 2 Sprays in nose every day. EACH NOSTRIL 48 g 3   • Multiple Vitamin (MULTI-DAY PO) Take  by mouth.     • Calcium Carbonate-Vitamin D (CALCIUM 600/VITAMIN D PO) Take  by mouth.     • cyanocobalamin (VITAMIN B-12) 500 MCG Tab Take 500 mcg by mouth every day.     • Cholecalciferol (VITAMIN D3 PO) Take  by mouth.     • Glucosamine HCl (GLUCOSAMINE PO) Take  by mouth.     • Omega-3 Fatty Acids (OMEGA 3 PO) Take  by mouth.     • Cetirizine HCl (ZYRTEC ALLERGY PO) Take  by mouth.     • acyclovir (ZOVIRAX) 400 MG tablet Take 400 mg by  "mouth 2 times a day.     • pantoprazole (PROTONIX) 20 MG tablet Take 1 Tab by mouth every day. (Patient not taking: Reported on 7/9/2020) 30 Tab 2     No current facility-administered medications for this visit.      Patient Active Problem List   Diagnosis   • History thyroid cancer   • Dyslipidemia   • History depression   • Tubular adenoma of colon   • Preventative health care   • Osteopenia   • Ocular migraine   • Cough   • History compression fracture of spine   • HSV infection   • Microscopic hematuria   • Impaired glucose metabolism   • Skin lesion   • Coronary artery calcification   • Pulmonary nodules   • Abnormal CT of the chest         ROS       Objective:     /74 (BP Location: Left arm, Patient Position: Sitting, BP Cuff Size: Adult) Comment: 177/83 LA Sitting/Talking  159/80 LA Sitting Silent  Pulse 60   Temp 36.9 °C (98.5 °F)   Ht 1.6 m (5' 3\")   Wt 70.8 kg (156 lb)   SpO2 93%   BMI 27.63 kg/m²      Physical Exam  Vitals signs and nursing note reviewed.   Constitutional:       Appearance: Normal appearance.   HENT:      Head: Normocephalic and atraumatic.      Right Ear: Tympanic membrane normal.      Left Ear: Tympanic membrane normal.   Cardiovascular:      Rate and Rhythm: Normal rate and regular rhythm.   Pulmonary:      Effort: Pulmonary effort is normal. No respiratory distress.      Breath sounds: Normal breath sounds.   Abdominal:      General: There is no distension.   Skin:     General: Skin is warm.   Neurological:      General: No focal deficit present.      Mental Status: She is alert.   Psychiatric:         Mood and Affect: Mood normal.         Behavior: Behavior normal.       No s4 on cardiac exam           Assessment/Plan:        Assessment  #1 elevated home blood pressures but normal in office readings today and yesterday at pulmonary     #2 cough seen by pulmonary and given spiriva     #3 coronary artery calcification on CT high resolution no symptoms    #4 dyslipidemia on " lipitor stable    #5 abnormal ct thorax yoana resolution 6/17/20 CT high-resolution thorax no evidence of interstitial thickening or honeycombing, scattered cystic lucencies, consideration lymphocytic interstitial pneumonitis, emphysema, lymphangiomyomatosis, focal groundglass opacities upper lobes, coronary calcifications      Plan  #! Recommend get a different blood pressure cuff since there is a discrepancy with our office readinggs and with yesterdays readings    #2 offered CT calcium score but elects to defer    #3 follow up CT thorax per pulmonary     #4 start spiriva daily and follow up pulmonary    #5 follow up in november     #6 continue lipitor

## 2020-07-20 ENCOUNTER — PATIENT MESSAGE (OUTPATIENT)
Dept: PULMONOLOGY | Facility: HOSPICE | Age: 75
End: 2020-07-20

## 2020-07-20 RX ORDER — TIOTROPIUM BROMIDE INHALATION SPRAY 3.12 UG/1
2 SPRAY, METERED RESPIRATORY (INHALATION) DAILY
Qty: 1 G | Refills: 11 | Status: SHIPPED | OUTPATIENT
Start: 2020-07-20 | End: 2020-07-23

## 2020-07-23 ENCOUNTER — PATIENT MESSAGE (OUTPATIENT)
Dept: PULMONOLOGY | Facility: HOSPICE | Age: 75
End: 2020-07-23

## 2020-07-23 NOTE — TELEPHONE ENCOUNTER
"From: Ping Das  To: Mary Oates M.D.  Sent: 7/23/2020 9:35 AM PDT  Subject: Prescription Question    You so promptly sent in a prescription for Spiriva respimat to [facility] this week. However, my insurance company does not authorize Spiriva. They are suggesting \"incruse Ellipta\" as an alternative. Do you agree that this would be a satisfactory substitution? If so, could you please forward a prescription to [facility] #[id number], at [address]. Thank you for your guidance and assistance.   Ping Das  "

## 2020-08-26 ENCOUNTER — PATIENT MESSAGE (OUTPATIENT)
Dept: PULMONOLOGY | Facility: HOSPICE | Age: 75
End: 2020-08-26

## 2020-08-26 DIAGNOSIS — R05.9 COUGH: ICD-10-CM

## 2020-08-27 NOTE — PROGRESS NOTES
Called and discussed about her cough which she state no improvement despite being on incruse. Review PFTs -> no airflow obstruction noted but there is significantly DLCO suggestive of airway reactive disease. Will start trelegy sample and if her cough improve and obtain repeat PFTs.     Jaz Koroma MD   Pulmonary Critical Care   [facility]

## 2020-10-07 ENCOUNTER — HOSPITAL ENCOUNTER (OUTPATIENT)
Dept: LAB | Facility: MEDICAL CENTER | Age: 75
End: 2020-10-07
Attending: PHYSICIAN ASSISTANT
Payer: MEDICARE

## 2020-10-07 LAB
T4 FREE SERPL-MCNC: 1.56 NG/DL (ref 0.93–1.7)
TSH SERPL DL<=0.005 MIU/L-ACNC: 0.14 UIU/ML (ref 0.38–5.33)

## 2020-10-07 PROCEDURE — 84443 ASSAY THYROID STIM HORMONE: CPT

## 2020-10-07 PROCEDURE — 84439 ASSAY OF FREE THYROXINE: CPT

## 2020-10-07 PROCEDURE — 36415 COLL VENOUS BLD VENIPUNCTURE: CPT

## 2020-10-12 ENCOUNTER — HOSPITAL ENCOUNTER (OUTPATIENT)
Dept: RADIOLOGY | Facility: MEDICAL CENTER | Age: 75
End: 2020-10-12
Attending: INTERNAL MEDICINE
Payer: MEDICARE

## 2020-10-12 DIAGNOSIS — R91.8 PULMONARY NODULES: ICD-10-CM

## 2020-10-12 PROCEDURE — 71250 CT THORAX DX C-: CPT

## 2020-11-02 PROBLEM — J30.9 ALLERGIC RHINITIS: Status: ACTIVE | Noted: 2020-11-02

## 2020-11-03 ENCOUNTER — OFFICE VISIT (OUTPATIENT)
Dept: SLEEP MEDICINE | Facility: MEDICAL CENTER | Age: 75
End: 2020-11-03
Payer: MEDICARE

## 2020-11-03 VITALS
WEIGHT: 152.6 LBS | HEART RATE: 72 BPM | HEIGHT: 64 IN | DIASTOLIC BLOOD PRESSURE: 68 MMHG | RESPIRATION RATE: 16 BRPM | OXYGEN SATURATION: 95 % | SYSTOLIC BLOOD PRESSURE: 126 MMHG | BODY MASS INDEX: 26.05 KG/M2

## 2020-11-03 DIAGNOSIS — R91.8 PULMONARY NODULES: ICD-10-CM

## 2020-11-03 DIAGNOSIS — R93.89 ABNORMAL CT OF THE CHEST: ICD-10-CM

## 2020-11-03 DIAGNOSIS — Z85.850 HISTORY OF THYROID CANCER: Chronic | ICD-10-CM

## 2020-11-03 DIAGNOSIS — R05.9 COUGH: ICD-10-CM

## 2020-11-03 PROCEDURE — 99214 OFFICE O/P EST MOD 30 MIN: CPT | Performed by: INTERNAL MEDICINE

## 2020-11-03 ASSESSMENT — FIBROSIS 4 INDEX: FIB4 SCORE: 1.16

## 2020-11-03 NOTE — PROGRESS NOTES
Ping Das is a 74 y.o. female here for cough and results of imaging. Patient was referred by her primary care.    History of Present Illness:    This lady comes in today to follow-up on her CT imaging, 3 months ago was seen with cough.  Trials of Incruse, Spiriva, Trelegy were not successful.  Of note her baseline lung function testing is normal.    She has a reactive type cough, deep inhalation triggers, I strongly suspect this is vocal cord origin and GERD may well be contributing.  I advised antireflux measures.    I explained to her the right upper lobe 5 mm subpleural densities are of no concern, she does have secondhand smoke exposure but we will check these again in 6 months, if stable then stretch it out to once a year.  Lung function testing was normal.  Allergy work-up is pending.  Reactive airways do not seem to be problematic.  Exertional shortness of breath is likely conditioning, lung function is remarkably good.  We will check her in 6 months with the imaging, sooner if needed  Constitutional ROS: No unexpected change in weight, No unexplained fevers  Eyes: No change in vision or blurring or double vision  Mouth/Throat ROS: No sore throat, No recent change in voice or hoarseness  Pulmonary ROS: See present history for pertinent positives  Cardiovascular ROS: No chest pain to suggest acute coronary syndrome  Gastrointestinal ROS: No abdominal pain to suggest peptic disease  Musculoskeletal/Extremities ROS: no acute artritis or unusual swelling  Hematologic/Lymphatic ROS: No easy bleeding or unusual lymph node swelling  Neurologic ROS: No new or unusual weakness  Psychiatric ROS: No hallucinations  Allergic/Immunologic: No  urticaria or allergic rash      Current Outpatient Medications   Medication Sig Dispense Refill   • potassium chloride SA (K-DUR) 10 MEQ Tab CR Take 1 Tab by mouth every day. 90 Tab 3   • atorvastatin (LIPITOR) 20 MG Tab Take 1 Tab by mouth every day. 90 Tab 3   • fluticasone  "(FLONASE) 50 MCG/ACT nasal spray Spray 2 Sprays in nose every day. EACH NOSTRIL 48 g 3   • levothyroxine (SYNTHROID) 88 MCG Tab Take 88 mcg by mouth Every morning on an empty stomach.     • naproxen (NAPROSYN) 500 MG Tab Take 1 Tab by mouth 2 times a day as needed (pain). 180 Tab 1   • Multiple Vitamin (MULTI-DAY PO) Take  by mouth.     • Calcium Carbonate-Vitamin D (CALCIUM 600/VITAMIN D PO) Take  by mouth.     • cyanocobalamin (VITAMIN B-12) 500 MCG Tab Take 500 mcg by mouth every day.     • Cholecalciferol (VITAMIN D3 PO) Take  by mouth.     • Glucosamine HCl (GLUCOSAMINE PO) Take  by mouth.     • Omega-3 Fatty Acids (OMEGA 3 PO) Take  by mouth.     • Cetirizine HCl (ZYRTEC ALLERGY PO) Take  by mouth.     • acyclovir (ZOVIRAX) 400 MG tablet Take 400 mg by mouth 2 times a day.       No current facility-administered medications for this visit.        Social History     Tobacco Use   • Smoking status: Passive Smoke Exposure - Never Smoker   • Smokeless tobacco: Never Used   • Tobacco comment: Parents smoked/ smoked/worked in [employer]   Substance Use Topics   • Alcohol use: Yes     Alcohol/week: 0.6 oz     Types: 1 Glasses of wine per week     Comment: occasional   • Drug use: No        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   • Preventative health care 5/15/2009   • Shortness of breath     when walking   • Unspecified urinary incontinence        Past Surgical History:   Procedure Laterality Date   • VITRECTOMY POSTERIOR  11/17/2011    Performed by TRACEY RAMON at SURGERY SAME DAY [facility] ORS   • OTHER  August 2007    thyroid   • OTHER  Nov 2006    cataract   • OTHER  July " "2006    Skin /face       [facility]    Family History   Problem Relation Age of Onset   • Cancer Mother    • Heart Disease Father    • Heart Disease Sister    • Cancer Daughter        Physical Examination    Vitals:    11/03/20 0910 11/03/20 0914   Height: 1.626 m (5' 4\")    Weight: 69.2 kg (152 lb 9.6 oz)    Weight % change since last entry.: 0 %    BP: 126/68    Pulse: 72    BMI (Calculated): 26.19    Resp: 16    O2 sat % room air:  95 %       General Appearance: alert, no distress  Skin: Skin color, texture, turgor normal. No rashes or lesions.  Eyes: negative  Oropharynx: Lips, mucosa, and tongue normal. Teeth and gums normal. Oropharynx moist and without lesion  Lungs: positive findings: Quiet and clear  Heart: negative. RRR without murmur, gallop, or rubs.  No ectopy.  Abdomen: Abdomen soft, non-tender. . No masses,  No organomegaly  Extremities:  No deformities, edema, or skin discoloration  Joints: No acute arthritis  Peripheral Pulses:perfused  Neurologic: intact grossly  No clubbing or cyanosis.  Oropharynx looks benign    II (soft palate, uvula, fauces visible)    Imaging: CT scan shows stable tiny nodules right upper lobe    PFTS: Previously normal      Assessment and Plan  1. Cough  Suspect GERD contributes, may need ENT reassessment of vocal cords    2. Pulmonary nodules  Stable on serial imaging  - CT-CHEST (THORAX) W/O; Future    3. Abnormal CT of the chest  Noted    4. History thyroid cancer  Remote    Followup Return in about 6 months (around 5/3/2021) for follow up visit with pulmonary physician.    "

## 2020-11-03 NOTE — PATIENT INSTRUCTIONS
This lady comes in today to follow-up on her CT imaging, 3 months ago was seen with cough.  Trials of Incruse, Spiriva, Trelegy were not successful.  Of note her baseline lung function testing is normal.    She has a reactive type cough, deep inhalation triggers, I strongly suspect this is vocal cord origin and GERD may well be contributing.  I advised antireflux measures.    I explained to her the right upper lobe 5 mm subpleural densities are of no concern, she does have secondhand smoke exposure but we will check these again in 6 months, if stable then stretch it out to once a year.  Lung function testing was normal.  Allergy work-up is pending.  Reactive airways do not seem to be problematic.  Exertional shortness of breath is likely conditioning, lung function is remarkably good.  We will check her in 6 months with the imaging, sooner if needed

## 2020-11-04 PROBLEM — R93.89 ABNORMAL CT OF THE CHEST: Status: RESOLVED | Noted: 2020-07-08 | Resolved: 2020-11-04

## 2020-11-10 ENCOUNTER — OFFICE VISIT (OUTPATIENT)
Dept: MEDICAL GROUP | Facility: MEDICAL CENTER | Age: 75
End: 2020-11-10
Payer: MEDICARE

## 2020-11-10 VITALS
SYSTOLIC BLOOD PRESSURE: 118 MMHG | HEART RATE: 80 BPM | HEIGHT: 64 IN | DIASTOLIC BLOOD PRESSURE: 64 MMHG | WEIGHT: 155 LBS | BODY MASS INDEX: 26.46 KG/M2 | OXYGEN SATURATION: 97 % | TEMPERATURE: 97.4 F

## 2020-11-10 DIAGNOSIS — Z91.89 OTHER SPECIFIED PERSONAL RISK FACTORS, NOT ELSEWHERE CLASSIFIED: ICD-10-CM

## 2020-11-10 DIAGNOSIS — I25.84 CORONARY ARTERY CALCIFICATION: ICD-10-CM

## 2020-11-10 DIAGNOSIS — R05.9 COUGH: ICD-10-CM

## 2020-11-10 DIAGNOSIS — I25.10 CORONARY ARTERY CALCIFICATION: ICD-10-CM

## 2020-11-10 DIAGNOSIS — E78.5 DYSLIPIDEMIA: Chronic | ICD-10-CM

## 2020-11-10 DIAGNOSIS — R91.8 PULMONARY NODULES: ICD-10-CM

## 2020-11-10 DIAGNOSIS — Z00.00 PREVENTATIVE HEALTH CARE: Chronic | ICD-10-CM

## 2020-11-10 PROCEDURE — 99214 OFFICE O/P EST MOD 30 MIN: CPT | Performed by: INTERNAL MEDICINE

## 2020-11-10 ASSESSMENT — FIBROSIS 4 INDEX: FIB4 SCORE: 1.16

## 2020-11-10 NOTE — PROGRESS NOTES
Subjective:      Ping Das is a 74 y.o. female who presents with cough        HPI     Patient is here to follow-up chronic cough  Medication, allergies, medical history, surgical history, social history reviewed and updated  Seen by pulmonary November 3 suspect GERD, consider ENT follow-up recommended 6 months, previous testing has included PFT in 2018 normal, CT high-resolution thorax in June, pulmonary started her on Spiriva at that time. Still with dry cough during the day and night, non productive, not related to exertion.  No reflux type symptoms.  Although she does have some throat clearing at times.  No hoarseness, although her voice has changed since her thyroid surgery.  No chest pain with activity, no shortness of breath or worsening cough with activity  Has seen  allergy and has had allergy testing now on singulair and astelin, has had skin testing indicating extensive environmental allergies.  Patient reports that her cough is not related to indoor versus outdoor.  Currently off spiriva no benefit  10/12/20 CT thorax stable 5 to 6 mm biapical groundglass nodules, no new nodules hyperinflated lungs, atherosclerosis with coronary calcification    Current Outpatient Medications   Medication Sig Dispense Refill   • potassium chloride SA (K-DUR) 10 MEQ Tab CR Take 1 Tab by mouth every day. 90 Tab 3   • atorvastatin (LIPITOR) 20 MG Tab Take 1 Tab by mouth every day. 90 Tab 3   • fluticasone (FLONASE) 50 MCG/ACT nasal spray Spray 2 Sprays in nose every day. EACH NOSTRIL 48 g 3   • levothyroxine (SYNTHROID) 88 MCG Tab Take 88 mcg by mouth Every morning on an empty stomach.     • naproxen (NAPROSYN) 500 MG Tab Take 1 Tab by mouth 2 times a day as needed (pain). 180 Tab 1   • Multiple Vitamin (MULTI-DAY PO) Take  by mouth.     • Calcium Carbonate-Vitamin D (CALCIUM 600/VITAMIN D PO) Take  by mouth.     • cyanocobalamin (VITAMIN B-12) 500 MCG Tab Take 500 mcg by mouth every day.     • Cholecalciferol  (VITAMIN D3 PO) Take  by mouth.     • Glucosamine HCl (GLUCOSAMINE PO) Take  by mouth.     • Omega-3 Fatty Acids (OMEGA 3 PO) Take  by mouth.     • Cetirizine HCl (ZYRTEC ALLERGY PO) Take  by mouth.     • acyclovir (ZOVIRAX) 400 MG tablet Take 400 mg by mouth 2 times a day.       No current facility-administered medications for this visit.                 Tubular Adenoma  2004 no records path unknown  4/30/12 [facility] colon tubular adenoma, repeat 5 yrs  10/20/17 colon per [facility] 5 polyps pathology 4 polyps adenomatous and benign polypoid tissue, severe diverticulosis, recommend repeat colonoscopy 3 years     Thoracic compression fracture  11/10/04 thoracic x-ray negative  3/4/14 cxr mid apex right lateral curvature thoracic spine may be positional or from mild scoliosis  3/7/16 cxr mid and upper thoracic spine compression fracture     Preventative health  1/19/12 pneumovax  1/19/12 tdap  3/5/14 zoster vaccine  2/5/15 prevnar  10/20/17 colon per [facility] 5 polyps pathology 4 polyps adenomatous and benign polypoid tissue, severe diverticulosis, recommend repeat colonoscopy 3 years  11/14/18 sonocine negative  11/27/18 pap per gyn   4/7/19 YOLANDA negative, EKG sinus, ultrasound aorta mild atherosclerotic changes, ultrasound carotid mild left plaque bulb done at [facility]  6/11/20 hep c ab negative   6/11/20 vit d 44  6/18/20 dexa LS-0.6,hip-1.4  6/18/20 mammogram heterogeneously dense breast tissue, offered screening breast ultrasound  2/5/20 shingrix second done     Osteopenia  10/08 dexa LS -1.0, hip -0.4  7/11 dexa LS -0.5,hip -1.2  2/13 vit d 50  3/15/14 vit d 31  3/25/15 vit d 33  6/12/15 dexa LS -0.6,hip -1.3;FRAX 10.5% major,hip 1.7%  3/7/16 cxr mid and upper thoracic spine compression fracture  5/17/16 vit d 29 take extra 2000 units daily  9/5/17 dexa LS-0.7,hip-1.2  9/5/17 vit d 42  6/11/20 vit d 44  6/18/20 dexa LS-0.6,hip-1.4     Ocular migraine     Microscopic hematuria  9/5/17 UA 2-5 RBC  9/7/17 urine  culture negative, repeat UA 2 weeks, order in system  9/27/17 UA 2-5 RBC, will order us renal  10/4/17 ultrasound renal negative repeat urinalysis 2 weeks, if still positive will check CT urogram and urology evaluation  10/31/17 UA no RBC urine culture negative     Impaired glucose metabolism  10/19/18 A1c 6.2%  6/11/20 A1c 5.8%     HSV acyclovir as needed     History thyroid cancer  8/07 s/p thyroidectomy 1.1 cm no evid of adenopathy sees   3/11 dr.abbott saunders note u/s thyroid in june, on levoxyl 100 mg  6/11 tsh 0.33, on levoxyl 100 mcg  1/12 tsh 3.9 on levoxyl 100 mcg followed by   8/12 dr.abbott saunders note, tsh 0.05,free t4 1.46, thyroglobulin <0.9, decrease levothyroxine to 100 mcg. Repeat thyroid ultrasound February 1/13 tsh 0.1, thyroglobulin less than 0.1; dr.abbott saunders note no changes  1/29/14 tsh 0.17,free t4 1.0,thyroglobulin <0.1 on levothyroxine 100 mcg  2/25/14 tsh 0.12, free t4, free t4 1.2,thyroglobulin 0.1 on levothyroxine 100 mcg  9/5/14 tsh 0.13,free t4 1.1 on levothyroxine 100 mcg one pill six days per week ,1/2 pill on day 7  9/20/14  endocrine note  1/6/15 tsh 1.5,free t4 0.99 on levothyroxine 100 mcg 6 days per week and 75 mcg one day per week  1/13/15  endocrine note  3/24/15 tsh 0.3,free t4 1.5 on levothyroxine 100 mcg 6 days per week  4/2/15  endocrine note continue same dose levothyroxine and follow up 6 months  9/30/15 tsh 0.09,free t4 1.3, thyroglobulin<0.1, antithyroglobulin antibody less<0.9; on synthroid 100 mcg 6 days a week, synthroid 75 mcg one day per week  10/6/15  endocrinology note decrease total thyroid dose by 25 mcg during the week  5/17/16 tsh 0.6,free t4 0.9 on synthroid 100 mcg 6 days per week and 50 mcg 1 day per week? Per  endocrinology  5/15/17 tsh 1.1,free t4 1.1 on levothyroxine 100 mcg six days per week  5/18/17  endocrine note change to levothyroxine 75 mcg daily   11/27/17 tsh 0.15,free t4  1.26, thyroglbulin< 0.1, antithyroglobulin < 0.9  followed by  endocrinology  1/19/18 tsh 0.9 on levothyroxine per  endocrinology  4/17/18 tsh 0.7, free t4 1.2 on levothyroxine per  endocrinology  10/18/18 tsh 0.3,free t4 1.2,on levothyroxine 100 mcg six days and 1/2 tab day 7  10/24/18  endocrine note change levothyroxine to half a tablet twice a week, full tablet 5 days a week, recheck labs after  1/23/19 tsh 1.6,free t4 0.98, thyroglobulin less than 0.1, antithyroglobulin less than 0.9 per endocrinology  6/21/19 tsh 0.37, free t4 1.13 on levothyroxine 100 mcg  half a tablet twice a week, full tablet 5 days a week per  endocrinology  10/16/19 tsh 0.4, free t4 1.3    12/30/19 tsh 0.6,free t4 1.02 on levothyroxine 88 mcg six days per week and 1/2 tab one day per week per endocrine   1/6/20  endocrine note continue on levothyroxine 88 mcg six days per week and 1/2 tab one day per week  7/10/20 leonid kang endocrine note with , tsh 1.9, free t4 1.3, thyroglobulin<0.1, thyroglobulin antibody<0.9, on levothyroxine 88 mcg six days per week and 1/2 tab one day per week, change to levothyroxine 75 mcg daily  10/7/20 tsh 0.13,free t4 1.5 per endocrinology  6/11/20 thyroglobulin less than 0.1, thyroglobulin antibody less than 0.9 on levothyroxine 88 mcg 6 days a week, levothyroxine 75 mcg 1 day a week  10/7/20 tsh 1.19, free t4 1.56 on levothyroxine 88 mcg 6 days a week, levothyroxine 75 mcg 1 day a week  10/25/20 leonid kang at  endocrine note on levothyroxine 88 mcg 6 days a week, levothyroxine 75 mcg 1 day a week t4 1.02 on levothyroxine 88 mcg six days per week and 1/2 tab one day per week per endocrine     History depression  Since 2001, having failed celexa and effexor?, lexapro working well  4/10 samples cymbalta  6/11 off cymbalta  7/24/14 trial wellbutrin 150 mg, PHQ-9 17  8/19/14 behavioral health note; pt will follow up with  psychotherapy  9/22/14 increase wellbutrin to 300 mgdepression  10/21/14 PHQ 14  10/21/14 decrease wellbutrin to 150 mg and add effexor XR 75 mg qday  1/25/15 off wellbutrin     Dyslipidemia  2/09 chol 204,trig 130, hdl 62, ldl 116  5/10 chol 142,trig 63,hdl 64,ldl 65  6/11 chol 120,trig 65,hdl 68,ldl 39 on mevacor and niaspan  1/12 chol 98,trig 55,hdl 40,ldl 47 on mevacor and niaspan; ok to stop niaspan  2/13 chol 172,trig 314,hdl 45,ldl 64 on mevacor 40 mg; start fish oil 2 bid   3/15/14 chol 198,trig 101,hdl 54,ldl 124 on mevacor 40 mg and fish oil  7/29/14 dietary evaluation and education  3/25/15 chol 175,trig 107,hdl 54,ldl 100 on mevacor 40 mg  5/17/16 chol 196,trig 105,hdl 52,ldl 123 on mevacor 40 mg  9/5/17 chol 206,trig 139,hdl 48,ldl 130 on mevacor 40 mg  9/8/17 change to lipitor 20 mg and repeat labs in 4 weeks  11/1/17 chol 179,trig 168,hdl 44,ldl 101 on lipitor 20 mg  10/19/18 chol 188,trig 170,hdl 47,ldl 107 on lipitor 20 mg   6/11/20 chol 181,trig 137,hdl 49,ldl 105 on lipitor 20 mg     Tubular Adenoma  2004 no records path unknown  4/30/12 [facility] colon tubular adenoma, repeat 5 yrs  10/20/17 colon per [facility] 5 polyps pathology 4 polyps adenomatous and benign polypoid tissue, severe diverticulosis, recommend repeat colonoscopy 3 years     Thoracic compression fracture  11/10/04 thoracic x-ray negative  3/4/14 cxr mid apex right lateral curvature thoracic spine may be positional or from mild scoliosis  3/7/16 cxr mid and upper thoracic spine compression fracture     Preventative health  1/19/12 pneumovax  1/19/12 tdap  3/5/14 zoster vaccine  2/5/15 prevnar  6/12/15 dexa LS -0.6,hip -1.3;FRAX 10.5% major,hip 1.7%  9/5/17 vit d 42  10/20/17 colon per [facility] 5 polyps pathology 4 polyps adenomatous and benign polypoid tissue, severe diverticulosis, recommend repeat colonoscopy 3 years  9/12/18 mammogram heterogeneously dense breast tissue offered sonocine  10/11/18 shingles prescription to get at  pharmacy  11/14/18 sonocine negative  11/27/18 pap per gyn      Osteopenia  10/08 dexa LS -1.0, hip -0.4  7/11 dexa LS -0.5,hip -1.2  2/13 vit d 50  3/15/14 vit d 31  3/25/15 vit d 33  6/12/15 dexa LS -0.6,hip -1.3;FRAX 10.5% major,hip 1.7%  3/7/16 cxr mid and upper thoracic spine compression fracture  5/17/16 vit d 29 take extra 2000 units daily  9/5/17 dexa LS-0.7,hip-1.2  9/5/17 vit d 42     Ocular migraine     Microscopic hematuria  9/5/17 UA 2-5 RBC  9/7/17 urine culture negative, repeat UA 2 weeks, order in system  9/27/17 UA 2-5 RBC, will order us renal  10/4/17 ultrasound renal negative repeat urinalysis 2 weeks, if still positive will check CT urogram and urology evaluation  10/31/17 UA no RBC urine culture negative     Impaired glucose metabolism  10/19/18 A1c 6.2%     HSV acyclovir as needed     History thyroid cancer  8/07 s/p thyroidectomy 1.1 cm no evid of adenopathy sees   3/11 dr.abbott saunders note u/s thyroid in june, on levoxyl 100 mg  6/11 tsh 0.33, on levoxyl 100 mcg  1/12 tsh 3.9 on levoxyl 100 mcg followed by   8/12 dr.abbott saunders note, tsh 0.05,free t4 1.46, thyroglobulin <0.9, decrease levothyroxine to 100 mcg. Repeat thyroid ultrasound February 1/13 tsh 0.1, thyroglobulin less than 0.1; dr.abbott saunders note no changes  1/29/14 tsh 0.17,free t4 1.0,thyroglobulin <0.1 on levothyroxine 100 mcg  2/25/14 tsh 0.12, free t4, free t4 1.2,thyroglobulin 0.1 on levothyroxine 100 mcg  9/5/14 tsh 0.13,free t4 1.1 on levothyroxine 100 mcg one pill six days per week ,1/2 pill on day 7  9/20/14  endocrine note  1/6/15 tsh 1.5,free t4 0.99 on levothyroxine 100 mcg 6 days per week and 75 mcg one day per week  1/13/15  endocrine note  3/24/15 tsh 0.3,free t4 1.5 on levothyroxine 100 mcg 6 days per week  4/2/15  endocrine note continue same dose levothyroxine and follow up 6 months  9/30/15 tsh 0.09,free t4 1.3, thyroglobulin<0.1, antithyroglobulin antibody  less<0.9; on synthroid 100 mcg 6 days a week, synthroid 75 mcg one day per week  10/6/15  endocrinology note decrease total thyroid dose by 25 mcg during the week  5/17/16 tsh 0.6,free t4 0.9 on synthroid 100 mcg 6 days per week and 50 mcg 1 day per week? Per  endocrinology  5/15/17 tsh 1.1,free t4 1.1 on levothyroxine 100 mcg six days per week  5/18/17  endocrine note change to levothyroxine 75 mcg daily   11/27/17 tsh 0.15,free t4 1.26, thyroglbulin< 0.1, antithyroglobulin < 0.9  followed by  endocrinology  1/19/18 tsh 0.9 on levothyroxine per  endocrinology  4/17/18 tsh 0.7, free t4 1.2 on levothyroxine per  endocrinology  10/18/18 tsh 0.3,free t4 1.2,on levothyroxine 100 mcg six days and 1/2 tab day 7  10/24/18  endocrine note change levothyroxine to half a tablet twice a week, full tablet 5 days a week, recheck labs after  1/23/19 tsh 1.6,free t4 0.98, thyroglobulin less than 0.1, antithyroglobulin less than 0.9 per endocrinology     History depression  Since 2001, having failed celexa and effexor?, lexapro working well  4/10 samples cymbalta  6/11 off cymbalta  7/24/14 trial wellbutrin 150 mg, PHQ-9 17  8/19/14 behavioral health note; pt will follow up with psychotherapy  9/22/14 increase wellbutrin to 300 mgdepression  10/21/14 PHQ 14  10/21/14 decrease wellbutrin to 150 mg and add effexor XR 75 mg qday  1/25/15 off wellbutrin     History compression fracture  11/10/04 thoracic x-ray negative  3/4/14 cxr mid apex right lateral curvature thoracic spine may be positional or from mild scoliosis  3/7/16 cxr mid and upper thoracic spine compression fracture  9/5/17 dexa LS-0.7,hip-1.2     Dyslipidemia  2/09 chol 204,trig 130, hdl 62, ldl 116  5/10 chol 142,trig 63,hdl 64,ldl 65  6/11 chol 120,trig 65,hdl 68,ldl 39 on mevacor and niaspan  1/12 chol 98,trig 55,hdl 40,ldl 47 on mevacor and niaspan; ok to stop niaspan  2/13 chol 172,trig 314,hdl 45,ldl 64  on mevacor 40 mg; start fish oil 2 bid   3/15/14 chol 198,trig 101,hdl 54,ldl 124 on mevacor 40 mg and fish oil  7/29/14 dietary evaluation and education  3/25/15 chol 175,trig 107,hdl 54,ldl 100 on mevacor 40 mg  5/17/16 chol 196,trig 105,hdl 52,ldl 123 on mevacor 40 mg  9/5/17 chol 206,trig 139,hdl 48,ldl 130 on mevacor 40 mg  9/8/17 change to lipitor 20 mg and repeat labs in 4 weeks  11/1/17 chol 179,trig 168,hdl 44,ldl 101 on lipitor 20 mg  10/19/18 chol 188,trig 170,hdl 47,ldl 107 on lipitor 20 mg     cough  10/11/18 cough chronic in nature, question allergic rhinitis although treatment ineffective for preventing cough, tried prilosec over-the-counter no benefit  10/11/18 cxr negative  11/8/18 PFT FEV1 1.8 (87% predicted), FVC 2.5 (91% predicted), FEV/FVC 74%, no bronchodilator response, DLCO 120% predicted, normal pulmonary function testing   11/12/18 symptoms somewhat improved on prilosec 20 mg increased to 40 mg  1/6/20 trial protonix, high res ct ordered, referral allergy  6/17/20 CT high-resolution thorax no evidence of interstitial thickening or honeycombing, scattered cystic lucencies, consideration lymphocytic interstitial pneumonitis, emphysema, lymphangiomyomatosis, focal groundglass opacities upper lobes, coronary calcifications  7/8/20 pulmonary note start spiriva, repeat CT in 3 months  10/28/20  allergy note cough, proceed with aeroallergens skin testing, continue flonase, start sinus saline rinse, consider ENT or GI referral if allergy testing is negative, consider trial of singulair or atrovent nasal spray  11/3/20 pulmonary note chronic cough, suspect GERD, consider ENT assessment vocal cords, follow-up 6 months    Coronary artery calcification  6/17/20 CT high-resolution thorax,coronary calcifications    Allergic rhinitis  10/28/20  allergy note cough, proceed with aeroallergens skin testing, continue flonase, start sinus saline rinse, consider ENT or GI referral if allergy  testing is negative, consider trial of singulair or atrovent nasal spray  11/3/20  allergy note, aeroallergy skin test positive to trees, grasses, weeds, mold, cat, guinea pig, horse, rabbit, dust mite, recommend addition of astelin nasal spray to montelukast              Patient Active Problem List   Diagnosis   • History thyroid cancer   • Dyslipidemia   • History depression   • Tubular adenoma of colon   • Preventative health care   • Osteopenia   • Ocular migraine   • Cough   • History compression fracture of spine   • HSV infection   • Microscopic hematuria   • Impaired glucose metabolism   • Skin lesion   • Coronary artery calcification   • Pulmonary nodules   • Allergic rhinitis          Health Maintenance Summary                Annual Wellness Visit Next Due 1/6/2021      Done 1/6/2020 Visit Dx: Medicare annual wellness visit, subsequent     Patient has more history with this topic...    MAMMOGRAM Next Due 6/17/2021      Done 6/17/2020 MA-SCREENING MAMMO BILAT W/TOMOSYNTHESIS W/CAD     Patient has more history with this topic...    IMM DTaP/Tdap/Td Vaccine Next Due 1/19/2022      Done 1/19/2012 Imm Admin: Tdap Vaccine    COLONOSCOPY Next Due 10/20/2022      Done 10/20/2017 REFERRAL TO GI FOR COLONOSCOPY    BONE DENSITY Next Due 6/17/2025      Done 6/17/2020 DS-BONE DENSITY STUDY (DEXA)     Patient has more history with this topic...          Patient Care Team:  Asael Redd M.D. as PCP - General  Jeimy Huntley M.D. as Consulting Physician (Gynecology)  Carolyn Ventura M.D. as Consulting Physician (Endocrinology)  Mejia Saez O.D. as Consulting Physician (Optometry)  Charu Spann M.D. as Consulting Physician (Dermatology)  Jose Alejandro Godinez M.D. as Consulting Physician (Ophthalmology)  Viraj Rodriguez M.D. as Consulting Physician (Ophthalmology)  Fred Fuentes M.D. as Consulting Physician (Ophthalmology)      ROS       Objective:          Physical Exam  Vitals signs and nursing  note reviewed.   Constitutional:       Appearance: Normal appearance.   HENT:      Head: Normocephalic and atraumatic.      Nose: No congestion.   Eyes:      Conjunctiva/sclera: Conjunctivae normal.   Neck:      Musculoskeletal: Neck supple.   Cardiovascular:      Rate and Rhythm: Normal rate and regular rhythm.      Heart sounds: Normal heart sounds. No murmur.   Pulmonary:      Effort: No respiratory distress.      Breath sounds: Normal breath sounds.   Abdominal:      General: There is no distension.   Skin:     General: Skin is warm.   Neurological:      General: No focal deficit present.      Mental Status: She is alert.   Psychiatric:         Mood and Affect: Mood normal.         Behavior: Behavior normal.                 Assessment/Plan:        Assessment  #!  Chronic cough has seen pulmonary, and see neurology, pulmonary work-up as far as pulmonary function testing, CT thorax has been negative, consideration to gastroesophageal reflux, postnasal drip from allergies contributing to chronic cough, no asthma or COPD    #2 pulmonary nodules no tobacco 10/12/20 CT thorax stable 5 to 6 mm biapical groundglass nodules, no new nodules hyperinflated lungs, atherosclerosis with coronary calcification    #3 dyslipidemia on statin 6/11/20 chol 181,trig 137,hdl 49,ldl 105 on lipitor 20 mg    #4 coronary calcification by CT scan October 12, no chest pain    #5 allergic rhinitis now on Singulair and Astelin, has tried Flonase and Zyrtec previously    Plan  #! CT cardiac calcium score was stratification for CAD with dyslipidemia and atherosclerosis on CT    #2 follow up allergy continue Singulair and Astelin if no benefit with chronic cough after a month, consider PPI    #3 consider PPI, barium swallow    #4  ENT evaluation    #5  She is due for colonoscopy she declines at this point she would like to wait until Covid pandemic has subsided    #6 has had influenza vaccine    #7 follow-up 3 months

## 2020-11-12 ENCOUNTER — HOSPITAL ENCOUNTER (OUTPATIENT)
Dept: RADIOLOGY | Facility: MEDICAL CENTER | Age: 75
End: 2020-11-12
Attending: INTERNAL MEDICINE
Payer: COMMERCIAL

## 2020-11-12 ENCOUNTER — TELEPHONE (OUTPATIENT)
Dept: MEDICAL GROUP | Facility: MEDICAL CENTER | Age: 75
End: 2020-11-12

## 2020-11-12 DIAGNOSIS — I25.10 CORONARY ARTERY DISEASE INVOLVING NATIVE CORONARY ARTERY OF NATIVE HEART WITHOUT ANGINA PECTORIS: ICD-10-CM

## 2020-11-12 DIAGNOSIS — I25.84 CORONARY ARTERY CALCIFICATION: ICD-10-CM

## 2020-11-12 DIAGNOSIS — I25.10 CORONARY ARTERY CALCIFICATION: ICD-10-CM

## 2020-11-12 DIAGNOSIS — Z91.89 OTHER SPECIFIED PERSONAL RISK FACTORS, NOT ELSEWHERE CLASSIFIED: ICD-10-CM

## 2020-11-12 DIAGNOSIS — E78.5 DYSLIPIDEMIA: Chronic | ICD-10-CM

## 2020-11-12 PROCEDURE — 4410556 CT-CARDIAC SCORING (SELF PAY ONLY)

## 2020-11-12 RX ORDER — ATORVASTATIN CALCIUM 80 MG/1
80 TABLET, FILM COATED ORAL DAILY
Qty: 30 TAB | Refills: 6
Start: 2020-11-12 | End: 2020-12-07 | Stop reason: SDUPTHER

## 2020-11-13 NOTE — TELEPHONE ENCOUNTER
Notified with CT calcium score,, no chest pain, she is on Lipitor 20 mg, recommend restratification stress echo with dobutamine, she does not believe she is able to run on the treadmill, recommend cardiology evaluation she would like to wait until the stress test is completed, order for stress test placed, increase Lipitor to 80 mg daily since she just picked up a prescription of 20 mg tablets, we also considered changing to Crestor 20 mg but will go to Lipitor 80 mg monitor for muscle pain or muscle aches, will need to repeat her lipid panel in 4 weeks, she will let me know when she is ready for that, also continue to work on nutrition and exercise

## 2020-12-03 ENCOUNTER — HOSPITAL ENCOUNTER (OUTPATIENT)
Dept: LAB | Facility: MEDICAL CENTER | Age: 75
End: 2020-12-03
Attending: PHYSICIAN ASSISTANT
Payer: MEDICARE

## 2020-12-03 LAB
T4 FREE SERPL-MCNC: 1.3 NG/DL (ref 0.93–1.7)
TSH SERPL DL<=0.005 MIU/L-ACNC: 0.71 UIU/ML (ref 0.38–5.33)

## 2020-12-03 PROCEDURE — 84439 ASSAY OF FREE THYROXINE: CPT

## 2020-12-03 PROCEDURE — 84443 ASSAY THYROID STIM HORMONE: CPT

## 2020-12-03 PROCEDURE — 36415 COLL VENOUS BLD VENIPUNCTURE: CPT

## 2020-12-07 DIAGNOSIS — E78.5 DYSLIPIDEMIA: Chronic | ICD-10-CM

## 2020-12-07 RX ORDER — ATORVASTATIN CALCIUM 80 MG/1
80 TABLET, FILM COATED ORAL DAILY
Qty: 90 TAB | Refills: 3 | Status: SHIPPED | OUTPATIENT
Start: 2020-12-07 | End: 2021-11-15

## 2021-01-11 DIAGNOSIS — Z23 NEED FOR VACCINATION: ICD-10-CM

## 2021-01-22 ENCOUNTER — IMMUNIZATION (OUTPATIENT)
Dept: FAMILY PLANNING/WOMEN'S HEALTH CLINIC | Facility: IMMUNIZATION CENTER | Age: 76
End: 2021-01-22
Attending: INTERNAL MEDICINE
Payer: MEDICARE

## 2021-01-22 DIAGNOSIS — Z23 NEED FOR VACCINATION: ICD-10-CM

## 2021-01-22 DIAGNOSIS — Z23 ENCOUNTER FOR VACCINATION: Primary | ICD-10-CM

## 2021-01-22 PROCEDURE — 91300 PFIZER SARS-COV-2 VACCINE: CPT

## 2021-01-22 PROCEDURE — 0001A PFIZER SARS-COV-2 VACCINE: CPT

## 2021-02-08 ENCOUNTER — HOSPITAL ENCOUNTER (OUTPATIENT)
Dept: LAB | Facility: MEDICAL CENTER | Age: 76
End: 2021-02-08
Attending: INTERNAL MEDICINE
Payer: MEDICARE

## 2021-02-08 ENCOUNTER — TELEPHONE (OUTPATIENT)
Dept: MEDICAL GROUP | Facility: MEDICAL CENTER | Age: 76
End: 2021-02-08

## 2021-02-08 DIAGNOSIS — E78.5 DYSLIPIDEMIA: Chronic | ICD-10-CM

## 2021-02-08 LAB
CHOLEST SERPL-MCNC: 143 MG/DL (ref 100–199)
FASTING STATUS PATIENT QL REPORTED: NORMAL
HDLC SERPL-MCNC: 41 MG/DL
LDLC SERPL CALC-MCNC: 72 MG/DL
TRIGL SERPL-MCNC: 151 MG/DL (ref 0–149)

## 2021-02-08 PROCEDURE — 36415 COLL VENOUS BLD VENIPUNCTURE: CPT

## 2021-02-08 PROCEDURE — 80061 LIPID PANEL: CPT

## 2021-02-09 NOTE — TELEPHONE ENCOUNTER
Called patient and left a message, please notify that her cholesterol is improved her total cholesterol is now 143 which is down from 181, her bad cholesterol is currently 72 decreased from 105.  Have her continue on the current dose of her atorvastatin 80 mg.

## 2021-02-10 ENCOUNTER — TELEPHONE (OUTPATIENT)
Dept: MEDICAL GROUP | Facility: MEDICAL CENTER | Age: 76
End: 2021-02-10

## 2021-02-10 NOTE — TELEPHONE ENCOUNTER
----- Message from Asael Redd M.D. sent at 2/8/2021  7:04 PM PST -----  Called patient and left a message, please notify that her cholesterol is improved her total cholesterol is now 143 which is down from 181, her bad cholesterol is currently 72 decreased from 105.  Have her continue on the current dose of her atorvastatin 80 mg.

## 2021-02-12 ENCOUNTER — IMMUNIZATION (OUTPATIENT)
Dept: FAMILY PLANNING/WOMEN'S HEALTH CLINIC | Facility: IMMUNIZATION CENTER | Age: 76
End: 2021-02-12
Attending: INTERNAL MEDICINE
Payer: MEDICARE

## 2021-02-12 DIAGNOSIS — Z23 ENCOUNTER FOR VACCINATION: Primary | ICD-10-CM

## 2021-02-12 PROCEDURE — 0002A PFIZER SARS-COV-2 VACCINE: CPT

## 2021-02-12 PROCEDURE — 91300 PFIZER SARS-COV-2 VACCINE: CPT

## 2021-02-24 ENCOUNTER — APPOINTMENT (RX ONLY)
Dept: URBAN - METROPOLITAN AREA CLINIC 4 | Facility: CLINIC | Age: 76
Setting detail: DERMATOLOGY
End: 2021-02-24

## 2021-02-24 DIAGNOSIS — Z85.828 PERSONAL HISTORY OF OTHER MALIGNANT NEOPLASM OF SKIN: ICD-10-CM

## 2021-02-24 DIAGNOSIS — L57.0 ACTINIC KERATOSIS: ICD-10-CM

## 2021-02-24 DIAGNOSIS — L90.5 SCAR CONDITIONS AND FIBROSIS OF SKIN: ICD-10-CM

## 2021-02-24 DIAGNOSIS — D22 MELANOCYTIC NEVI: ICD-10-CM

## 2021-02-24 DIAGNOSIS — L72.8 OTHER FOLLICULAR CYSTS OF THE SKIN AND SUBCUTANEOUS TISSUE: ICD-10-CM

## 2021-02-24 DIAGNOSIS — L82.1 OTHER SEBORRHEIC KERATOSIS: ICD-10-CM

## 2021-02-24 DIAGNOSIS — L81.4 OTHER MELANIN HYPERPIGMENTATION: ICD-10-CM | Status: STABLE

## 2021-02-24 PROBLEM — D22.5 MELANOCYTIC NEVI OF TRUNK: Status: ACTIVE | Noted: 2021-02-24

## 2021-02-24 PROCEDURE — ? OBSERVATION

## 2021-02-24 PROCEDURE — ? LIQUID NITROGEN

## 2021-02-24 PROCEDURE — 17000 DESTRUCT PREMALG LESION: CPT

## 2021-02-24 PROCEDURE — ? ADDITIONAL NOTES

## 2021-02-24 PROCEDURE — ? COUNSELING

## 2021-02-24 PROCEDURE — ? DIAGNOSIS COMMENT

## 2021-02-24 PROCEDURE — 17003 DESTRUCT PREMALG LES 2-14: CPT

## 2021-02-24 PROCEDURE — ? OBSERVATION AND MEASURE

## 2021-02-24 PROCEDURE — 99213 OFFICE O/P EST LOW 20 MIN: CPT | Mod: 25

## 2021-02-24 ASSESSMENT — LOCATION SIMPLE DESCRIPTION DERM
LOCATION SIMPLE: POSTERIOR SCALP
LOCATION SIMPLE: RIGHT THIGH
LOCATION SIMPLE: RIGHT PRETIBIAL REGION
LOCATION SIMPLE: LEFT TEMPLE
LOCATION SIMPLE: RIGHT BUTTOCK
LOCATION SIMPLE: RIGHT HAND
LOCATION SIMPLE: RIGHT FOREARM
LOCATION SIMPLE: RIGHT ANKLE
LOCATION SIMPLE: LEFT UPPER BACK
LOCATION SIMPLE: LEFT ZYGOMA
LOCATION SIMPLE: LEFT BUTTOCK
LOCATION SIMPLE: RIGHT FOREHEAD
LOCATION SIMPLE: LEFT POSTERIOR UPPER ARM
LOCATION SIMPLE: LEFT FOREARM

## 2021-02-24 ASSESSMENT — LOCATION ZONE DERM
LOCATION ZONE: FACE
LOCATION ZONE: LEG
LOCATION ZONE: TRUNK
LOCATION ZONE: ARM
LOCATION ZONE: HAND
LOCATION ZONE: SCALP

## 2021-02-24 ASSESSMENT — LOCATION DETAILED DESCRIPTION DERM
LOCATION DETAILED: LEFT DISTAL DORSAL FOREARM
LOCATION DETAILED: RIGHT DISTAL PRETIBIAL REGION
LOCATION DETAILED: LEFT CENTRAL TEMPLE
LOCATION DETAILED: LEFT SUPERIOR UPPER BACK
LOCATION DETAILED: LEFT DISTAL POSTERIOR UPPER ARM
LOCATION DETAILED: LEFT DISTAL RADIAL DORSAL FOREARM
LOCATION DETAILED: RIGHT MEDIAL POSTERIOR ANKLE
LOCATION DETAILED: RIGHT BUTTOCK
LOCATION DETAILED: RIGHT ANTERIOR LATERAL DISTAL THIGH
LOCATION DETAILED: RIGHT DISTAL RADIAL DORSAL FOREARM
LOCATION DETAILED: LEFT BUTTOCK
LOCATION DETAILED: LEFT CENTRAL ZYGOMA
LOCATION DETAILED: MID-OCCIPITAL SCALP
LOCATION DETAILED: RIGHT RADIAL DORSAL HAND
LOCATION DETAILED: RIGHT FOREHEAD

## 2021-02-24 NOTE — PROCEDURE: DIAGNOSIS COMMENT
Detail Level: Detailed
Comment: Hx of BCC nodular type with margins uninvolved
Comment: History of skin cancers
Comment: See Photo from 09/12/2018

## 2021-02-24 NOTE — PROCEDURE: ADDITIONAL NOTES
Render Risk Assessment In Note?: no
Additional Notes: Pt states present for years; occasionally oozes.  Looks mildly inflamed today.  Advised to f/u if grows.
Detail Level: Detailed

## 2021-02-24 NOTE — PROCEDURE: OBSERVATION
Detail Level: Detailed
Size Of Lesion: 7 mm
Morphology Per Location (Optional): irregularly pigmented macule (see photo)
Size Of Lesion In Cm (Optional): 0

## 2021-02-24 NOTE — PROCEDURE: MIPS QUALITY
Quality 226: Preventive Care And Screening: Tobacco Use: Screening And Cessation Intervention: Patient screened for tobacco use and is an ex/non-smoker
Quality 111:Pneumonia Vaccination Status For Older Adults: Pneumococcal Vaccination Administered
Detail Level: Detailed
Quality 130: Documentation Of Current Medications In The Medical Record: Current Medications Documented

## 2021-03-05 DIAGNOSIS — K21.9 GASTROESOPHAGEAL REFLUX DISEASE WITHOUT ESOPHAGITIS: ICD-10-CM

## 2021-03-05 RX ORDER — PANTOPRAZOLE SODIUM 20 MG/1
TABLET, DELAYED RELEASE ORAL
Qty: 90 TABLET | Refills: 2 | Status: SHIPPED | OUTPATIENT
Start: 2021-03-05 | End: 2021-07-02

## 2021-03-15 ENCOUNTER — TELEPHONE (OUTPATIENT)
Dept: MEDICAL GROUP | Facility: MEDICAL CENTER | Age: 76
End: 2021-03-15

## 2021-03-15 ENCOUNTER — HOSPITAL ENCOUNTER (OUTPATIENT)
Dept: LAB | Facility: MEDICAL CENTER | Age: 76
End: 2021-03-15
Attending: INTERNAL MEDICINE
Payer: MEDICARE

## 2021-03-15 DIAGNOSIS — E78.5 DYSLIPIDEMIA: Chronic | ICD-10-CM

## 2021-03-15 LAB
ALBUMIN SERPL BCP-MCNC: 4 G/DL (ref 3.2–4.9)
ALP SERPL-CCNC: 109 U/L (ref 30–99)
ALT SERPL-CCNC: 15 U/L (ref 2–50)
AST SERPL-CCNC: 19 U/L (ref 12–45)
BILIRUB CONJ SERPL-MCNC: <0.2 MG/DL (ref 0.1–0.5)
BILIRUB INDIRECT SERPL-MCNC: ABNORMAL MG/DL (ref 0–1)
BILIRUB SERPL-MCNC: 0.7 MG/DL (ref 0.1–1.5)
PROT SERPL-MCNC: 6.5 G/DL (ref 6–8.2)

## 2021-03-15 PROCEDURE — 80076 HEPATIC FUNCTION PANEL: CPT

## 2021-03-15 PROCEDURE — 36415 COLL VENOUS BLD VENIPUNCTURE: CPT

## 2021-03-15 NOTE — TELEPHONE ENCOUNTER
ESTABLISHED PATIENT PRE-VISIT PLANNING     Patient was NOT contacted to complete PVP.     Note: Patient will not be contacted if there is no indication to call.     1.  Reviewed notes from the last few office visits within the medical group: Yes    2.  If any orders were placed at last visit or intended to be done for this visit (i.e. 6 mos follow-up), do we have Results/Consult Notes?         •  Labs - Labs ordered, completed on 03/15/2021 and results are in chart.  Note: If patient appointment is for lab review and patient did not complete labs, check with provider if OK to reschedule patient until labs completed.       •  Imaging - Imaging ordered, appointment scheduled.       •  Referrals - Referral ordered, patient has NOT been seen.    3. Is this appointment scheduled as a Hospital Follow-Up? No    4.  Immunizations were updated in Epic using Reconcile Outside Information activity? Yes    5.  Patient is due for the following Health Maintenance Topics:   Health Maintenance Due   Topic Date Due   • IMM INFLUENZA (1) 09/01/2020     6.  AHA (Pulse8) form printed for Provider? N/A         Outside information NOT reconciled using the Kimble feature. Per Sheila Gardner, the Kimble feature is down as of 02/09/2021 at 2:00pm. Will reconcile outside information at a later date.

## 2021-03-16 ENCOUNTER — HOSPITAL ENCOUNTER (OUTPATIENT)
Dept: CARDIOLOGY | Facility: MEDICAL CENTER | Age: 76
End: 2021-03-16
Attending: INTERNAL MEDICINE
Payer: MEDICARE

## 2021-03-16 ENCOUNTER — TELEPHONE (OUTPATIENT)
Dept: MEDICAL GROUP | Facility: MEDICAL CENTER | Age: 76
End: 2021-03-16

## 2021-03-16 DIAGNOSIS — E78.5 DYSLIPIDEMIA: Chronic | ICD-10-CM

## 2021-03-16 DIAGNOSIS — I25.10 CORONARY ARTERY CALCIFICATION: ICD-10-CM

## 2021-03-16 DIAGNOSIS — I25.84 CORONARY ARTERY CALCIFICATION: ICD-10-CM

## 2021-03-16 DIAGNOSIS — I25.10 CORONARY ARTERY DISEASE INVOLVING NATIVE CORONARY ARTERY OF NATIVE HEART WITHOUT ANGINA PECTORIS: ICD-10-CM

## 2021-03-16 LAB — LV EJECT FRACT  99904: 60

## 2021-03-16 PROCEDURE — 93350 STRESS TTE ONLY: CPT | Mod: 26 | Performed by: INTERNAL MEDICINE

## 2021-03-16 PROCEDURE — 93017 CV STRESS TEST TRACING ONLY: CPT

## 2021-03-16 PROCEDURE — 93018 CV STRESS TEST I&R ONLY: CPT | Performed by: INTERNAL MEDICINE

## 2021-03-16 NOTE — TELEPHONE ENCOUNTER
----- Message from Asael Redd M.D. sent at 3/15/2021 12:40 PM PDT -----  Please notify the patient that her liver enzymes on the atorvastatin cholesterol medication showed no significant liver inflammation.      She has a minimally elevated alkaline phosphatase level which sometimes can be caused by liver irritation but this is not significant and the more important liver enzymes are still normal.

## 2021-03-22 ENCOUNTER — OFFICE VISIT (OUTPATIENT)
Dept: MEDICAL GROUP | Facility: MEDICAL CENTER | Age: 76
End: 2021-03-22
Payer: MEDICARE

## 2021-03-22 VITALS
WEIGHT: 153 LBS | BODY MASS INDEX: 26.12 KG/M2 | HEIGHT: 64 IN | SYSTOLIC BLOOD PRESSURE: 134 MMHG | DIASTOLIC BLOOD PRESSURE: 68 MMHG | OXYGEN SATURATION: 95 % | TEMPERATURE: 97.6 F | HEART RATE: 82 BPM

## 2021-03-22 DIAGNOSIS — I25.84 CORONARY ARTERY CALCIFICATION: ICD-10-CM

## 2021-03-22 DIAGNOSIS — Z12.11 COLON CANCER SCREENING: ICD-10-CM

## 2021-03-22 DIAGNOSIS — K21.9 GASTROESOPHAGEAL REFLUX DISEASE, UNSPECIFIED WHETHER ESOPHAGITIS PRESENT: ICD-10-CM

## 2021-03-22 DIAGNOSIS — I25.10 CORONARY ARTERY DISEASE INVOLVING NATIVE HEART WITHOUT ANGINA PECTORIS, UNSPECIFIED VESSEL OR LESION TYPE: ICD-10-CM

## 2021-03-22 DIAGNOSIS — I65.29 ASYMPTOMATIC CAROTID ARTERY STENOSIS, UNSPECIFIED LATERALITY: ICD-10-CM

## 2021-03-22 DIAGNOSIS — I25.10 CORONARY ARTERY CALCIFICATION: ICD-10-CM

## 2021-03-22 DIAGNOSIS — E78.5 DYSLIPIDEMIA: ICD-10-CM

## 2021-03-22 PROCEDURE — 99213 OFFICE O/P EST LOW 20 MIN: CPT | Performed by: INTERNAL MEDICINE

## 2021-03-22 RX ORDER — AZELASTINE 1 MG/ML
1 SPRAY, METERED NASAL 2 TIMES DAILY
COMMUNITY

## 2021-03-22 ASSESSMENT — PATIENT HEALTH QUESTIONNAIRE - PHQ9: CLINICAL INTERPRETATION OF PHQ2 SCORE: 0

## 2021-03-22 ASSESSMENT — FIBROSIS 4 INDEX: FIB4 SCORE: 1.29

## 2021-03-22 NOTE — PROGRESS NOTES
Subjective:      Ping Das is a 75 y.o. female who presents with follow-up testing        HPI    Patient had CT calcium score in November of last year 1169.4, subsequent stress echo done March 16 - for ischemia, appropriate augmentation LV function after maximal exercise, increase Lipitor from 20 mg to 80 mg after CT calcium score and stress testing with most recent lipid panel 2/8/21 chol 143,trig 151,hdl 41,ldl 72 on lipitor 80 mg with no muscle pain. Has been walking three times per week, does 2 to 3 miles, no chest pain with activity, does have sweet tooth at times such as ice cream but has tried to cut back on carbonated beverages and chocolate since that may make reflux worse no history of diabetes or hypertension, tobacco  Medication, allergies, medical history, surgical history, social history, family history is reviewed and updated  Sees ENT  and allergy, does have allergies and sees  stopped singulair due to fatigue, cough has worsened slightly off that but still is 80% improved from baseline, still on astelin, flonase and atrovent. Still remains on the protonix qday in am no difficulty swallowing food.  Chronic cough component aggravated by allergies and reflux, improved with allergy medications and PPI once a day      Current Outpatient Medications   Medication Sig Dispense Refill   • pantoprazole (PROTONIX) 20 MG tablet TAKE 1 TABLET BY MOUTH EVERY DAY 90 tablet 2   • atorvastatin (LIPITOR) 80 MG tablet Take 1 Tab by mouth every day. 90 Tab 3   • Levocetirizine Dihydrochloride (XYZAL PO) Take  by mouth.     • potassium chloride SA (K-DUR) 10 MEQ Tab CR Take 1 Tab by mouth every day. 90 Tab 3   • fluticasone (FLONASE) 50 MCG/ACT nasal spray Spray 2 Sprays in nose every day. EACH NOSTRIL 48 g 3   • levothyroxine (SYNTHROID) 88 MCG Tab Take 88 mcg by mouth Every morning on an empty stomach.     • naproxen (NAPROSYN) 500 MG Tab Take 1 Tab by mouth 2 times a day as needed (pain). 180 Tab  1   • Multiple Vitamin (MULTI-DAY PO) Take  by mouth.     • Calcium Carbonate-Vitamin D (CALCIUM 600/VITAMIN D PO) Take  by mouth.     • cyanocobalamin (VITAMIN B-12) 500 MCG Tab Take 500 mcg by mouth every day.     • Cholecalciferol (VITAMIN D3 PO) Take  by mouth.     • Glucosamine HCl (GLUCOSAMINE PO) Take  by mouth.     • Omega-3 Fatty Acids (OMEGA 3 PO) Take  by mouth.     • acyclovir (ZOVIRAX) 400 MG tablet Take 400 mg by mouth 2 times a day.       No current facility-administered medications for this visit.         Tubular Adenoma  2004 no records path unknown  4/30/12 [facility] colon tubular adenoma, repeat 5 yrs  10/20/17 colon per GI 5 polyps pathology 4 polyps adenomatous and benign polypoid tissue, severe diverticulosis, recommend repeat colonoscopy 3 years     Thoracic compression fracture  11/10/04 thoracic x-ray negative  3/4/14 cxr mid apex right lateral curvature thoracic spine may be positional or from mild scoliosis  3/7/16 cxr mid and upper thoracic spine compression fracture    pulmonary nodules  6/17/20 CT high-resolution thorax no evidence of interstitial thickening or honeycombing, scattered cystic lucencies, consideration lymphocytic interstitial pneumonitis, emphysema, lymphangiomyomatosis, focal groundglass opacities upper lobes, coronary calcifications  10/12/20 CT thorax stable 5 to 6 mm biapical groundglass nodules, no new nodules hyperinflated lungs, atherosclerosis with coronary calcification     Preventative health  1/19/12 pneumovax  1/19/12 tdap  3/5/14 zoster vaccine  2/5/15 prevnar  10/20/17 colon per GI pathology 4 polyps adenomatous and benign polypoid tissue, severe diverticulosis, recommend repeat colonoscopy 3 years  11/14/18 sonocine negative  11/27/18 pap per gyn   4/7/19 YOALNDA negative, EKG sinus, ultrasound aorta mild atherosclerotic changes, ultrasound carotid mild left plaque bulb done at [facility]  6/11/20 hep c ab negative   6/11/20 vit d 44  6/18/20 dexa  LS-0.6,hip-1.4  6/18/20 mammogram heterogeneously dense breast tissue, offered screening breast ultrasound  2/5/20 shingrix second done  11/10/20 due for colon follow up she would like to defer     Osteopenia  10/08 dexa LS -1.0, hip -0.4  7/11 dexa LS -0.5,hip -1.2  2/13 vit d 50  3/15/14 vit d 31  3/25/15 vit d 33  6/12/15 dexa LS -0.6,hip -1.3;FRAX 10.5% major,hip 1.7%  3/7/16 cxr mid and upper thoracic spine compression fracture  5/17/16 vit d 29 take extra 2000 units daily  9/5/17 dexa LS-0.7,hip-1.2  9/5/17 vit d 42  6/11/20 vit d 44  6/18/20 dexa LS-0.6,hip-1.4     Ocular migraine     Microscopic hematuria  9/5/17 UA 2-5 RBC  9/7/17 urine culture negative, repeat UA 2 weeks, order in system  9/27/17 UA 2-5 RBC, will order us renal  10/4/17 ultrasound renal negative repeat urinalysis 2 weeks, if still positive will check CT urogram and urology evaluation  10/31/17 UA no RBC urine culture negative     Impaired glucose metabolism  10/19/18 A1c 6.2%  6/11/20 A1c 5.8%     HSV acyclovir as needed     History thyroid cancer  8/07 s/p thyroidectomy 1.1 cm no evid of adenopathy sees   3/11 dr.abbott saunders note u/s thyroid in june, on levoxyl 100 mg  6/11 tsh 0.33, on levoxyl 100 mcg  1/12 tsh 3.9 on levoxyl 100 mcg followed by   8/12 dr.abbott saunders note, tsh 0.05,free t4 1.46, thyroglobulin <0.9, decrease levothyroxine to 100 mcg. Repeat thyroid ultrasound February 1/13 tsh 0.1, thyroglobulin less than 0.1; dr.abbott saunders note no changes  1/29/14 tsh 0.17,free t4 1.0,thyroglobulin <0.1 on levothyroxine 100 mcg  2/25/14 tsh 0.12, free t4, free t4 1.2,thyroglobulin 0.1 on levothyroxine 100 mcg  9/5/14 tsh 0.13,free t4 1.1 on levothyroxine 100 mcg one pill six days per week ,1/2 pill on day 7  9/20/14  endocrine note  1/6/15 tsh 1.5,free t4 0.99 on levothyroxine 100 mcg 6 days per week and 75 mcg one day per week  1/13/15  endocrine note  3/24/15 tsh 0.3,free t4 1.5 on levothyroxine  100 mcg 6 days per week  4/2/15  endocrine note continue same dose levothyroxine and follow up 6 months  9/30/15 tsh 0.09,free t4 1.3, thyroglobulin<0.1, antithyroglobulin antibody less<0.9; on synthroid 100 mcg 6 days a week, synthroid 75 mcg one day per week  10/6/15  endocrinology note decrease total thyroid dose by 25 mcg during the week  5/17/16 tsh 0.6,free t4 0.9 on synthroid 100 mcg 6 days per week and 50 mcg 1 day per week? Per  endocrinology  5/15/17 tsh 1.1,free t4 1.1 on levothyroxine 100 mcg six days per week  5/18/17  endocrine note change to levothyroxine 75 mcg daily   11/27/17 tsh 0.15,free t4 1.26, thyroglbulin< 0.1, antithyroglobulin < 0.9  followed by  endocrinology  1/19/18 tsh 0.9 on levothyroxine per  endocrinology  4/17/18 tsh 0.7, free t4 1.2 on levothyroxine per  endocrinology  10/18/18 tsh 0.3,free t4 1.2,on levothyroxine 100 mcg six days and 1/2 tab day 7  10/24/18  endocrine note change levothyroxine to half a tablet twice a week, full tablet 5 days a week, recheck labs after  1/23/19 tsh 1.6,free t4 0.98, thyroglobulin less than 0.1, antithyroglobulin less than 0.9 per endocrinology  6/21/19 tsh 0.37, free t4 1.13 on levothyroxine 100 mcg  half a tablet twice a week, full tablet 5 days a week per  endocrinology  10/16/19 tsh 0.4, free t4 1.3    12/30/19 tsh 0.6,free t4 1.02 on levothyroxine 88 mcg six days per week and 1/2 tab one day per week per endocrine   1/6/20  endocrine note continue on levothyroxine 88 mcg six days per week and 1/2 tab one day per week  7/10/20 leonid kang endocrine note with , tsh 1.9, free t4 1.3, thyroglobulin<0.1, thyroglobulin antibody<0.9, on levothyroxine 88 mcg six days per week and 1/2 tab one day per week, change to levothyroxine 75 mcg daily  10/7/20 tsh 0.13,free t4 1.5 per endocrinology  6/11/20 thyroglobulin less than 0.1, thyroglobulin antibody  less than 0.9 on levothyroxine 88 mcg 6 days a week, levothyroxine 75 mcg 1 day a week  10/7/20 tsh 1.19, free t4 1.56 on levothyroxine 88 mcg 6 days a week, levothyroxine 75 mcg 1 day a week  10/25/20 leonid kang at  endocrine note on levothyroxine 88 mcg 6 days a week, levothyroxine 75 mcg 1 day a week  12/8/20 leonid kang at  endocrine note tsh 0.71,free t4 1.3,  on levothyroxine 88 mcg 6 days a week, and levothyroxine 125 mcg 1/2 tab qday      History depression  Since 2001, having failed celexa and effexor?, lexapro working well  4/10 samples cymbalta  6/11 off cymbalta  7/24/14 trial wellbutrin 150 mg, PHQ-9 17  8/19/14 behavioral health note; pt will follow up with psychotherapy  9/22/14 increase wellbutrin to 300 mgdepression  10/21/14 PHQ 14  10/21/14 decrease wellbutrin to 150 mg and add effexor XR 75 mg qday  1/25/15 off wellbutrin     History compression fracture  11/10/04 thoracic x-ray negative  3/4/14 cxr mid apex right lateral curvature thoracic spine may be positional or from mild scoliosis  3/7/16 cxr mid and upper thoracic spine compression fracture  9/5/17 dexa LS-0.7,hip-1.2     Dyslipidemia  2/09 chol 204,trig 130, hdl 62, ldl 116  5/10 chol 142,trig 63,hdl 64,ldl 65  6/11 chol 120,trig 65,hdl 68,ldl 39 on mevacor and niaspan  1/12 chol 98,trig 55,hdl 40,ldl 47 on mevacor and niaspan; ok to stop niaspan  2/13 chol 172,trig 314,hdl 45,ldl 64 on mevacor 40 mg; start fish oil 2 bid   3/15/14 chol 198,trig 101,hdl 54,ldl 124 on mevacor 40 mg and fish oil  7/29/14 dietary evaluation and education  3/25/15 chol 175,trig 107,hdl 54,ldl 100 on mevacor 40 mg  5/17/16 chol 196,trig 105,hdl 52,ldl 123 on mevacor 40 mg  9/5/17 chol 206,trig 139,hdl 48,ldl 130 on mevacor 40 mg  9/8/17 change to lipitor 20 mg and repeat labs in 4 weeks  11/1/17 chol 179,trig 168,hdl 44,ldl 101 on lipitor 20 mg  10/19/18 chol 188,trig 170,hdl 47,ldl 107 on lipitor 20 mg  6/11/20 chol 181,trig 137,hdl  49,ldl 105 on lipitor 20 mg  10/12/20 CT thorax atherosclerosis with coronary calcification  11/12/20 CT coronary calcium score: LMA 0.0, LCx 211.5, .3, .5, .2 = 1169.4, probable dilation left ventricle and left atrium, linear density lingula and left lower lobe consistent with scar or atelectasis  11/12/20 increase lipitor to 80 mg repeat labs 4 weeks, stress echo ordered  2/8/21 chol 143,trig 151,hdl 41,ldl 72 on lipitor 80 mg  3/16/21 stress echo negative for ischemia, appropriate augmentation LV function after maximal exercise, EF 60%     cough  10/11/18 cough chronic in nature, question allergic rhinitis although treatment ineffective for preventing cough, tried prilosec over-the-counter no benefit  10/11/18 cxr negative  11/8/18 PFT FEV1 1.8 (87% predicted), FVC 2.5 (91% predicted), FEV/FVC 74%, no bronchodilator response, DLCO 120% predicted, normal pulmonary function testing   11/12/18 symptoms somewhat improved on prilosec 20 mg increased to 40 mg  1/6/20 trial protonix, CT high resolution ordered, referral allergy  6/17/20 CT high-resolution thorax no evidence of interstitial thickening or honeycombing, scattered cystic lucencies, consideration lymphocytic interstitial pneumonitis, emphysema, lymphangiomyomatosis, focal groundglass opacities upper lobes, coronary calcifications  7/8/20 pulmonary note start spiriva, repeat CT in 3 months  10/12/20 CT thorax stable 5 to 6 mm biapical groundglass nodules, no new nodules hyperinflated lungs, atherosclerosis with coronary calcification  10/28/20  allergy note cough, proceed with aeroallergens skin testing, continue flonase, start sinus saline rinse, consider ENT or GI referral if allergy testing is negative, consider trial of singulair or atrovent nasal spray  11/3/20 pulmonary note chronic cough, off spiriva, suspect GERD, consider ENT assessment vocal cords, follow-up 6 months  2/16/21  allergy note cough, positive  aeroallergens skin test on 11/3/20 positive to trees, grasses, weeds, one mold, cat, guinea pig, horse, rabbit and dust mite, continue xyxal, astelin, atrovent nasal, flonase, trial of stopping montelukast to see if fatigue improves, possible ENT evaluation chronic sinusitis, continue pantoprazole  3/10/21  ENT note flexible laryngoscopy, right nasal septal deviation, interarytenoid edema, cough is better with aggressive nasal regimen and PPI, follow-up GI for dysphagia, will obtain CT sinus, continue rinses bid, PPI before dinner, stop astelin     Coronary artery calcification  6/17/20 CT high-resolution thorax,coronary calcifications  10/12/20 CT thorax atherosclerosis with coronary calcification  11/12/20 CT coronary calcium score: LMA 0.0, LCx 211.5, .3, .5, .2 = 1169.4, probable dilation left ventricle and left atrium, linear density lingula and left lower lobe consistent with scar or atelectasis  3/16/21 stress echo negative for ischemia, appropriate augmentation LV function after maximal exercise, EF 60%     Allergic rhinitis  10/28/20  allergy note cough, proceed with aeroallergens skin testing, continue flonase, start sinus saline rinse, consider ENT or GI referral if allergy testing is negative, consider trial of singulair or atrovent nasal spray  11/3/20  allergy note, aeroallergy skin test positive to trees, grasses, weeds, mold, cat, guinea pig, horse, rabbit, dust mite, recommend addition of astelin nasal spray to montelukast  2/16/21  allergy note cough, positive aeroallergens skin test on 11/3/20 positive to trees, grasses, weeds, one mold, cat, guinea pig, horse, rabbit and dust mite, continue xyxal, astelin, atrovent nasal, flonase, trial of stopping montelukast to see if fatigue improves, possible ENT evaluation chronic sinusitis, continue pantoprazole  3/10/21  ENT note flexible laryngoscopy, right nasal septal deviation, interarytenoid  edema, cough is better with aggressive nasal regimen and PPI, follow-up GI for dysphagia, will obtain CT sinus, continue rinses bid, PPI before dinner, stop astelin           Patient Active Problem List   Diagnosis   • History of thyroid cancer   • Dyslipidemia   • History of depression   • Tubular adenoma of colon   • Preventative health care   • Osteopenia   • Ocular migraine   • Cough   • History of compression fracture of spine   • History of HSV infection   • Microscopic hematuria   • Impaired glucose metabolism   • Skin lesion   • Coronary artery calcification   • Pulmonary nodules   • Allergic rhinitis       Depression Screening    Little interest or pleasure in doing things?  0 - not at all  Feeling down, depressed , or hopeless? 0 - not at all  Patient Health Questionnaire Score: 0            Health Maintenance Summary                IMM INFLUENZA Overdue 9/1/2020      Done 10/11/2019 Imm Admin: Influenza Vaccine Adult HD     Patient has more history with this topic...    MAMMOGRAM Next Due 6/17/2021      Done 6/17/2020 MA-SCREENING MAMMO BILAT W/TOMOSYNTHESIS W/CAD     Patient has more history with this topic...    IMM DTaP/Tdap/Td Vaccine Next Due 1/19/2022      Done 1/19/2012 Imm Admin: Tdap Vaccine    COLONOSCOPY Next Due 10/20/2022      Done 10/20/2017 REFERRAL TO GI FOR COLONOSCOPY    BONE DENSITY Next Due 6/17/2025      Done 6/17/2020 DS-BONE DENSITY STUDY (DEXA)     Patient has more history with this topic...          Patient Care Team:  Asael Redd M.D. as PCP - General  Jeimy Huntley M.D. as Consulting Physician (Gynecology)  Carolyn Ventura M.D. as Consulting Physician (Endocrinology)  Mejia Saez O.D. as Consulting Physician (Optometry)  Charu Spann M.D. as Consulting Physician (Dermatology)  Jose Alejandro Godinez M.D. as Consulting Physician (Ophthalmology)  Viraj Rodriguez M.D. as Consulting Physician (Ophthalmology)  Fred Fuentes M.D. as Consulting Physician  (Ophthalmology)      ROS       Objective:          Physical Exam  Vitals and nursing note reviewed.   Constitutional:       Appearance: Normal appearance.   HENT:      Head: Normocephalic and atraumatic.      Left Ear: External ear normal.   Eyes:      Conjunctiva/sclera: Conjunctivae normal.   Cardiovascular:      Rate and Rhythm: Normal rate and regular rhythm.   Pulmonary:      Effort: Pulmonary effort is normal.      Breath sounds: Normal breath sounds.   Abdominal:      General: There is no distension.   Musculoskeletal:      Cervical back: Neck supple.   Skin:     General: Skin is warm.   Neurological:      General: No focal deficit present.      Mental Status: She is alert.   Psychiatric:         Mood and Affect: Mood normal.         Behavior: Behavior normal.     No carotid bruits  Extremities no edema            Assessment/Plan:        Assessment  #1 CAD asymptomatic with CT calcium score 11/12/20 CT coronary calcium score: LMA 0.0, LCx 211.5, .3, .5, .2 = 1169.4, probable dilation left ventricle and left atrium, linear density lingula and left lower lobe consistent with scar or atelectasis and subsequent stress test on 3/16/21 stress echo negative for ischemia, appropriate augmentation LV function after maximal exercise, EF 60%, risk factors dyslipidemia, no hypertension, no diabetes, no tobacco    #2 dyslipidemia 2/8/21 chol 143,trig 151,hdl 41,ldl 72 on lipitor 80 mg    #3 chronic cough related to allergies and reflux, followed by allergy and ENT    #4 allergic rhinitis on Atrovent, Flonase, Astelin, Xyzal, intolerant of Singulair    #5 laryngal pharyngeal reflux?  Remains on PPI once a day in a.m.    #6 pulmonary nodules 10/12/20 CT thorax stable 5 to 6 mm biapical groundglass nodules, no new nodules hyperinflated lungs, atherosclerosis with coronary calcification    #7 carotid stenosis question CAD    #8 tubular adenoma 2017, due for follow-up colon cancer screening      Plan  #1  follow up [facility] colon cancer screening with tubular adenoma 2017 follow-up at that time recommended for 3 years    #2 also evaluate for gerd and follow-up with GI to evaluate whether EGD would be beneficial    #3 continue PPI once a day in the morning, take Protonix for 30 minutes before breakfast    #4 continue Lipitor 80 mg    #5 increase exercise to 30 minutes daily    #6 minimize sweets, candies, processed foods    #7 follow-up with allergy potential candidate for allergy shots    #8 follow-up with ENT    #9 ultrasound carotid bilateral carotid stenosis evaluation with underlying CAD, and dyslipidemia    #10 repeat CT thorax end of the year  #10 follow-up 6 to 12 months    #11 has had Covid vaccine

## 2021-04-12 ENCOUNTER — HOSPITAL ENCOUNTER (OUTPATIENT)
Dept: LAB | Facility: MEDICAL CENTER | Age: 76
End: 2021-04-12
Attending: PHYSICIAN ASSISTANT
Payer: MEDICARE

## 2021-04-12 LAB
T4 FREE SERPL-MCNC: 1.5 NG/DL (ref 0.93–1.7)
TSH SERPL DL<=0.005 MIU/L-ACNC: 0.5 UIU/ML (ref 0.38–5.33)

## 2021-04-12 PROCEDURE — 84439 ASSAY OF FREE THYROXINE: CPT

## 2021-04-12 PROCEDURE — 84443 ASSAY THYROID STIM HORMONE: CPT

## 2021-04-12 PROCEDURE — 36415 COLL VENOUS BLD VENIPUNCTURE: CPT

## 2021-04-13 ENCOUNTER — HOSPITAL ENCOUNTER (OUTPATIENT)
Dept: RADIOLOGY | Facility: MEDICAL CENTER | Age: 76
End: 2021-04-13
Attending: OTOLARYNGOLOGY
Payer: MEDICARE

## 2021-04-13 ENCOUNTER — HOSPITAL ENCOUNTER (OUTPATIENT)
Dept: RADIOLOGY | Facility: MEDICAL CENTER | Age: 76
End: 2021-04-13
Attending: INTERNAL MEDICINE
Payer: MEDICARE

## 2021-04-13 ENCOUNTER — TELEPHONE (OUTPATIENT)
Dept: MEDICAL GROUP | Facility: MEDICAL CENTER | Age: 76
End: 2021-04-13

## 2021-04-13 DIAGNOSIS — R49.8 NEUROLOGIC VOICE DISORDER: ICD-10-CM

## 2021-04-13 DIAGNOSIS — J31.0 CHRONIC RHINITIS: ICD-10-CM

## 2021-04-13 DIAGNOSIS — I65.29 ASYMPTOMATIC CAROTID ARTERY STENOSIS, UNSPECIFIED LATERALITY: ICD-10-CM

## 2021-04-13 DIAGNOSIS — R05.9 COUGH: ICD-10-CM

## 2021-04-13 DIAGNOSIS — J34.2 DEVIATED NASAL SEPTUM: ICD-10-CM

## 2021-04-13 DIAGNOSIS — K21.9 GASTROESOPHAGEAL REFLUX DISEASE, UNSPECIFIED WHETHER ESOPHAGITIS PRESENT: ICD-10-CM

## 2021-04-13 PROCEDURE — 93880 EXTRACRANIAL BILAT STUDY: CPT

## 2021-04-13 PROCEDURE — 70486 CT MAXILLOFACIAL W/O DYE: CPT | Mod: MH

## 2021-04-19 RX ORDER — LEVOTHYROXINE SODIUM 0.12 MG/1
TABLET ORAL
Qty: 1 TABLET | Refills: 0
Start: 2021-04-19 | End: 2022-11-10

## 2021-04-19 RX ORDER — LEVOTHYROXINE SODIUM 88 UG/1
TABLET ORAL
Qty: 1 TABLET | Refills: 0
Start: 2021-04-19 | End: 2023-04-14

## 2021-05-27 ENCOUNTER — HOSPITAL ENCOUNTER (OUTPATIENT)
Dept: RADIOLOGY | Facility: MEDICAL CENTER | Age: 76
End: 2021-05-27
Attending: INTERNAL MEDICINE
Payer: MEDICARE

## 2021-05-27 DIAGNOSIS — R91.8 PULMONARY NODULES: ICD-10-CM

## 2021-05-27 PROCEDURE — 71250 CT THORAX DX C-: CPT | Mod: MG

## 2021-07-02 ENCOUNTER — OFFICE VISIT (OUTPATIENT)
Dept: SLEEP MEDICINE | Facility: MEDICAL CENTER | Age: 76
End: 2021-07-02
Payer: MEDICARE

## 2021-07-02 VITALS
HEART RATE: 67 BPM | TEMPERATURE: 98.1 F | OXYGEN SATURATION: 93 % | SYSTOLIC BLOOD PRESSURE: 114 MMHG | BODY MASS INDEX: 25.61 KG/M2 | WEIGHT: 150 LBS | DIASTOLIC BLOOD PRESSURE: 44 MMHG | HEIGHT: 64 IN | RESPIRATION RATE: 16 BRPM

## 2021-07-02 DIAGNOSIS — R91.8 PULMONARY NODULES: ICD-10-CM

## 2021-07-02 DIAGNOSIS — R05.9 COUGH: ICD-10-CM

## 2021-07-02 PROCEDURE — 99214 OFFICE O/P EST MOD 30 MIN: CPT | Performed by: INTERNAL MEDICINE

## 2021-07-02 RX ORDER — IPRATROPIUM BROMIDE 21 UG/1
SPRAY, METERED NASAL
COMMUNITY
Start: 2021-04-26

## 2021-07-02 RX ORDER — INHALER,ASSIST DEVICE,MED MASK
1 SPACER (EA) MISCELLANEOUS ONCE
OUTPATIENT
Start: 2021-07-02 | End: 2021-07-05

## 2021-07-02 RX ORDER — ALBUTEROL SULFATE 90 UG/1
1-2 AEROSOL, METERED RESPIRATORY (INHALATION) EVERY 4 HOURS PRN
Qty: 1 EACH | Refills: 3 | Status: SHIPPED | OUTPATIENT
Start: 2021-07-02 | End: 2023-02-22

## 2021-07-02 ASSESSMENT — ENCOUNTER SYMPTOMS
CHILLS: 0
BLURRED VISION: 0
FALLS: 0
HEARTBURN: 0
FEVER: 0
SORE THROAT: 0
DIAPHORESIS: 0
CONSTIPATION: 0
EYE PAIN: 0
FOCAL WEAKNESS: 0
DOUBLE VISION: 0
TREMORS: 0
EYE DISCHARGE: 0
WEAKNESS: 0
SPUTUM PRODUCTION: 0
WHEEZING: 0
DIZZINESS: 0
BACK PAIN: 0
MYALGIAS: 0
SPEECH CHANGE: 0
HEMOPTYSIS: 0
SHORTNESS OF BREATH: 0
STRIDOR: 0
DEPRESSION: 0
COUGH: 1
PND: 0
NECK PAIN: 0
PALPITATIONS: 0
EYE REDNESS: 0
ORTHOPNEA: 0
PHOTOPHOBIA: 0
CLAUDICATION: 0
DIARRHEA: 0
WEIGHT LOSS: 0
NAUSEA: 0
ABDOMINAL PAIN: 0
SINUS PAIN: 0
HEADACHES: 0
VOMITING: 0

## 2021-07-02 ASSESSMENT — FIBROSIS 4 INDEX: FIB4 SCORE: 1.29

## 2021-07-02 NOTE — PROGRESS NOTES
"Chief Complaint   Patient presents with   • Cough     last seen 11/3/2020 Dr. Castorena     • Results     CT Chest thorax 5/27/2021          HPI: This patient is a 75 y.o. female whom is followed in our clinic for pulmonary nodules and chronic cough last seen by Dr. Castorena on 11/3/2020.  Patient is a lifelong non-smoker who was referred initially for cough present per patient at least 10 years.  Cough is dry with no clear recurring factors.  She has been seen by ENT, allergy and GI.  She is scheduled to undergo endoscopy in August.  She has been trialed on Incruse, Spiriva and Trelegy none of which were successful in reducing cough severity or frequency.  The only therapy that has made significant improvement in her symptoms are intranasal steroids and montelukast.  Unfortunately she was unable to tolerate montelukast due to fatigue and depression.  Pulmonary function testing from November 2018 showed normal airflows with normal lung volumes and normal DLCO.  As part of her work-up she had a CT chest which showed subcentimeter pulmonary nodules up to 6 mm in size with groundglass nature and mild biapical scarring.  She did have some minimal emphysematous changes.  Likely age-related.  Follow-up CT from May 27 showed stable groundglass opacities with no new nodules or lymphadenopathy.    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   • Preventative health care 5/15/2009   • Shortness of breath     when walking   • Unspecified urinary incontinence        Social History     Socioeconomic History   • Marital status:      Spouse name: Not on file   • Number of children: Not on file   • Years of " education: Not on file   • Highest education level: Not on file   Occupational History   • Not on file   Tobacco Use   • Smoking status: Passive Smoke Exposure - Never Smoker   • Smokeless tobacco: Never Used   • Tobacco comment: Parents smoked/ smoked/worked in casinos   Vaping Use   • Vaping Use: Never used   Substance and Sexual Activity   • Alcohol use: Yes     Alcohol/week: 0.6 oz     Types: 1 Glasses of wine per week     Comment: occasional   • Drug use: No   • Sexual activity: Never   Other Topics Concern   • Not on file   Social History Narrative   • Not on file     Social Determinants of Health     Financial Resource Strain:    • Difficulty of Paying Living Expenses:    Food Insecurity:    • Worried About Running Out of Food in the Last Year:    • Ran Out of Food in the Last Year:    Transportation Needs:    • Lack of Transportation (Medical):    • Lack of Transportation (Non-Medical):    Physical Activity:    • Days of Exercise per Week:    • Minutes of Exercise per Session:    Stress:    • Feeling of Stress :    Social Connections:    • Frequency of Communication with Friends and Family:    • Frequency of Social Gatherings with Friends and Family:    • Attends Yazidism Services:    • Active Member of Clubs or Organizations:    • Attends Club or Organization Meetings:    • Marital Status:    Intimate Partner Violence:    • Fear of Current or Ex-Partner:    • Emotionally Abused:    • Physically Abused:    • Sexually Abused:        Family History   Problem Relation Age of Onset   • Cancer Mother    • Heart Disease Father    • Heart Disease Sister    • Cancer Daughter        Current Outpatient Medications on File Prior to Visit   Medication Sig Dispense Refill   • ipratropium (ATROVENT) 0.03 % Solution PUT 1 TO 2 SPRAYS INTO EACH NOSTRIL ONE TO TWO TIMES DAILY     • Calcium Polycarbophil (FIBER-CAPS PO) Take  by mouth.     • BIOTIN PO Take  by mouth.     • levothyroxine (SYNTHROID) 88 MCG Tab One po  six days per week from  1 tablet 0   • levothyroxine (SYNTHROID) 125 MCG Tab One po one day per week per  (Patient taking differently: Half a tab once a week) 1 tablet 0   • azelastine (ASTELIN) 137 MCG/SPRAY nasal spray Administer 1 Spray into affected nostril(S) 2 times a day.     • atorvastatin (LIPITOR) 80 MG tablet Take 1 Tab by mouth every day. 90 Tab 3   • Levocetirizine Dihydrochloride (XYZAL PO) Take  by mouth.     • potassium chloride SA (K-DUR) 10 MEQ Tab CR Take 1 Tab by mouth every day. 90 Tab 3   • fluticasone (FLONASE) 50 MCG/ACT nasal spray Spray 2 Sprays in nose every day. EACH NOSTRIL 48 g 3   • naproxen (NAPROSYN) 500 MG Tab Take 1 Tab by mouth 2 times a day as needed (pain). 180 Tab 1   • Multiple Vitamin (MULTI-DAY PO) Take  by mouth.     • Calcium Carbonate-Vitamin D (CALCIUM 600/VITAMIN D PO) Take  by mouth. Three times a week     • cyanocobalamin (VITAMIN B-12) 500 MCG Tab Take 500 mcg by mouth every day.     • Cholecalciferol (VITAMIN D3 PO) Take  by mouth.     • Glucosamine HCl (GLUCOSAMINE PO) Take  by mouth.     • Omega-3 Fatty Acids (OMEGA 3 PO) Take  by mouth.     • acyclovir (ZOVIRAX) 400 MG tablet Take 400 mg by mouth 2 times a day.     • pantoprazole (PROTONIX) 20 MG tablet TAKE 1 TABLET BY MOUTH EVERY DAY (Patient not taking: Reported on 7/2/2021) 90 tablet 2     No current facility-administered medications on file prior to visit.       Kenalog      ROS:   Review of Systems   Constitutional: Negative for chills, diaphoresis, fever, malaise/fatigue and weight loss.   HENT: Negative for congestion, ear discharge, ear pain, hearing loss, nosebleeds, sinus pain, sore throat and tinnitus.    Eyes: Negative for blurred vision, double vision, photophobia, pain, discharge and redness.   Respiratory: Positive for cough. Negative for hemoptysis, sputum production, shortness of breath, wheezing and stridor.    Cardiovascular: Negative for chest pain, palpitations, orthopnea,  "claudication, leg swelling and PND.   Gastrointestinal: Negative for abdominal pain, constipation, diarrhea, heartburn, nausea and vomiting.   Genitourinary: Negative for dysuria and urgency.   Musculoskeletal: Negative for back pain, falls, joint pain, myalgias and neck pain.   Skin: Negative for itching and rash.   Neurological: Negative for dizziness, tremors, speech change, focal weakness, weakness and headaches.   Endo/Heme/Allergies: Negative for environmental allergies.   Psychiatric/Behavioral: Negative for depression.       /44 (BP Location: Right arm, Patient Position: Sitting, BP Cuff Size: Adult)   Pulse 67   Temp 36.7 °C (98.1 °F) (Temporal)   Resp 16   Ht 1.626 m (5' 4\")   Wt 68 kg (150 lb)   SpO2 93%   Physical Exam  Vitals reviewed.   Constitutional:       General: She is not in acute distress.     Appearance: Normal appearance. She is well-developed and normal weight.   HENT:      Head: Normocephalic and atraumatic.      Right Ear: External ear normal.      Left Ear: External ear normal.      Nose: Nose normal. No congestion.      Mouth/Throat:      Mouth: Mucous membranes are moist.      Pharynx: Oropharynx is clear. No oropharyngeal exudate.   Eyes:      General: No scleral icterus.     Extraocular Movements: Extraocular movements intact.      Conjunctiva/sclera: Conjunctivae normal.      Pupils: Pupils are equal, round, and reactive to light.   Neck:      Vascular: No JVD.      Trachea: No tracheal deviation.   Cardiovascular:      Rate and Rhythm: Normal rate and regular rhythm.      Heart sounds: Normal heart sounds. No murmur heard.   No friction rub. No gallop.    Pulmonary:      Effort: Pulmonary effort is normal. No accessory muscle usage or respiratory distress.      Breath sounds: Normal breath sounds. No wheezing or rales.      Comments: Patient has dry cough during my exam  Abdominal:      General: There is no distension.      Palpations: Abdomen is soft.      Tenderness: " There is no abdominal tenderness.   Musculoskeletal:         General: No tenderness or deformity. Normal range of motion.      Cervical back: Normal range of motion and neck supple.      Right lower leg: No edema.      Left lower leg: No edema.   Lymphadenopathy:      Cervical: No cervical adenopathy.   Skin:     General: Skin is warm and dry.      Findings: No rash.      Nails: There is no clubbing.   Neurological:      Mental Status: She is alert and oriented to person, place, and time.      Cranial Nerves: No cranial nerve deficit.      Gait: Gait normal.   Psychiatric:         Mood and Affect: Mood normal.         Behavior: Behavior normal.         PFTs as reviewed by me personally: As per HPI    Imaging as reviewed by me personally: As per HPI    Assessment:  1. Cough  albuterol 108 (90 Base) MCG/ACT Aero Soln inhalation aerosol    AEROCHAMBER PLUS-MEDIUM MASK MISC 1 Each   2. Pulmonary nodules  CT-CHEST (THORAX) W/O       Plan:  1.  Chronic and stable.  Symptoms did improve significantly with treatment for allergies, specifically montelukast.  Unfortunately the patient was not able to tolerate this medication.  She is followed by allergy and does get some relief with intranasal therapy.  She will continue this.  I did, in the meantime give her albuterol as she has not tried short acting bronchodilators as needed.  She will use this with a spacer prior to going out to try to prevent cough to see if it provides her with any temporary relief.  2.  These are stable since June 2020.  We will repeat CT in 1 year and if stable see surveillance imaging.  Return in about 6 months (around 1/2/2022) for cough, lung nodules.

## 2021-08-04 DIAGNOSIS — E87.6 LOW BLOOD POTASSIUM: ICD-10-CM

## 2021-08-04 RX ORDER — CHOLECALCIFEROL (VITAMIN D3) 125 MCG
CAPSULE ORAL
COMMUNITY

## 2021-08-04 RX ORDER — POTASSIUM CHLORIDE 750 MG/1
TABLET, EXTENDED RELEASE ORAL
Qty: 90 TABLET | Refills: 1 | Status: SHIPPED | OUTPATIENT
Start: 2021-08-04 | End: 2022-02-25 | Stop reason: SDUPTHER

## 2021-08-25 PROBLEM — K22.2 SCHATZKI'S RING: Status: ACTIVE | Noted: 2021-08-25

## 2021-09-15 ENCOUNTER — APPOINTMENT (RX ONLY)
Dept: URBAN - METROPOLITAN AREA CLINIC 4 | Facility: CLINIC | Age: 76
Setting detail: DERMATOLOGY
End: 2021-09-15

## 2021-09-15 DIAGNOSIS — Z85.828 PERSONAL HISTORY OF OTHER MALIGNANT NEOPLASM OF SKIN: ICD-10-CM

## 2021-09-15 DIAGNOSIS — L81.4 OTHER MELANIN HYPERPIGMENTATION: ICD-10-CM | Status: STABLE

## 2021-09-15 DIAGNOSIS — L82.1 OTHER SEBORRHEIC KERATOSIS: ICD-10-CM

## 2021-09-15 DIAGNOSIS — L90.5 SCAR CONDITIONS AND FIBROSIS OF SKIN: ICD-10-CM

## 2021-09-15 DIAGNOSIS — D22 MELANOCYTIC NEVI: ICD-10-CM

## 2021-09-15 PROBLEM — D22.5 MELANOCYTIC NEVI OF TRUNK: Status: ACTIVE | Noted: 2021-09-15

## 2021-09-15 PROBLEM — D48.5 NEOPLASM OF UNCERTAIN BEHAVIOR OF SKIN: Status: ACTIVE | Noted: 2021-09-15

## 2021-09-15 PROCEDURE — 11102 TANGNTL BX SKIN SINGLE LES: CPT

## 2021-09-15 PROCEDURE — 99213 OFFICE O/P EST LOW 20 MIN: CPT | Mod: 25

## 2021-09-15 PROCEDURE — ? COUNSELING

## 2021-09-15 PROCEDURE — ? BIOPSY BY SHAVE METHOD

## 2021-09-15 PROCEDURE — ? OBSERVATION AND MEASURE

## 2021-09-15 PROCEDURE — ? DIAGNOSIS COMMENT

## 2021-09-15 PROCEDURE — 11103 TANGNTL BX SKIN EA SEP/ADDL: CPT

## 2021-09-15 ASSESSMENT — LOCATION ZONE DERM
LOCATION ZONE: TRUNK
LOCATION ZONE: LEG
LOCATION ZONE: FACE
LOCATION ZONE: HAND

## 2021-09-15 ASSESSMENT — LOCATION DETAILED DESCRIPTION DERM
LOCATION DETAILED: LEFT BUTTOCK
LOCATION DETAILED: LEFT SUPERIOR UPPER BACK
LOCATION DETAILED: RIGHT BUTTOCK
LOCATION DETAILED: RIGHT ANTERIOR LATERAL DISTAL THIGH
LOCATION DETAILED: RIGHT RADIAL DORSAL HAND
LOCATION DETAILED: LEFT CENTRAL ZYGOMA
LOCATION DETAILED: RIGHT MEDIAL POSTERIOR ANKLE

## 2021-09-15 ASSESSMENT — LOCATION SIMPLE DESCRIPTION DERM
LOCATION SIMPLE: RIGHT ANKLE
LOCATION SIMPLE: RIGHT BUTTOCK
LOCATION SIMPLE: LEFT ZYGOMA
LOCATION SIMPLE: LEFT BUTTOCK
LOCATION SIMPLE: RIGHT HAND
LOCATION SIMPLE: LEFT UPPER BACK
LOCATION SIMPLE: RIGHT THIGH

## 2021-09-15 NOTE — PROCEDURE: COUNSELING
Detail Level: Detailed
Patient Specific Counseling (Will Not Stick From Patient To Patient): *\\n\\nNo changes noted when compared to the previously taken photo.  Patient will monitor site closely and follow up in 6 months, sooner if needed.
Detail Level: Simple
Detail Level: Generalized

## 2021-09-15 NOTE — PROCEDURE: BIOPSY BY SHAVE METHOD
Body Location Override (Optional - Billing Will Still Be Based On Selected Body Map Location If Applicable): right anterior distal pretibial region
Detail Level: Detailed
Depth Of Biopsy: dermis
Was A Bandage Applied: Yes
Size Of Lesion In Cm: 1
X Size Of Lesion In Cm: 0
Biopsy Type: H and E
Biopsy Method: Personna blade
Anesthesia Type: 1% lidocaine with 1:100,000 epinephrine and a 1:10 solution of 8.4% sodium bicarbonate
Hemostasis: Aluminum Chloride
Wound Care: Petrolatum
Dressing: bandage
Destruction After The Procedure: No
Type Of Destruction Used: Cryotherapy
Curettage Text: The wound bed was treated with curettage after the biopsy was performed.
Cryotherapy Text: The wound bed was treated with cryotherapy after the biopsy was performed.
Electrodesiccation Text: The wound bed was treated with electrodesiccation after the biopsy was performed.
Electrodesiccation And Curettage Text: The wound bed was treated with electrodesiccation and curettage after the biopsy was performed.
Silver Nitrate Text: The wound bed was treated with silver nitrate after the biopsy was performed.
Lab: [id number]
Lab Facility: 
Consent: Written consent was obtained and risks were reviewed including but not limited to scarring, infection, bleeding, scabbing, incomplete removal, nerve damage and allergy to anesthesia.
Post-Care Instructions: I reviewed with the patient in detail post-care instructions. Patient is to keep the biopsy site dry overnight, and then apply bacitracin/petroleum twice daily until healed.
Notification Instructions: Patient will be notified of biopsy results. However, patient instructed to call the office if not contacted within 2 weeks.
Billing Type: Third-Party Bill
Information: Selecting Yes will display possible errors in your note based on the variables you have selected. This validation is only offered as a suggestion for you. PLEASE NOTE THAT THE VALIDATION TEXT WILL BE REMOVED WHEN YOU FINALIZE YOUR NOTE. IF YOU WANT TO FAX A PRELIMINARY NOTE YOU WILL NEED TO TOGGLE THIS TO 'NO' IF YOU DO NOT WANT IT IN YOUR FAXED NOTE.
Size Of Lesion In Cm: 0.6

## 2021-09-23 DIAGNOSIS — J30.1 ALLERGIC RHINITIS DUE TO POLLEN, UNSPECIFIED SEASONALITY: ICD-10-CM

## 2021-09-23 RX ORDER — FLUTICASONE PROPIONATE 50 MCG
SPRAY, SUSPENSION (ML) NASAL
Qty: 48 ML | Refills: 1 | Status: SHIPPED | OUTPATIENT
Start: 2021-09-23 | End: 2022-05-05 | Stop reason: SDUPTHER

## 2021-10-27 ENCOUNTER — APPOINTMENT (RX ONLY)
Dept: URBAN - METROPOLITAN AREA CLINIC 4 | Facility: CLINIC | Age: 76
Setting detail: DERMATOLOGY
End: 2021-10-27

## 2021-10-27 PROBLEM — C44.712 BASAL CELL CARCINOMA OF SKIN OF RIGHT LOWER LIMB, INCLUDING HIP: Status: ACTIVE | Noted: 2021-10-27

## 2021-10-27 PROCEDURE — ? EXCISION

## 2021-10-27 PROCEDURE — ? DIAGNOSIS COMMENT

## 2021-10-27 PROCEDURE — 12032 INTMD RPR S/A/T/EXT 2.6-7.5: CPT

## 2021-10-27 PROCEDURE — 11602 EXC TR-EXT MAL+MARG 1.1-2 CM: CPT

## 2021-10-27 NOTE — HPI: PROCEDURE (SKIN SURGERY)
Has The Growth Been Previously Biopsied?: has been previously biopsied
Body Location Override (Optional): Right medial distal pretibial region

## 2021-10-27 NOTE — PROCEDURE: EXCISION
Body Location Override (Optional - Billing Will Still Be Based On Selected Body Map Location If Applicable): right medial distal pretibial region
Biopsy Photograph Reviewed: Yes
Previous Accession (Optional): [id number]
Date Of Previous Biopsy (Optional): 9/15/21
Size Of Lesion In Cm: 0.8
X Size Of Lesion In Cm (Optional): 0
Size Of Margin In Cm: 0.2
Anesthesia Volume In Cc: 6
Was An Eye Clamp Used?: No
Eye Clamp Note Details: An eye clamp was used during the procedure.
Excision Method: Fusiform
Repair Type: Intermediate
Suturegard Retention Suture: 2-0 Nylon
Retention Suture Bite Size: 3 mm
Length To Time In Minutes Device Was In Place: 10
Number Of Hemigard Strips Per Side: 1
Intermediate / Complex Repair - Final Wound Length In Cm: 3
Undermining Type: Entire Wound
Debridement Text: The wound edges were debrided prior to proceeding with the closure to facilitate wound healing.
Helical Rim Text: The closure involved the helical rim.
Vermilion Border Text: The closure involved the vermilion border.
Nostril Rim Text: The closure involved the nostril rim.
Retention Suture Text: Retention sutures were placed to support the closure and prevent dehiscence.
Suture Removal: 14 days
Lab: [id number]
Lab Facility: 
Graft Donor Site Bandage (Optional-Leave Blank If You Don't Want In Note): Steri-strips and a pressure bandage were applied to the donor site.
Epidermal Closure Graft Donor Site (Optional): simple interrupted
Billing Type: Third-Party Bill
Excision Depth: adipose tissue
Scalpel Size: 15 blade
Anesthesia Type: 1% lidocaine with 1:100,000 epinephrine and a 1:10 solution of 8.4% sodium bicarbonate
Hemostasis: Electrocautery
Estimated Blood Loss (Cc): minimal
Detail Level: Detailed
Anesthesia Type: 1% lidocaine with 1:100,000 epinephrine and 408mcg clindamycin/ml and a 1:10 solution of 8.4% sodium bicarbonate
Deep Sutures: 4-0 Maxon
Epidermal Sutures: 4-0 Polypropylene
Epidermal Closure: running cuticular
Wound Care: Petrolatum
Dressing: pressure dressing
Suturegard Intro: Intraoperative tissue expansion was performed, utilizing the SUTUREGARD device, in order to reduce wound tension.
Suturegard Body: The suture ends were repeatedly re-tightened and re-clamped to achieve the desired tissue expansion.
Hemigard Intro: Due to skin fragility and wound tension, it was decided to use HEMIGARD adhesive retention suture devices to permit a linear closure. The skin was cleaned and dried for a 6cm distance away from the wound. Excessive hair, if present, was removed to allow for adhesion.
Hemigard Postcare Instructions: The HEMIGARD strips are to remain completely dry for at least 5-7 days.
Positioning (Leave Blank If You Do Not Want): The patient was placed in a comfortable position exposing the surgical site.
Pre-Excision Curettage Text (Leave Blank If You Do Not Want): Prior to drawing the surgical margin the visible lesion was removed with electrodesiccation and curettage to clearly define the lesion size.
Complex Repair Preamble Text (Leave Blank If You Do Not Want): Extensive wide undermining was performed.
Intermediate Repair Preamble Text (Leave Blank If You Do Not Want): Undermining was performed with blunt dissection.
Curvilinear Excision Additional Text (Leave Blank If You Do Not Want): The margin was drawn around the clinically apparent lesion.  A curvilinear shape was then drawn on the skin incorporating the lesion and margins.  Incisions were then made along these lines to the appropriate tissue plane and the lesion was extirpated.
Fusiform Excision Additional Text (Leave Blank If You Do Not Want): The margin was drawn around the clinically apparent lesion.  A fusiform shape was then drawn on the skin incorporating the lesion and margins.  Incisions were then made along these lines to the appropriate tissue plane and the lesion was extirpated.
Elliptical Excision Additional Text (Leave Blank If You Do Not Want): The margin was drawn around the clinically apparent lesion.  An elliptical shape was then drawn on the skin incorporating the lesion and margins.  Incisions were then made along these lines to the appropriate tissue plane and the lesion was extirpated.
Saucerization Excision Additional Text (Leave Blank If You Do Not Want): The margin was drawn around the clinically apparent lesion.  Incisions were then made along these lines, in a tangential fashion, to the appropriate tissue plane and the lesion was extirpated.
Slit Excision Additional Text (Leave Blank If You Do Not Want): A linear line was drawn on the skin overlying the lesion. An incision was made slowly until the lesion was visualized.  Once visualized, the lesion was removed with blunt dissection.
Excisional Biopsy Additional Text (Leave Blank If You Do Not Want): The margin was drawn around the clinically apparent lesion. An elliptical shape was then drawn on the skin incorporating the lesion and margins.  Incisions were then made along these lines to the appropriate tissue plane and the lesion was extirpated.
Perilesional Excision Additional Text (Leave Blank If You Do Not Want): The margin was drawn around the clinically apparent lesion. Incisions were then made along these lines to the appropriate tissue plane and the lesion was extirpated.
Repair Performed By Another Provider Text (Leave Blank If You Do Not Want): After the tissue was excised the defect was repaired by another provider.
No Repair - Repaired With Adjacent Surgical Defect Text (Leave Blank If You Do Not Want): After the excision the defect was repaired concurrently with another surgical defect which was in close approximation.
Advancement Flap (Single) Text: The defect edges were debeveled with a #15 scalpel blade.  Given the location of the defect and the proximity to free margins a single advancement flap was deemed most appropriate.  Using a sterile surgical marker, an appropriate advancement flap was drawn incorporating the defect and placing the expected incisions within the relaxed skin tension lines where possible.    The area thus outlined was incised deep to adipose tissue with a #15 scalpel blade.  The skin margins were undermined to an appropriate distance in all directions utilizing iris scissors.
Advancement Flap (Double) Text: The defect edges were debeveled with a #15 scalpel blade.  Given the location of the defect and the proximity to free margins a double advancement flap was deemed most appropriate.  Using a sterile surgical marker, the appropriate advancement flaps were drawn incorporating the defect and placing the expected incisions within the relaxed skin tension lines where possible.    The area thus outlined was incised deep to adipose tissue with a #15 scalpel blade.  The skin margins were undermined to an appropriate distance in all directions utilizing iris scissors.
Burow's Advancement Flap Text: The defect edges were debeveled with a #15 scalpel blade.  Given the location of the defect and the proximity to free margins a Burow's advancement flap was deemed most appropriate.  Using a sterile surgical marker, the appropriate advancement flap was drawn incorporating the defect and placing the expected incisions within the relaxed skin tension lines where possible.    The area thus outlined was incised deep to adipose tissue with a #15 scalpel blade.  The skin margins were undermined to an appropriate distance in all directions utilizing iris scissors.
Chonodrocutaneous Helical Advancement Flap Text: The defect edges were debeveled with a #15 scalpel blade.  Given the location of the defect and the proximity to free margins a chondrocutaneous helical advancement flap was deemed most appropriate.  Using a sterile surgical marker, the appropriate advancement flap was drawn incorporating the defect and placing the expected incisions within the relaxed skin tension lines where possible.    The area thus outlined was incised deep to adipose tissue with a #15 scalpel blade.  The skin margins were undermined to an appropriate distance in all directions utilizing iris scissors.
Crescentic Advancement Flap Text: The defect edges were debeveled with a #15 scalpel blade.  Given the location of the defect and the proximity to free margins a crescentic advancement flap was deemed most appropriate.  Using a sterile surgical marker, the appropriate advancement flap was drawn incorporating the defect and placing the expected incisions within the relaxed skin tension lines where possible.    The area thus outlined was incised deep to adipose tissue with a #15 scalpel blade.  The skin margins were undermined to an appropriate distance in all directions utilizing iris scissors.
A-T Advancement Flap Text: The defect edges were debeveled with a #15 scalpel blade.  Given the location of the defect, shape of the defect and the proximity to free margins an A-T advancement flap was deemed most appropriate.  Using a sterile surgical marker, an appropriate advancement flap was drawn incorporating the defect and placing the expected incisions within the relaxed skin tension lines where possible.    The area thus outlined was incised deep to adipose tissue with a #15 scalpel blade.  The skin margins were undermined to an appropriate distance in all directions utilizing iris scissors.
O-T Advancement Flap Text: The defect edges were debeveled with a #15 scalpel blade.  Given the location of the defect, shape of the defect and the proximity to free margins an O-T advancement flap was deemed most appropriate.  Using a sterile surgical marker, an appropriate advancement flap was drawn incorporating the defect and placing the expected incisions within the relaxed skin tension lines where possible.    The area thus outlined was incised deep to adipose tissue with a #15 scalpel blade.  The skin margins were undermined to an appropriate distance in all directions utilizing iris scissors.
O-L Flap Text: The defect edges were debeveled with a #15 scalpel blade.  Given the location of the defect, shape of the defect and the proximity to free margins an O-L flap was deemed most appropriate.  Using a sterile surgical marker, an appropriate advancement flap was drawn incorporating the defect and placing the expected incisions within the relaxed skin tension lines where possible.    The area thus outlined was incised deep to adipose tissue with a #15 scalpel blade.  The skin margins were undermined to an appropriate distance in all directions utilizing iris scissors.
O-Z Flap Text: The defect edges were debeveled with a #15 scalpel blade.  Given the location of the defect, shape of the defect and the proximity to free margins an O-Z flap was deemed most appropriate.  Using a sterile surgical marker, an appropriate transposition flap was drawn incorporating the defect and placing the expected incisions within the relaxed skin tension lines where possible. The area thus outlined was incised deep to adipose tissue with a #15 scalpel blade.  The skin margins were undermined to an appropriate distance in all directions utilizing iris scissors.
Double O-Z Flap Text: The defect edges were debeveled with a #15 scalpel blade.  Given the location of the defect, shape of the defect and the proximity to free margins a Double O-Z flap was deemed most appropriate.  Using a sterile surgical marker, an appropriate transposition flap was drawn incorporating the defect and placing the expected incisions within the relaxed skin tension lines where possible. The area thus outlined was incised deep to adipose tissue with a #15 scalpel blade.  The skin margins were undermined to an appropriate distance in all directions utilizing iris scissors.
V-Y Flap Text: The defect edges were debeveled with a #15 scalpel blade.  Given the location of the defect, shape of the defect and the proximity to free margins a V-Y flap was deemed most appropriate.  Using a sterile surgical marker, an appropriate advancement flap was drawn incorporating the defect and placing the expected incisions within the relaxed skin tension lines where possible.    The area thus outlined was incised deep to adipose tissue with a #15 scalpel blade.  The skin margins were undermined to an appropriate distance in all directions utilizing iris scissors.
Advancement-Rotation Flap Text: The defect edges were debeveled with a #15 scalpel blade.  Given the location of the defect, shape of the defect and the proximity to free margins an advancement-rotation flap was deemed most appropriate.  Using a sterile surgical marker, an appropriate flap was drawn incorporating the defect and placing the expected incisions within the relaxed skin tension lines where possible. The area thus outlined was incised deep to adipose tissue with a #15 scalpel blade.  The skin margins were undermined to an appropriate distance in all directions utilizing iris scissors.
Mercedes Flap Text: The defect edges were debeveled with a #15 scalpel blade.  Given the location of the defect, shape of the defect and the proximity to free margins a Mercedes flap was deemed most appropriate.  Using a sterile surgical marker, an appropriate advancement flap was drawn incorporating the defect and placing the expected incisions within the relaxed skin tension lines where possible. The area thus outlined was incised deep to adipose tissue with a #15 scalpel blade.  The skin margins were undermined to an appropriate distance in all directions utilizing iris scissors.
Modified Advancement Flap Text: The defect edges were debeveled with a #15 scalpel blade.  Given the location of the defect, shape of the defect and the proximity to free margins a modified advancement flap was deemed most appropriate.  Using a sterile surgical marker, an appropriate advancement flap was drawn incorporating the defect and placing the expected incisions within the relaxed skin tension lines where possible.    The area thus outlined was incised deep to adipose tissue with a #15 scalpel blade.  The skin margins were undermined to an appropriate distance in all directions utilizing iris scissors.
Mucosal Advancement Flap Text: Given the location of the defect, shape of the defect and the proximity to free margins a mucosal advancement flap was deemed most appropriate. Incisions were made with a 15 blade scalpel in the appropriate fashion along the cutaneous vermilion border and the mucosal lip. The remaining actinically damaged mucosal tissue was excised.  The mucosal advancement flap was then elevated to the gingival sulcus with care taken to preserve the neurovascular structures and advanced into the primary defect. Care was taken to ensure that precise realignment of the vermilion border was achieved.
Peng Advancement Flap Text: The defect edges were debeveled with a #15 scalpel blade.  Given the location of the defect, shape of the defect and the proximity to free margins a Peng advancement flap was deemed most appropriate.  Using a sterile surgical marker, an appropriate advancement flap was drawn incorporating the defect and placing the expected incisions within the relaxed skin tension lines where possible. The area thus outlined was incised deep to adipose tissue with a #15 scalpel blade.  The skin margins were undermined to an appropriate distance in all directions utilizing iris scissors.
Hatchet Flap Text: The defect edges were debeveled with a #15 scalpel blade.  Given the location of the defect, shape of the defect and the proximity to free margins a hatchet flap was deemed most appropriate.  Using a sterile surgical marker, an appropriate hatchet flap was drawn incorporating the defect and placing the expected incisions within the relaxed skin tension lines where possible.    The area thus outlined was incised deep to adipose tissue with a #15 scalpel blade.  The skin margins were undermined to an appropriate distance in all directions utilizing iris scissors.
Rotation Flap Text: The defect edges were debeveled with a #15 scalpel blade.  Given the location of the defect, shape of the defect and the proximity to free margins a rotation flap was deemed most appropriate.  Using a sterile surgical marker, an appropriate rotation flap was drawn incorporating the defect and placing the expected incisions within the relaxed skin tension lines where possible.    The area thus outlined was incised deep to adipose tissue with a #15 scalpel blade.  The skin margins were undermined to an appropriate distance in all directions utilizing iris scissors.
Spiral Flap Text: The defect edges were debeveled with a #15 scalpel blade.  Given the location of the defect, shape of the defect and the proximity to free margins a spiral flap was deemed most appropriate.  Using a sterile surgical marker, an appropriate rotation flap was drawn incorporating the defect and placing the expected incisions within the relaxed skin tension lines where possible. The area thus outlined was incised deep to adipose tissue with a #15 scalpel blade.  The skin margins were undermined to an appropriate distance in all directions utilizing iris scissors.
Staged Advancement Flap Text: The defect edges were debeveled with a #15 scalpel blade.  Given the location of the defect, shape of the defect and the proximity to free margins a staged advancement flap was deemed most appropriate.  Using a sterile surgical marker, an appropriate advancement flap was drawn incorporating the defect and placing the expected incisions within the relaxed skin tension lines where possible. The area thus outlined was incised deep to adipose tissue with a #15 scalpel blade.  The skin margins were undermined to an appropriate distance in all directions utilizing iris scissors.
Star Wedge Flap Text: The defect edges were debeveled with a #15 scalpel blade.  Given the location of the defect, shape of the defect and the proximity to free margins a star wedge flap was deemed most appropriate.  Using a sterile surgical marker, an appropriate rotation flap was drawn incorporating the defect and placing the expected incisions within the relaxed skin tension lines where possible. The area thus outlined was incised deep to adipose tissue with a #15 scalpel blade.  The skin margins were undermined to an appropriate distance in all directions utilizing iris scissors.
Transposition Flap Text: The defect edges were debeveled with a #15 scalpel blade.  Given the location of the defect and the proximity to free margins a transposition flap was deemed most appropriate.  Using a sterile surgical marker, an appropriate transposition flap was drawn incorporating the defect.    The area thus outlined was incised deep to adipose tissue with a #15 scalpel blade.  The skin margins were undermined to an appropriate distance in all directions utilizing iris scissors.
Muscle Hinge Flap Text: The defect edges were debeveled with a #15 scalpel blade.  Given the size, depth and location of the defect and the proximity to free margins a muscle hinge flap was deemed most appropriate.  Using a sterile surgical marker, an appropriate hinge flap was drawn incorporating the defect. The area thus outlined was incised with a #15 scalpel blade.  The skin margins were undermined to an appropriate distance in all directions utilizing iris scissors.
Mustarde Flap Text: The defect edges were debeveled with a #15 scalpel blade.  Given the size, depth and location of the defect and the proximity to free margins a Mustarde flap was deemed most appropriate.  Using a sterile surgical marker, an appropriate flap was drawn incorporating the defect. The area thus outlined was incised with a #15 scalpel blade.  The skin margins were undermined to an appropriate distance in all directions utilizing iris scissors.
Nasal Turnover Hinge Flap Text: The defect edges were debeveled with a #15 scalpel blade.  Given the size, depth, location of the defect and the defect being full thickness a nasal turnover hinge flap was deemed most appropriate.  Using a sterile surgical marker, an appropriate hinge flap was drawn incorporating the defect. The area thus outlined was incised with a #15 scalpel blade. The flap was designed to recreate the nasal mucosal lining and the alar rim. The skin margins were undermined to an appropriate distance in all directions utilizing iris scissors.
Nasalis-Muscle-Based Myocutaneous Island Pedicle Flap Text: Using a #15 blade, an incision was made around the donor flap to the level of the nasalis muscle. Wide lateral undermining was then performed in both the subcutaneous plane above the nasalis muscle, and in a submuscular plane just above periosteum. This allowed the formation of a free nasalis muscle axial pedicle (based on the angular artery) which was still attached to the actual cutaneous flap, increasing its mobility and vascular viability. Hemostasis was obtained with pinpoint electrocoagulation. The flap was mobilized into position and the pivotal anchor points positioned and stabilized with buried interrupted sutures. Subcutaneous and dermal tissues were closed in a multilayered fashion with sutures. Tissue redundancies were excised, and the epidermal edges were apposed without significant tension and sutured with sutures.
Orbicularis Oris Muscle Flap Text: The defect edges were debeveled with a #15 scalpel blade.  Given that the defect affected the competency of the oral sphincter an orbicularis oris muscle flap was deemed most appropriate to restore this competency and normal muscle function.  Using a sterile surgical marker, an appropriate flap was drawn incorporating the defect. The area thus outlined was incised with a #15 scalpel blade.
Melolabial Transposition Flap Text: The defect edges were debeveled with a #15 scalpel blade.  Given the location of the defect and the proximity to free margins a melolabial flap was deemed most appropriate.  Using a sterile surgical marker, an appropriate melolabial transposition flap was drawn incorporating the defect.    The area thus outlined was incised deep to adipose tissue with a #15 scalpel blade.  The skin margins were undermined to an appropriate distance in all directions utilizing iris scissors.
Rhombic Flap Text: The defect edges were debeveled with a #15 scalpel blade.  Given the location of the defect and the proximity to free margins a rhombic flap was deemed most appropriate.  Using a sterile surgical marker, an appropriate rhombic flap was drawn incorporating the defect.    The area thus outlined was incised deep to adipose tissue with a #15 scalpel blade.  The skin margins were undermined to an appropriate distance in all directions utilizing iris scissors.
Rhomboid Transposition Flap Text: The defect edges were debeveled with a #15 scalpel blade.  Given the location of the defect and the proximity to free margins a rhomboid transposition flap was deemed most appropriate.  Using a sterile surgical marker, an appropriate rhomboid flap was drawn incorporating the defect.    The area thus outlined was incised deep to adipose tissue with a #15 scalpel blade.  The skin margins were undermined to an appropriate distance in all directions utilizing iris scissors.
Bi-Rhombic Flap Text: The defect edges were debeveled with a #15 scalpel blade.  Given the location of the defect and the proximity to free margins a bi-rhombic flap was deemed most appropriate.  Using a sterile surgical marker, an appropriate rhombic flap was drawn incorporating the defect. The area thus outlined was incised deep to adipose tissue with a #15 scalpel blade.  The skin margins were undermined to an appropriate distance in all directions utilizing iris scissors.
Helical Rim Advancement Flap Text: The defect edges were debeveled with a #15 blade scalpel.  Given the location of the defect and the proximity to free margins (helical rim) a double helical rim advancement flap was deemed most appropriate.  Using a sterile surgical marker, the appropriate advancement flaps were drawn incorporating the defect and placing the expected incisions between the helical rim and antihelix where possible.  The area thus outlined was incised through and through with a #15 scalpel blade.  With a skin hook and iris scissors, the flaps were gently and sharply undermined and freed up.
Bilateral Helical Rim Advancement Flap Text: The defect edges were debeveled with a #15 blade scalpel.  Given the location of the defect and the proximity to free margins (helical rim) a bilateral helical rim advancement flap was deemed most appropriate.  Using a sterile surgical marker, the appropriate advancement flaps were drawn incorporating the defect and placing the expected incisions between the helical rim and antihelix where possible.  The area thus outlined was incised through and through with a #15 scalpel blade.  With a skin hook and iris scissors, the flaps were gently and sharply undermined and freed up.
Ear Star Wedge Flap Text: The defect edges were debeveled with a #15 blade scalpel.  Given the location of the defect and the proximity to free margins (helical rim) an ear star wedge flap was deemed most appropriate.  Using a sterile surgical marker, the appropriate flap was drawn incorporating the defect and placing the expected incisions between the helical rim and antihelix where possible.  The area thus outlined was incised through and through with a #15 scalpel blade.
Banner Transposition Flap Text: The defect edges were debeveled with a #15 scalpel blade.  Given the location of the defect and the proximity to free margins a Banner transposition flap was deemed most appropriate.  Using a sterile surgical marker, an appropriate flap drawn around the defect. The area thus outlined was incised deep to adipose tissue with a #15 scalpel blade.  The skin margins were undermined to an appropriate distance in all directions utilizing iris scissors.
Bilobed Flap Text: The defect edges were debeveled with a #15 scalpel blade.  Given the location of the defect and the proximity to free margins a bilobe flap was deemed most appropriate.  Using a sterile surgical marker, an appropriate bilobe flap drawn around the defect.    The area thus outlined was incised deep to adipose tissue with a #15 scalpel blade.  The skin margins were undermined to an appropriate distance in all directions utilizing iris scissors.
Bilobed Transposition Flap Text: The defect edges were debeveled with a #15 scalpel blade.  Given the location of the defect and the proximity to free margins a bilobed transposition flap was deemed most appropriate.  Using a sterile surgical marker, an appropriate bilobe flap drawn around the defect.    The area thus outlined was incised deep to adipose tissue with a #15 scalpel blade.  The skin margins were undermined to an appropriate distance in all directions utilizing iris scissors.
Trilobed Flap Text: The defect edges were debeveled with a #15 scalpel blade.  Given the location of the defect and the proximity to free margins a trilobed flap was deemed most appropriate.  Using a sterile surgical marker, an appropriate trilobed flap drawn around the defect.    The area thus outlined was incised deep to adipose tissue with a #15 scalpel blade.  The skin margins were undermined to an appropriate distance in all directions utilizing iris scissors.
Dorsal Nasal Flap Text: The defect edges were debeveled with a #15 scalpel blade.  Given the location of the defect and the proximity to free margins a dorsal nasal flap was deemed most appropriate.  Using a sterile surgical marker, an appropriate dorsal nasal flap was drawn around the defect.    The area thus outlined was incised deep to adipose tissue with a #15 scalpel blade.  The skin margins were undermined to an appropriate distance in all directions utilizing iris scissors.
Island Pedicle Flap Text: The defect edges were debeveled with a #15 scalpel blade.  Given the location of the defect, shape of the defect and the proximity to free margins an island pedicle advancement flap was deemed most appropriate.  Using a sterile surgical marker, an appropriate advancement flap was drawn incorporating the defect, outlining the appropriate donor tissue and placing the expected incisions within the relaxed skin tension lines where possible.    The area thus outlined was incised deep to adipose tissue with a #15 scalpel blade.  The skin margins were undermined to an appropriate distance in all directions around the primary defect and laterally outward around the island pedicle utilizing iris scissors.  There was minimal undermining beneath the pedicle flap.
Island Pedicle Flap With Canthal Suspension Text: The defect edges were debeveled with a #15 scalpel blade.  Given the location of the defect, shape of the defect and the proximity to free margins an island pedicle advancement flap was deemed most appropriate.  Using a sterile surgical marker, an appropriate advancement flap was drawn incorporating the defect, outlining the appropriate donor tissue and placing the expected incisions within the relaxed skin tension lines where possible. The area thus outlined was incised deep to adipose tissue with a #15 scalpel blade.  The skin margins were undermined to an appropriate distance in all directions around the primary defect and laterally outward around the island pedicle utilizing iris scissors.  There was minimal undermining beneath the pedicle flap. A suspension suture was placed in the canthal tendon to prevent tension and prevent ectropion.
Alar Island Pedicle Flap Text: The defect edges were debeveled with a #15 scalpel blade.  Given the location of the defect, shape of the defect and the proximity to the alar rim an island pedicle advancement flap was deemed most appropriate.  Using a sterile surgical marker, an appropriate advancement flap was drawn incorporating the defect, outlining the appropriate donor tissue and placing the expected incisions within the nasal ala running parallel to the alar rim. The area thus outlined was incised with a #15 scalpel blade.  The skin margins were undermined minimally to an appropriate distance in all directions around the primary defect and laterally outward around the island pedicle utilizing iris scissors.  There was minimal undermining beneath the pedicle flap.
Double Island Pedicle Flap Text: The defect edges were debeveled with a #15 scalpel blade.  Given the location of the defect, shape of the defect and the proximity to free margins a double island pedicle advancement flap was deemed most appropriate.  Using a sterile surgical marker, an appropriate advancement flap was drawn incorporating the defect, outlining the appropriate donor tissue and placing the expected incisions within the relaxed skin tension lines where possible.    The area thus outlined was incised deep to adipose tissue with a #15 scalpel blade.  The skin margins were undermined to an appropriate distance in all directions around the primary defect and laterally outward around the island pedicle utilizing iris scissors.  There was minimal undermining beneath the pedicle flap.
Island Pedicle Flap-Requiring Vessel Identification Text: The defect edges were debeveled with a #15 scalpel blade.  Given the location of the defect, shape of the defect and the proximity to free margins an island pedicle advancement flap was deemed most appropriate.  Using a sterile surgical marker, an appropriate advancement flap was drawn, based on the axial vessel mentioned above, incorporating the defect, outlining the appropriate donor tissue and placing the expected incisions within the relaxed skin tension lines where possible.    The area thus outlined was incised deep to adipose tissue with a #15 scalpel blade.  The skin margins were undermined to an appropriate distance in all directions around the primary defect and laterally outward around the island pedicle utilizing iris scissors.  There was minimal undermining beneath the pedicle flap.
Keystone Flap Text: The defect edges were debeveled with a #15 scalpel blade.  Given the location of the defect, shape of the defect a keystone flap was deemed most appropriate.  Using a sterile surgical marker, an appropriate keystone flap was drawn incorporating the defect, outlining the appropriate donor tissue and placing the expected incisions within the relaxed skin tension lines where possible. The area thus outlined was incised deep to adipose tissue with a #15 scalpel blade.  The skin margins were undermined to an appropriate distance in all directions around the primary defect and laterally outward around the flap utilizing iris scissors.
O-T Plasty Text: The defect edges were debeveled with a #15 scalpel blade.  Given the location of the defect, shape of the defect and the proximity to free margins an O-T plasty was deemed most appropriate.  Using a sterile surgical marker, an appropriate O-T plasty was drawn incorporating the defect and placing the expected incisions within the relaxed skin tension lines where possible.    The area thus outlined was incised deep to adipose tissue with a #15 scalpel blade.  The skin margins were undermined to an appropriate distance in all directions utilizing iris scissors.
O-Z Plasty Text: The defect edges were debeveled with a #15 scalpel blade.  Given the location of the defect, shape of the defect and the proximity to free margins an O-Z plasty (double transposition flap) was deemed most appropriate.  Using a sterile surgical marker, the appropriate transposition flaps were drawn incorporating the defect and placing the expected incisions within the relaxed skin tension lines where possible.    The area thus outlined was incised deep to adipose tissue with a #15 scalpel blade.  The skin margins were undermined to an appropriate distance in all directions utilizing iris scissors.  Hemostasis was achieved with electrocautery.  The flaps were then transposed into place, one clockwise and the other counterclockwise, and anchored with interrupted buried subcutaneous sutures.
Double O-Z Plasty Text: The defect edges were debeveled with a #15 scalpel blade.  Given the location of the defect, shape of the defect and the proximity to free margins a Double O-Z plasty (double transposition flap) was deemed most appropriate.  Using a sterile surgical marker, the appropriate transposition flaps were drawn incorporating the defect and placing the expected incisions within the relaxed skin tension lines where possible. The area thus outlined was incised deep to adipose tissue with a #15 scalpel blade.  The skin margins were undermined to an appropriate distance in all directions utilizing iris scissors.  Hemostasis was achieved with electrocautery.  The flaps were then transposed into place, one clockwise and the other counterclockwise, and anchored with interrupted buried subcutaneous sutures.
V-Y Plasty Text: The defect edges were debeveled with a #15 scalpel blade.  Given the location of the defect, shape of the defect and the proximity to free margins an V-Y advancement flap was deemed most appropriate.  Using a sterile surgical marker, an appropriate advancement flap was drawn incorporating the defect and placing the expected incisions within the relaxed skin tension lines where possible.    The area thus outlined was incised deep to adipose tissue with a #15 scalpel blade.  The skin margins were undermined to an appropriate distance in all directions utilizing iris scissors.
H Plasty Text: Given the location of the defect, shape of the defect and the proximity to free margins a H-plasty was deemed most appropriate for repair.  Using a sterile surgical marker, the appropriate advancement arms of the H-plasty were drawn incorporating the defect and placing the expected incisions within the relaxed skin tension lines where possible. The area thus outlined was incised deep to adipose tissue with a #15 scalpel blade. The skin margins were undermined to an appropriate distance in all directions utilizing iris scissors.  The opposing advancement arms were then advanced into place in opposite direction and anchored with interrupted buried subcutaneous sutures.
W Plasty Text: The lesion was extirpated to the level of the fat with a #15 scalpel blade.  Given the location of the defect, shape of the defect and the proximity to free margins a W-plasty was deemed most appropriate for repair.  Using a sterile surgical marker, the appropriate transposition arms of the W-plasty were drawn incorporating the defect and placing the expected incisions within the relaxed skin tension lines where possible.    The area thus outlined was incised deep to adipose tissue with a #15 scalpel blade.  The skin margins were undermined to an appropriate distance in all directions utilizing iris scissors.  The opposing transposition arms were then transposed into place in opposite direction and anchored with interrupted buried subcutaneous sutures.
Z Plasty Text: The lesion was extirpated to the level of the fat with a #15 scalpel blade.  Given the location of the defect, shape of the defect and the proximity to free margins a Z-plasty was deemed most appropriate for repair.  Using a sterile surgical marker, the appropriate transposition arms of the Z-plasty were drawn incorporating the defect and placing the expected incisions within the relaxed skin tension lines where possible.    The area thus outlined was incised deep to adipose tissue with a #15 scalpel blade.  The skin margins were undermined to an appropriate distance in all directions utilizing iris scissors.  The opposing transposition arms were then transposed into place in opposite direction and anchored with interrupted buried subcutaneous sutures.
Zygomaticofacial Flap Text: Given the location of the defect, shape of the defect and the proximity to free margins a zygomaticofacial flap was deemed most appropriate for repair.  Using a sterile surgical marker, the appropriate flap was drawn incorporating the defect and placing the expected incisions within the relaxed skin tension lines where possible. The area thus outlined was incised deep to adipose tissue with a #15 scalpel blade with preservation of a vascular pedicle.  The skin margins were undermined to an appropriate distance in all directions utilizing iris scissors.  The flap was then placed into the defect and anchored with interrupted buried subcutaneous sutures.
Cheek Interpolation Flap Text: A decision was made to reconstruct the defect utilizing an interpolation axial flap and a staged reconstruction.  A telfa template was made of the defect.  This telfa template was then used to outline the Cheek Interpolation flap.  The donor area for the pedicle flap was then injected with anesthesia.  The flap was excised through the skin and subcutaneous tissue down to the layer of the underlying musculature.  The interpolation flap was carefully excised within this deep plane to maintain its blood supply.  The edges of the donor site were undermined.   The donor site was closed in a primary fashion.  The pedicle was then rotated into position and sutured.  Once the tube was sutured into place, adequate blood supply was confirmed with blanching and refill.  The pedicle was then wrapped with xeroform gauze and dressed appropriately with a telfa and gauze bandage to ensure continued blood supply and protect the attached pedicle.
Cheek-To-Nose Interpolation Flap Text: A decision was made to reconstruct the defect utilizing an interpolation axial flap and a staged reconstruction.  A telfa template was made of the defect.  This telfa template was then used to outline the Cheek-To-Nose Interpolation flap.  The donor area for the pedicle flap was then injected with anesthesia.  The flap was excised through the skin and subcutaneous tissue down to the layer of the underlying musculature.  The interpolation flap was carefully excised within this deep plane to maintain its blood supply.  The edges of the donor site were undermined.   The donor site was closed in a primary fashion.  The pedicle was then rotated into position and sutured.  Once the tube was sutured into place, adequate blood supply was confirmed with blanching and refill.  The pedicle was then wrapped with xeroform gauze and dressed appropriately with a telfa and gauze bandage to ensure continued blood supply and protect the attached pedicle.
Interpolation Flap Text: A decision was made to reconstruct the defect utilizing an interpolation axial flap and a staged reconstruction.  A telfa template was made of the defect.  This telfa template was then used to outline the interpolation flap.  The donor area for the pedicle flap was then injected with anesthesia.  The flap was excised through the skin and subcutaneous tissue down to the layer of the underlying musculature.  The interpolation flap was carefully excised within this deep plane to maintain its blood supply.  The edges of the donor site were undermined.   The donor site was closed in a primary fashion.  The pedicle was then rotated into position and sutured.  Once the tube was sutured into place, adequate blood supply was confirmed with blanching and refill.  The pedicle was then wrapped with xeroform gauze and dressed appropriately with a telfa and gauze bandage to ensure continued blood supply and protect the attached pedicle.
Melolabial Interpolation Flap Text: A decision was made to reconstruct the defect utilizing an interpolation axial flap and a staged reconstruction.  A telfa template was made of the defect.  This telfa template was then used to outline the melolabial interpolation flap.  The donor area for the pedicle flap was then injected with anesthesia.  The flap was excised through the skin and subcutaneous tissue down to the layer of the underlying musculature.  The pedicle flap was carefully excised within this deep plane to maintain its blood supply.  The edges of the donor site were undermined.   The donor site was closed in a primary fashion.  The pedicle was then rotated into position and sutured.  Once the tube was sutured into place, adequate blood supply was confirmed with blanching and refill.  The pedicle was then wrapped with xeroform gauze and dressed appropriately with a telfa and gauze bandage to ensure continued blood supply and protect the attached pedicle.
Mastoid Interpolation Flap Text: A decision was made to reconstruct the defect utilizing an interpolation axial flap and a staged reconstruction.  A telfa template was made of the defect.  This telfa template was then used to outline the mastoid interpolation flap.  The donor area for the pedicle flap was then injected with anesthesia.  The flap was excised through the skin and subcutaneous tissue down to the layer of the underlying musculature.  The pedicle flap was carefully excised within this deep plane to maintain its blood supply.  The edges of the donor site were undermined.   The donor site was closed in a primary fashion.  The pedicle was then rotated into position and sutured.  Once the tube was sutured into place, adequate blood supply was confirmed with blanching and refill.  The pedicle was then wrapped with xeroform gauze and dressed appropriately with a telfa and gauze bandage to ensure continued blood supply and protect the attached pedicle.
Posterior Auricular Interpolation Flap Text: A decision was made to reconstruct the defect utilizing an interpolation axial flap and a staged reconstruction.  A telfa template was made of the defect.  This telfa template was then used to outline the posterior auricular interpolation flap.  The donor area for the pedicle flap was then injected with anesthesia.  The flap was excised through the skin and subcutaneous tissue down to the layer of the underlying musculature.  The pedicle flap was carefully excised within this deep plane to maintain its blood supply.  The edges of the donor site were undermined.   The donor site was closed in a primary fashion.  The pedicle was then rotated into position and sutured.  Once the tube was sutured into place, adequate blood supply was confirmed with blanching and refill.  The pedicle was then wrapped with xeroform gauze and dressed appropriately with a telfa and gauze bandage to ensure continued blood supply and protect the attached pedicle.
Paramedian Forehead Flap Text: A decision was made to reconstruct the defect utilizing an interpolation axial flap and a staged reconstruction.  A telfa template was made of the defect.  This telfa template was then used to outline the paramedian forehead pedicle flap.  The donor area for the pedicle flap was then injected with anesthesia.  The flap was excised through the skin and subcutaneous tissue down to the layer of the underlying musculature.  The pedicle flap was carefully excised within this deep plane to maintain its blood supply.  The edges of the donor site were undermined.   The donor site was closed in a primary fashion.  The pedicle was then rotated into position and sutured.  Once the tube was sutured into place, adequate blood supply was confirmed with blanching and refill.  The pedicle was then wrapped with xeroform gauze and dressed appropriately with a telfa and gauze bandage to ensure continued blood supply and protect the attached pedicle.
Lip Wedge Excision Repair Text: Given the location of the defect and the proximity to free margins a full thickness wedge repair was deemed most appropriate.  Using a sterile surgical marker, the appropriate repair was drawn incorporating the defect and placing the expected incisions perpendicular to the vermilion border.  The vermilion border was also meticulously outlined to ensure appropriate reapproximation during the repair.  The area thus outlined was incised through and through with a #15 scalpel blade.  The muscularis and dermis were reaproximated with deep sutures following hemostasis. Care was taken to realign the vermilion border before proceeding with the superficial closure.  Once the vermilion was realigned the superfical and mucosal closure was finished.
Ftsg Text: The defect edges were debeveled with a #15 scalpel blade.  Given the location of the defect, shape of the defect and the proximity to free margins a full thickness skin graft was deemed most appropriate.  Using a sterile surgical marker, the primary defect shape was transferred to the donor site. The area thus outlined was incised deep to adipose tissue with a #15 scalpel blade.  The harvested graft was then trimmed of adipose tissue until only dermis and epidermis was left.  The skin margins of the secondary defect were undermined to an appropriate distance in all directions utilizing iris scissors.  The secondary defect was closed with interrupted buried subcutaneous sutures.  The skin edges were then re-apposed with running  sutures.  The skin graft was then placed in the primary defect and oriented appropriately.
Split-Thickness Skin Graft Text: The defect edges were debeveled with a #15 scalpel blade.  Given the location of the defect, shape of the defect and the proximity to free margins a split thickness skin graft was deemed most appropriate.  Using a sterile surgical marker, the primary defect shape was transferred to the donor site. The split thickness graft was then harvested.  The skin graft was then placed in the primary defect and oriented appropriately.
Burow's Graft Text: The defect edges were debeveled with a #15 scalpel blade.  Given the location of the defect, shape of the defect, the proximity to free margins and the presence of a standing cone deformity a Burow's skin graft was deemed most appropriate. The standing cone was removed and this tissue was then trimmed to the shape of the primary defect. The adipose tissue was also removed until only dermis and epidermis were left.  The skin margins of the secondary defect were undermined to an appropriate distance in all directions utilizing iris scissors.  The secondary defect was closed with interrupted buried subcutaneous sutures.  The skin edges were then re-apposed with running  sutures.  The skin graft was then placed in the primary defect and oriented appropriately.
Cartilage Graft Text: The defect edges were debeveled with a #15 scalpel blade.  Given the location of the defect, shape of the defect, the fact the defect involved a full thickness cartilage defect a cartilage graft was deemed most appropriate.  An appropriate donor site was identified, cleansed, and anesthetized. The cartilage graft was then harvested and transferred to the recipient site, oriented appropriately and then sutured into place.  The secondary defect was then repaired using a primary closure.
Composite Graft Text: The defect edges were debeveled with a #15 scalpel blade.  Given the location of the defect, shape of the defect, the proximity to free margins and the fact the defect was full thickness a composite graft was deemed most appropriate.  The defect was outline and then transferred to the donor site.  A full thickness graft was then excised from the donor site. The graft was then placed in the primary defect, oriented appropriately and then sutured into place.  The secondary defect was then repaired using a primary closure.
Epidermal Autograft Text: The defect edges were debeveled with a #15 scalpel blade.  Given the location of the defect, shape of the defect and the proximity to free margins an epidermal autograft was deemed most appropriate.  Using a sterile surgical marker, the primary defect shape was transferred to the donor site. The epidermal graft was then harvested.  The skin graft was then placed in the primary defect and oriented appropriately.
Dermal Autograft Text: The defect edges were debeveled with a #15 scalpel blade.  Given the location of the defect, shape of the defect and the proximity to free margins a dermal autograft was deemed most appropriate.  Using a sterile surgical marker, the primary defect shape was transferred to the donor site. The area thus outlined was incised deep to adipose tissue with a #15 scalpel blade.  The harvested graft was then trimmed of adipose and epidermal tissue until only dermis was left.  The skin graft was then placed in the primary defect and oriented appropriately.
Skin Substitute Text: The defect edges were debeveled with a #15 scalpel blade.  Given the location of the defect, shape of the defect and the proximity to free margins a skin substitute graft was deemed most appropriate.  The graft material was trimmed to fit the size of the defect. The graft was then placed in the primary defect and oriented appropriately.
Tissue Cultured Epidermal Autograft Text: The defect edges were debeveled with a #15 scalpel blade.  Given the location of the defect, shape of the defect and the proximity to free margins a tissue cultured epidermal autograft was deemed most appropriate.  The graft was then trimmed to fit the size of the defect.  The graft was then placed in the primary defect and oriented appropriately.
Xenograft Text: The defect edges were debeveled with a #15 scalpel blade.  Given the location of the defect, shape of the defect and the proximity to free margins a xenograft was deemed most appropriate.  The graft was then trimmed to fit the size of the defect.  The graft was then placed in the primary defect and oriented appropriately.
Purse String (Intermediate) Text: Given the location of the defect and the characteristics of the surrounding skin a purse string intermediate closure was deemed most appropriate.  Undermining was performed circumfirentially around the surgical defect.  A purse string suture was then placed and tightened.
Purse String (Simple) Text: Given the location of the defect and the characteristics of the surrounding skin a purse string simple closure was deemed most appropriate.  Undermining was performed circumferentially around the surgical defect.  A purse string suture was then placed and tightened.
Partial Purse String (Intermediate) Text: Given the location of the defect and the characteristics of the surrounding skin an intermediate purse string closure was deemed most appropriate.  Undermining was performed circumferentially around the surgical defect.  A purse string suture was then placed and tightened. Wound tension of the circular defect prevented complete closure of the wound.
Partial Purse String (Simple) Text: Given the location of the defect and the characteristics of the surrounding skin a simple purse string closure was deemed most appropriate.  Undermining was performed circumferentially around the surgical defect.  A purse string suture was then placed and tightened. Wound tension of the circular defect prevented complete closure of the wound.
Complex Repair And Single Advancement Flap Text: The defect edges were debeveled with a #15 scalpel blade.  The primary defect was closed partially with a complex linear closure.  Given the location of the remaining defect, shape of the defect and the proximity to free margins a single advancement flap was deemed most appropriate for complete closure of the defect.  Using a sterile surgical marker, an appropriate advancement flap was drawn incorporating the defect and placing the expected incisions within the relaxed skin tension lines where possible.    The area thus outlined was incised deep to adipose tissue with a #15 scalpel blade.  The skin margins were undermined to an appropriate distance in all directions utilizing iris scissors.
Complex Repair And Double Advancement Flap Text: The defect edges were debeveled with a #15 scalpel blade.  The primary defect was closed partially with a complex linear closure.  Given the location of the remaining defect, shape of the defect and the proximity to free margins a double advancement flap was deemed most appropriate for complete closure of the defect.  Using a sterile surgical marker, an appropriate advancement flap was drawn incorporating the defect and placing the expected incisions within the relaxed skin tension lines where possible.    The area thus outlined was incised deep to adipose tissue with a #15 scalpel blade.  The skin margins were undermined to an appropriate distance in all directions utilizing iris scissors.
Complex Repair And Modified Advancement Flap Text: The defect edges were debeveled with a #15 scalpel blade.  The primary defect was closed partially with a complex linear closure.  Given the location of the remaining defect, shape of the defect and the proximity to free margins a modified advancement flap was deemed most appropriate for complete closure of the defect.  Using a sterile surgical marker, an appropriate advancement flap was drawn incorporating the defect and placing the expected incisions within the relaxed skin tension lines where possible.    The area thus outlined was incised deep to adipose tissue with a #15 scalpel blade.  The skin margins were undermined to an appropriate distance in all directions utilizing iris scissors.
Complex Repair And A-T Advancement Flap Text: The defect edges were debeveled with a #15 scalpel blade.  The primary defect was closed partially with a complex linear closure.  Given the location of the remaining defect, shape of the defect and the proximity to free margins an A-T advancement flap was deemed most appropriate for complete closure of the defect.  Using a sterile surgical marker, an appropriate advancement flap was drawn incorporating the defect and placing the expected incisions within the relaxed skin tension lines where possible.    The area thus outlined was incised deep to adipose tissue with a #15 scalpel blade.  The skin margins were undermined to an appropriate distance in all directions utilizing iris scissors.
Complex Repair And O-T Advancement Flap Text: The defect edges were debeveled with a #15 scalpel blade.  The primary defect was closed partially with a complex linear closure.  Given the location of the remaining defect, shape of the defect and the proximity to free margins an O-T advancement flap was deemed most appropriate for complete closure of the defect.  Using a sterile surgical marker, an appropriate advancement flap was drawn incorporating the defect and placing the expected incisions within the relaxed skin tension lines where possible.    The area thus outlined was incised deep to adipose tissue with a #15 scalpel blade.  The skin margins were undermined to an appropriate distance in all directions utilizing iris scissors.
Complex Repair And O-L Flap Text: The defect edges were debeveled with a #15 scalpel blade.  The primary defect was closed partially with a complex linear closure.  Given the location of the remaining defect, shape of the defect and the proximity to free margins an O-L flap was deemed most appropriate for complete closure of the defect.  Using a sterile surgical marker, an appropriate flap was drawn incorporating the defect and placing the expected incisions within the relaxed skin tension lines where possible.    The area thus outlined was incised deep to adipose tissue with a #15 scalpel blade.  The skin margins were undermined to an appropriate distance in all directions utilizing iris scissors.
Complex Repair And Bilobe Flap Text: The defect edges were debeveled with a #15 scalpel blade.  The primary defect was closed partially with a complex linear closure.  Given the location of the remaining defect, shape of the defect and the proximity to free margins a bilobe flap was deemed most appropriate for complete closure of the defect.  Using a sterile surgical marker, an appropriate advancement flap was drawn incorporating the defect and placing the expected incisions within the relaxed skin tension lines where possible.    The area thus outlined was incised deep to adipose tissue with a #15 scalpel blade.  The skin margins were undermined to an appropriate distance in all directions utilizing iris scissors.
Complex Repair And Melolabial Flap Text: The defect edges were debeveled with a #15 scalpel blade.  The primary defect was closed partially with a complex linear closure.  Given the location of the remaining defect, shape of the defect and the proximity to free margins a melolabial flap was deemed most appropriate for complete closure of the defect.  Using a sterile surgical marker, an appropriate advancement flap was drawn incorporating the defect and placing the expected incisions within the relaxed skin tension lines where possible.    The area thus outlined was incised deep to adipose tissue with a #15 scalpel blade.  The skin margins were undermined to an appropriate distance in all directions utilizing iris scissors.
Complex Repair And Rotation Flap Text: The defect edges were debeveled with a #15 scalpel blade.  The primary defect was closed partially with a complex linear closure.  Given the location of the remaining defect, shape of the defect and the proximity to free margins a rotation flap was deemed most appropriate for complete closure of the defect.  Using a sterile surgical marker, an appropriate advancement flap was drawn incorporating the defect and placing the expected incisions within the relaxed skin tension lines where possible.    The area thus outlined was incised deep to adipose tissue with a #15 scalpel blade.  The skin margins were undermined to an appropriate distance in all directions utilizing iris scissors.
Complex Repair And Rhombic Flap Text: The defect edges were debeveled with a #15 scalpel blade.  The primary defect was closed partially with a complex linear closure.  Given the location of the remaining defect, shape of the defect and the proximity to free margins a rhombic flap was deemed most appropriate for complete closure of the defect.  Using a sterile surgical marker, an appropriate advancement flap was drawn incorporating the defect and placing the expected incisions within the relaxed skin tension lines where possible.    The area thus outlined was incised deep to adipose tissue with a #15 scalpel blade.  The skin margins were undermined to an appropriate distance in all directions utilizing iris scissors.
Complex Repair And Transposition Flap Text: The defect edges were debeveled with a #15 scalpel blade.  The primary defect was closed partially with a complex linear closure.  Given the location of the remaining defect, shape of the defect and the proximity to free margins a transposition flap was deemed most appropriate for complete closure of the defect.  Using a sterile surgical marker, an appropriate advancement flap was drawn incorporating the defect and placing the expected incisions within the relaxed skin tension lines where possible.    The area thus outlined was incised deep to adipose tissue with a #15 scalpel blade.  The skin margins were undermined to an appropriate distance in all directions utilizing iris scissors.
Complex Repair And V-Y Plasty Text: The defect edges were debeveled with a #15 scalpel blade.  The primary defect was closed partially with a complex linear closure.  Given the location of the remaining defect, shape of the defect and the proximity to free margins a V-Y plasty was deemed most appropriate for complete closure of the defect.  Using a sterile surgical marker, an appropriate advancement flap was drawn incorporating the defect and placing the expected incisions within the relaxed skin tension lines where possible.    The area thus outlined was incised deep to adipose tissue with a #15 scalpel blade.  The skin margins were undermined to an appropriate distance in all directions utilizing iris scissors.
Complex Repair And M Plasty Text: The defect edges were debeveled with a #15 scalpel blade.  The primary defect was closed partially with a complex linear closure.  Given the location of the remaining defect, shape of the defect and the proximity to free margins an M plasty was deemed most appropriate for complete closure of the defect.  Using a sterile surgical marker, an appropriate advancement flap was drawn incorporating the defect and placing the expected incisions within the relaxed skin tension lines where possible.    The area thus outlined was incised deep to adipose tissue with a #15 scalpel blade.  The skin margins were undermined to an appropriate distance in all directions utilizing iris scissors.
Complex Repair And Double M Plasty Text: The defect edges were debeveled with a #15 scalpel blade.  The primary defect was closed partially with a complex linear closure.  Given the location of the remaining defect, shape of the defect and the proximity to free margins a double M plasty was deemed most appropriate for complete closure of the defect.  Using a sterile surgical marker, an appropriate advancement flap was drawn incorporating the defect and placing the expected incisions within the relaxed skin tension lines where possible.    The area thus outlined was incised deep to adipose tissue with a #15 scalpel blade.  The skin margins were undermined to an appropriate distance in all directions utilizing iris scissors.
Complex Repair And W Plasty Text: The defect edges were debeveled with a #15 scalpel blade.  The primary defect was closed partially with a complex linear closure.  Given the location of the remaining defect, shape of the defect and the proximity to free margins a W plasty was deemed most appropriate for complete closure of the defect.  Using a sterile surgical marker, an appropriate advancement flap was drawn incorporating the defect and placing the expected incisions within the relaxed skin tension lines where possible.    The area thus outlined was incised deep to adipose tissue with a #15 scalpel blade.  The skin margins were undermined to an appropriate distance in all directions utilizing iris scissors.
Complex Repair And Z Plasty Text: The defect edges were debeveled with a #15 scalpel blade.  The primary defect was closed partially with a complex linear closure.  Given the location of the remaining defect, shape of the defect and the proximity to free margins a Z plasty was deemed most appropriate for complete closure of the defect.  Using a sterile surgical marker, an appropriate advancement flap was drawn incorporating the defect and placing the expected incisions within the relaxed skin tension lines where possible.    The area thus outlined was incised deep to adipose tissue with a #15 scalpel blade.  The skin margins were undermined to an appropriate distance in all directions utilizing iris scissors.
Complex Repair And Dorsal Nasal Flap Text: The defect edges were debeveled with a #15 scalpel blade.  The primary defect was closed partially with a complex linear closure.  Given the location of the remaining defect, shape of the defect and the proximity to free margins a dorsal nasal flap was deemed most appropriate for complete closure of the defect.  Using a sterile surgical marker, an appropriate flap was drawn incorporating the defect and placing the expected incisions within the relaxed skin tension lines where possible.    The area thus outlined was incised deep to adipose tissue with a #15 scalpel blade.  The skin margins were undermined to an appropriate distance in all directions utilizing iris scissors.
Complex Repair And Ftsg Text: The defect edges were debeveled with a #15 scalpel blade.  The primary defect was closed partially with a complex linear closure.  Given the location of the defect, shape of the defect and the proximity to free margins a full thickness skin graft was deemed most appropriate to repair the remaining defect.  The graft was trimmed to fit the size of the remaining defect.  The graft was then placed in the primary defect, oriented appropriately, and sutured into place.
Complex Repair And Burow's Graft Text: The defect edges were debeveled with a #15 scalpel blade.  The primary defect was closed partially with a complex linear closure.  Given the location of the defect, shape of the defect, the proximity to free margins and the presence of a standing cone deformity a Burow's graft was deemed most appropriate to repair the remaining defect.  The graft was trimmed to fit the size of the remaining defect.  The graft was then placed in the primary defect, oriented appropriately, and sutured into place.
Complex Repair And Split-Thickness Skin Graft Text: The defect edges were debeveled with a #15 scalpel blade.  The primary defect was closed partially with a complex linear closure.  Given the location of the defect, shape of the defect and the proximity to free margins a split thickness skin graft was deemed most appropriate to repair the remaining defect.  The graft was trimmed to fit the size of the remaining defect.  The graft was then placed in the primary defect, oriented appropriately, and sutured into place.
Complex Repair And Epidermal Autograft Text: The defect edges were debeveled with a #15 scalpel blade.  The primary defect was closed partially with a complex linear closure.  Given the location of the defect, shape of the defect and the proximity to free margins an epidermal autograft was deemed most appropriate to repair the remaining defect.  The graft was trimmed to fit the size of the remaining defect.  The graft was then placed in the primary defect, oriented appropriately, and sutured into place.
Complex Repair And Dermal Autograft Text: The defect edges were debeveled with a #15 scalpel blade.  The primary defect was closed partially with a complex linear closure.  Given the location of the defect, shape of the defect and the proximity to free margins an dermal autograft was deemed most appropriate to repair the remaining defect.  The graft was trimmed to fit the size of the remaining defect.  The graft was then placed in the primary defect, oriented appropriately, and sutured into place.
Complex Repair And Tissue Cultured Epidermal Autograft Text: The defect edges were debeveled with a #15 scalpel blade.  The primary defect was closed partially with a complex linear closure.  Given the location of the defect, shape of the defect and the proximity to free margins an tissue cultured epidermal autograft was deemed most appropriate to repair the remaining defect.  The graft was trimmed to fit the size of the remaining defect.  The graft was then placed in the primary defect, oriented appropriately, and sutured into place.
Complex Repair And Xenograft Text: The defect edges were debeveled with a #15 scalpel blade.  The primary defect was closed partially with a complex linear closure.  Given the location of the defect, shape of the defect and the proximity to free margins a xenograft was deemed most appropriate to repair the remaining defect.  The graft was trimmed to fit the size of the remaining defect.  The graft was then placed in the primary defect, oriented appropriately, and sutured into place.
Complex Repair And Skin Substitute Graft Text: The defect edges were debeveled with a #15 scalpel blade.  The primary defect was closed partially with a complex linear closure.  Given the location of the remaining defect, shape of the defect and the proximity to free margins a skin substitute graft was deemed most appropriate to repair the remaining defect.  The graft was trimmed to fit the size of the remaining defect.  The graft was then placed in the primary defect, oriented appropriately, and sutured into place.
Path Notes (To The Dermatopathologist): Please check margins.
Consent was obtained from the patient. The risks and benefits to therapy were discussed in detail. Specifically, the risks of infection, scarring, bleeding, prolonged wound healing, incomplete removal, allergy to anesthesia, nerve injury and recurrence were addressed. Prior to the procedure, the treatment site was clearly identified and confirmed by the patient. All components of Universal Protocol/PAUSE Rule completed.
Post-Care Instructions: I reviewed with the patient in detail post-care instructions. Patient is not to engage in any heavy lifting and moderate exercise for the next 14 days. Should the patient develop any fevers, chills, bleeding, severe pain patient will contact the office immediately.
Home Suture Removal Text: Patient was provided a home suture removal kit and will remove their sutures at home.  If they have any questions or difficulties they will call the office.
Where Do You Want The Question To Include Opioid Counseling Located?: Case Summary Tab
Information: Selecting Yes will display possible errors in your note based on the variables you have selected. This validation is only offered as a suggestion for you. PLEASE NOTE THAT THE VALIDATION TEXT WILL BE REMOVED WHEN YOU FINALIZE YOUR NOTE. IF YOU WANT TO FAX A PRELIMINARY NOTE YOU WILL NEED TO TOGGLE THIS TO 'NO' IF YOU DO NOT WANT IT IN YOUR FAXED NOTE.

## 2021-10-27 NOTE — PROCEDURE: MIPS QUALITY
Quality 226: Preventive Care And Screening: Tobacco Use: Screening And Cessation Intervention: Patient screened for tobacco use and is an ex/non-smoker
Quality 265: Biopsy Follow-Up: Biopsy results reviewed, communicated, tracked, and documented
Quality 111:Pneumonia Vaccination Status For Older Adults: Pneumococcal Vaccination Previously Received
Quality 130: Documentation Of Current Medications In The Medical Record: Current Medications Documented
Detail Level: Detailed

## 2021-10-27 NOTE — PROCEDURE: DIAGNOSIS COMMENT
Comment: [id number], BCC, infiltrative pattern with marked dermal fibrosis
Detail Level: Detailed
Render Risk Assessment In Note?: no

## 2021-11-04 ENCOUNTER — HOSPITAL ENCOUNTER (OUTPATIENT)
Dept: LAB | Facility: MEDICAL CENTER | Age: 76
End: 2021-11-04
Attending: INTERNAL MEDICINE
Payer: MEDICARE

## 2021-11-04 LAB
T4 FREE SERPL-MCNC: 1.61 NG/DL (ref 0.93–1.7)
TSH SERPL DL<=0.005 MIU/L-ACNC: 1.17 UIU/ML (ref 0.38–5.33)

## 2021-11-04 PROCEDURE — 84439 ASSAY OF FREE THYROXINE: CPT

## 2021-11-04 PROCEDURE — 36415 COLL VENOUS BLD VENIPUNCTURE: CPT

## 2021-11-04 PROCEDURE — 84443 ASSAY THYROID STIM HORMONE: CPT

## 2021-11-10 ENCOUNTER — APPOINTMENT (RX ONLY)
Dept: URBAN - METROPOLITAN AREA CLINIC 4 | Facility: CLINIC | Age: 76
Setting detail: DERMATOLOGY
End: 2021-11-10

## 2021-11-10 DIAGNOSIS — Z48.02 ENCOUNTER FOR REMOVAL OF SUTURES: ICD-10-CM

## 2021-11-10 PROCEDURE — ? SUTURE REMOVAL (GLOBAL PERIOD)

## 2021-11-10 ASSESSMENT — LOCATION DETAILED DESCRIPTION DERM: LOCATION DETAILED: RIGHT DISTAL PRETIBIAL REGION

## 2021-11-10 ASSESSMENT — LOCATION SIMPLE DESCRIPTION DERM: LOCATION SIMPLE: RIGHT PRETIBIAL REGION

## 2021-11-10 ASSESSMENT — LOCATION ZONE DERM: LOCATION ZONE: LEG

## 2021-11-10 NOTE — PROCEDURE: SUTURE REMOVAL (GLOBAL PERIOD)
Detail Level: Detailed
Add 51806 Cpt? (Important Note: In 2017 The Use Of 64904 Is Being Tracked By Cms To Determine Future Global Period Reimbursement For Global Periods): no

## 2021-12-06 SDOH — ECONOMIC STABILITY: HOUSING INSECURITY
IN THE LAST 12 MONTHS, WAS THERE A TIME WHEN YOU DID NOT HAVE A STEADY PLACE TO SLEEP OR SLEPT IN A SHELTER (INCLUDING NOW)?: NO

## 2021-12-06 SDOH — ECONOMIC STABILITY: INCOME INSECURITY: IN THE LAST 12 MONTHS, WAS THERE A TIME WHEN YOU WERE NOT ABLE TO PAY THE MORTGAGE OR RENT ON TIME?: NO

## 2021-12-06 SDOH — ECONOMIC STABILITY: FOOD INSECURITY: WITHIN THE PAST 12 MONTHS, THE FOOD YOU BOUGHT JUST DIDN'T LAST AND YOU DIDN'T HAVE MONEY TO GET MORE.: NEVER TRUE

## 2021-12-06 SDOH — HEALTH STABILITY: MENTAL HEALTH
STRESS IS WHEN SOMEONE FEELS TENSE, NERVOUS, ANXIOUS, OR CAN'T SLEEP AT NIGHT BECAUSE THEIR MIND IS TROUBLED. HOW STRESSED ARE YOU?: ONLY A LITTLE

## 2021-12-06 SDOH — ECONOMIC STABILITY: HOUSING INSECURITY: IN THE LAST 12 MONTHS, HOW MANY PLACES HAVE YOU LIVED?: 1

## 2021-12-06 SDOH — ECONOMIC STABILITY: TRANSPORTATION INSECURITY
IN THE PAST 12 MONTHS, HAS LACK OF TRANSPORTATION KEPT YOU FROM MEETINGS, WORK, OR FROM GETTING THINGS NEEDED FOR DAILY LIVING?: NO

## 2021-12-06 SDOH — ECONOMIC STABILITY: FOOD INSECURITY: WITHIN THE PAST 12 MONTHS, YOU WORRIED THAT YOUR FOOD WOULD RUN OUT BEFORE YOU GOT MONEY TO BUY MORE.: NEVER TRUE

## 2021-12-06 SDOH — ECONOMIC STABILITY: INCOME INSECURITY: HOW HARD IS IT FOR YOU TO PAY FOR THE VERY BASICS LIKE FOOD, HOUSING, MEDICAL CARE, AND HEATING?: NOT HARD AT ALL

## 2021-12-06 SDOH — ECONOMIC STABILITY: TRANSPORTATION INSECURITY
IN THE PAST 12 MONTHS, HAS LACK OF RELIABLE TRANSPORTATION KEPT YOU FROM MEDICAL APPOINTMENTS, MEETINGS, WORK OR FROM GETTING THINGS NEEDED FOR DAILY LIVING?: NO

## 2021-12-06 SDOH — HEALTH STABILITY: PHYSICAL HEALTH: ON AVERAGE, HOW MANY MINUTES DO YOU ENGAGE IN EXERCISE AT THIS LEVEL?: 50 MIN

## 2021-12-06 SDOH — ECONOMIC STABILITY: TRANSPORTATION INSECURITY
IN THE PAST 12 MONTHS, HAS THE LACK OF TRANSPORTATION KEPT YOU FROM MEDICAL APPOINTMENTS OR FROM GETTING MEDICATIONS?: NO

## 2021-12-06 SDOH — HEALTH STABILITY: PHYSICAL HEALTH: ON AVERAGE, HOW MANY DAYS PER WEEK DO YOU ENGAGE IN MODERATE TO STRENUOUS EXERCISE (LIKE A BRISK WALK)?: 2 DAYS

## 2021-12-06 ASSESSMENT — SOCIAL DETERMINANTS OF HEALTH (SDOH)
HOW HARD IS IT FOR YOU TO PAY FOR THE VERY BASICS LIKE FOOD, HOUSING, MEDICAL CARE, AND HEATING?: NOT HARD AT ALL
HOW OFTEN DO YOU ATTENT MEETINGS OF THE CLUB OR ORGANIZATION YOU BELONG TO?: MORE THAN 4 TIMES PER YEAR
IN A TYPICAL WEEK, HOW MANY TIMES DO YOU TALK ON THE PHONE WITH FAMILY, FRIENDS, OR NEIGHBORS?: TWICE A WEEK
WITHIN THE PAST 12 MONTHS, YOU WORRIED THAT YOUR FOOD WOULD RUN OUT BEFORE YOU GOT THE MONEY TO BUY MORE: NEVER TRUE
HOW OFTEN DO YOU GET TOGETHER WITH FRIENDS OR RELATIVES?: ONCE A WEEK
DO YOU BELONG TO ANY CLUBS OR ORGANIZATIONS SUCH AS CHURCH GROUPS UNIONS, FRATERNAL OR ATHLETIC GROUPS, OR SCHOOL GROUPS?: YES
HOW OFTEN DO YOU ATTEND CHURCH OR RELIGIOUS SERVICES?: MORE THAN 4 TIMES PER YEAR
HOW OFTEN DO YOU HAVE SIX OR MORE DRINKS ON ONE OCCASION: NEVER
HOW OFTEN DO YOU ATTENT MEETINGS OF THE CLUB OR ORGANIZATION YOU BELONG TO?: MORE THAN 4 TIMES PER YEAR
HOW MANY DRINKS CONTAINING ALCOHOL DO YOU HAVE ON A TYPICAL DAY WHEN YOU ARE DRINKING: 1 OR 2
HOW OFTEN DO YOU GET TOGETHER WITH FRIENDS OR RELATIVES?: ONCE A WEEK
DO YOU BELONG TO ANY CLUBS OR ORGANIZATIONS SUCH AS CHURCH GROUPS UNIONS, FRATERNAL OR ATHLETIC GROUPS, OR SCHOOL GROUPS?: YES
HOW OFTEN DO YOU HAVE A DRINK CONTAINING ALCOHOL: 4 OR MORE TIMES A WEEK
HOW OFTEN DO YOU ATTEND CHURCH OR RELIGIOUS SERVICES?: MORE THAN 4 TIMES PER YEAR
IN A TYPICAL WEEK, HOW MANY TIMES DO YOU TALK ON THE PHONE WITH FAMILY, FRIENDS, OR NEIGHBORS?: TWICE A WEEK

## 2021-12-06 ASSESSMENT — LIFESTYLE VARIABLES
HOW OFTEN DO YOU HAVE SIX OR MORE DRINKS ON ONE OCCASION: NEVER
HOW OFTEN DO YOU HAVE A DRINK CONTAINING ALCOHOL: 4 OR MORE TIMES A WEEK
HOW MANY STANDARD DRINKS CONTAINING ALCOHOL DO YOU HAVE ON A TYPICAL DAY: 1 OR 2

## 2021-12-07 ENCOUNTER — OFFICE VISIT (OUTPATIENT)
Dept: MEDICAL GROUP | Facility: MEDICAL CENTER | Age: 76
End: 2021-12-07
Payer: MEDICARE

## 2021-12-07 ENCOUNTER — TELEPHONE (OUTPATIENT)
Dept: MEDICAL GROUP | Facility: MEDICAL CENTER | Age: 76
End: 2021-12-07

## 2021-12-07 VITALS
HEART RATE: 74 BPM | TEMPERATURE: 97.4 F | OXYGEN SATURATION: 91 % | WEIGHT: 150 LBS | SYSTOLIC BLOOD PRESSURE: 148 MMHG | DIASTOLIC BLOOD PRESSURE: 82 MMHG | HEIGHT: 64 IN | BODY MASS INDEX: 25.61 KG/M2

## 2021-12-07 DIAGNOSIS — Z00.00 MEDICARE ANNUAL WELLNESS VISIT, SUBSEQUENT: ICD-10-CM

## 2021-12-07 DIAGNOSIS — R73.09 IMPAIRED GLUCOSE METABOLISM: ICD-10-CM

## 2021-12-07 DIAGNOSIS — R05.9 COUGH: ICD-10-CM

## 2021-12-07 DIAGNOSIS — E55.9 VITAMIN D DEFICIENCY: ICD-10-CM

## 2021-12-07 DIAGNOSIS — Z85.850 HISTORY OF THYROID CANCER: Chronic | ICD-10-CM

## 2021-12-07 DIAGNOSIS — E78.5 DYSLIPIDEMIA: ICD-10-CM

## 2021-12-07 PROCEDURE — G0439 PPPS, SUBSEQ VISIT: HCPCS | Performed by: INTERNAL MEDICINE

## 2021-12-07 ASSESSMENT — ACTIVITIES OF DAILY LIVING (ADL): BATHING_REQUIRES_ASSISTANCE: 0

## 2021-12-07 ASSESSMENT — ENCOUNTER SYMPTOMS: GENERAL WELL-BEING: GOOD

## 2021-12-07 ASSESSMENT — PATIENT HEALTH QUESTIONNAIRE - PHQ9: CLINICAL INTERPRETATION OF PHQ2 SCORE: 0

## 2021-12-07 ASSESSMENT — FIBROSIS 4 INDEX: FIB4 SCORE: 1.3

## 2021-12-07 NOTE — LETTER
[facility]  Asael Redd M.D.  [address]  [place] [address]  Fax: [phone]   Authorization for Release/Disclosure of   Protected Health Information   Name: LEONILA MAYS : 1945 SSN: [id number]   Address: [address] [place] [address]  [place] [address] Phone:    [phone] (home)    I authorize the entity listed below to release/disclose the PHI below to:   [facility]/Asael Redd M.D. and Asael Redd M.D.   Provider or Entity Name:   endocrinology    Address   City, State, Zip   Phone:      Fax:  [phone]   Reason for request: continuity of care   Information to be released:    [  ] LAST COLONOSCOPY,  including any PATH REPORT and follow-up  [  ] LAST FIT/COLOGUARD RESULT [  ] LAST DEXA  [  ] LAST MAMMOGRAM  [  ] LAST PAP  [  ] LAST LABS [  ] RETINA EXAM REPORT  [  ] IMMUNIZATION RECORDS  [ xxxx ] Release all info from last year      [  ] Check here and initial the line next to each item to release ALL health information INCLUDING  _____ Care and treatment for drug and / or alcohol abuse  _____ HIV testing, infection status, or AIDS  _____ Genetic Testing    DATES OF SERVICE OR TIME PERIOD TO BE DISCLOSED: _____________  I understand and acknowledge that:  * This Authorization may be revoked at any time by you in writing, except if your health information has already been used or disclosed.  * Your health information that will be used or disclosed as a result of you signing this authorization could be re-disclosed by the recipient. If this occurs, your re-disclosed health information may no longer be protected by State or Federal laws.  * You may refuse to sign this Authorization. Your refusal will not affect your ability to obtain treatment.  * This Authorization becomes effective upon signing and will  on (date) __________.      If no date is indicated, this Authorization will  one (1) year from the signature date.    Name: Leonila  Pippa    Signature:continuity of care   Date:     12/9/2021       PLEASE FAX REQUESTED RECORDS BACK TO: [phone]

## 2021-12-07 NOTE — PROGRESS NOTES
Subjective     Ping Das is a 76 y.o. female who presents medicare wellness          HPI  Here medicare wellness assessment  Current supplements: Reviewed  Chronic narcotic pain medicines: No  Allergies: Morphine and Other food  Sees  allergy on allegra instead of xyzal which made her drowsy, continues on Flonase, Atrovent nasal, Astelin.  Tries to eat healthy, walks two miles per session three days per week, remains on pantoprazole although she is not quite sure if this is helping her cough  Screening: Reviewed  Depressive Symptoms: Denies feeling down, depressed or hopeless. Denies loss of interest or pleasure in doing things   ADLs: Denies needing help with using telephone, transportation, shopping, preparing meals, housework, laundry, or managing medication or money.    Independent with bathing, hygiene, feeding, toileting, dressing    Memory concerns: Denies difficulty remembering details of conversations, events and upcoming appointments.  Mood has been stable off of wellbutrin  Hearing problems: Denies.   Recent falls no    Current social contact/activities: Reviewed    ROS:    Ostomy or other tubes or amputations no  No chronic oxygen  Gait: normal. Uses assistive device :  no  No reflux  Still with dry cough  No depression  Occasional palpitation    Health Care Screening recommendations reviewed with patient today and updated or ordered.  DPA/Advanced directive: Completed/Information provided.   Referrals for PT/OT/Nutrition counseling/Behavioral Health/Smoking cessation as above if indicated  Discussion today about general wellness and lifestyle habits:    Prevent falls and reduce trip hazards;   Have a working fire alarm and carbon monoxide detector;   Engage in regular physical activity and social activities;   Use sun protection when outdoors.           Current Outpatient Medications   Medication Sig Dispense Refill   • atorvastatin (LIPITOR) 80 MG tablet TAKE 1 TABLET BY MOUTH EVERY DAY 90  Tablet 1   • fluticasone (FLONASE) 50 MCG/ACT nasal spray SPRAY 2 SPRAYS INTO EACH NOSTRIL DAILY 48 mL 1   • potassium chloride SA (K-DUR) 10 MEQ Tab CR TAKE 1 TABLET BY MOUTH EVERY DAY 90 tablet 1   • melatonin 5 mg Tab      • pantoprazole (PROTONIX) 20 MG tablet Take 1 tablet by mouth every day. 90 tablet 2   • ipratropium (ATROVENT) 0.03 % Solution PUT 1 TO 2 SPRAYS INTO EACH NOSTRIL ONE TO TWO TIMES DAILY     • Calcium Polycarbophil (FIBER-CAPS PO) Take  by mouth.     • BIOTIN PO Take  by mouth.     • albuterol 108 (90 Base) MCG/ACT Aero Soln inhalation aerosol Inhale 1-2 Puffs every four hours as needed for Shortness of Breath (or cough). 1 Each 3   • levothyroxine (SYNTHROID) 88 MCG Tab One po six days per week from  1 tablet 0   • levothyroxine (SYNTHROID) 125 MCG Tab One po one day per week per  (Patient taking differently: Half a tab once a week) 1 tablet 0   • azelastine (ASTELIN) 137 MCG/SPRAY nasal spray Administer 1 Spray into affected nostril(S) 2 times a day.     • Levocetirizine Dihydrochloride (XYZAL PO) Take  by mouth.     • naproxen (NAPROSYN) 500 MG Tab Take 1 Tab by mouth 2 times a day as needed (pain). 180 Tab 1   • Multiple Vitamin (MULTI-DAY PO) Take  by mouth.     • Calcium Carbonate-Vitamin D (CALCIUM 600/VITAMIN D PO) Take  by mouth. Three times a week     • cyanocobalamin (VITAMIN B-12) 500 MCG Tab Take 500 mcg by mouth every day.     • Cholecalciferol (VITAMIN D3 PO) Take  by mouth.     • Glucosamine HCl (GLUCOSAMINE PO) Take  by mouth.     • Omega-3 Fatty Acids (OMEGA 3 PO) Take  by mouth.     • acyclovir (ZOVIRAX) 400 MG tablet Take 400 mg by mouth 2 times a day.       No current facility-administered medications for this visit.                 Tubular Adenoma  2004 no records path unknown  4/30/12 [facility] colon tubular adenoma, repeat 5 yrs  10/20/17 colon per [facility] 5 polyps pathology 4 polyps adenomatous and benign polypoid tissue, severe diverticulosis, recommend repeat  colonoscopy 3 years  8/25/21 colonoscopy per [facility] three adenomas, repeat in 3 years    skin lesion   Sees  dermatology     schatzki ring  8/25/21 EGD per [facility] noted schatzki's ring 18 mm balloon dilation, small hiatal hernia, biopsies negative     pulmonary nodules  6/17/20 CT high-resolution thorax no evidence of interstitial thickening or honeycombing, scattered cystic lucencies, consideration lymphocytic interstitial pneumonitis, emphysema, lymphangiomyomatosis, focal groundglass opacities upper lobes, coronary calcifications  10/12/20 CT thorax stable 5 to 6 mm biapical groundglass nodules, no new nodules hyperinflated lungs, atherosclerosis with coronary calcification  5/27/21 CT thorax without, stable less than 6 mm biapical groundglass nodules, emphysematous changes lungs  7/2/21 pulmonary note symptoms improved with singulair but she was not able to tolerate this medication, she also does see allergy and will continue with intranasal therapy, repeat CT thorax in 1 year, follow-up 6 months      Preventative health  1/19/12 pneumovax  1/19/12 tdap  3/5/14 zoster vaccine  2/5/15 prevnar  11/14/18 sonocine negative  11/27/18 pap per gyn   4/7/19 YOLANDA negative, EKG sinus, ultrasound aorta mild atherosclerotic changes, ultrasound carotid mild left plaque bulb done at [facility]  6/11/20 hep c ab negative   6/11/20 vit d 44  6/18/20 dexa LS-0.6,hip-1.4  6/18/20 mammogram heterogeneously dense breast tissue, offered screening breast ultrasound  2/5/20 shingrix second done  2/12/21 covid pfizer second  8/25/21 colonoscopy per [facility] three adenomas, repeat in 3 years  10/11/21 covid pfizer booster     Osteopenia  10/08 dexa LS -1.0, hip -0.4  7/11 dexa LS -0.5,hip -1.2  2/13 vit d 50  3/15/14 vit d 31  3/25/15 vit d 33  6/12/15 dexa LS -0.6,hip -1.3;FRAX 10.5% major,hip 1.7%  3/7/16 cxr mid and upper thoracic spine compression fracture  5/17/16 vit d 29 take extra 2000 units daily  9/5/17 dexa  LS-0.7,hip-1.2  9/5/17 vit d 42  6/11/20 vit d 44  6/18/20 dexa LS-0.6,hip-1.4     Ocular migraine     Microscopic hematuria  9/5/17 UA 2-5 RBC  9/7/17 urine culture negative, repeat UA 2 weeks, order in system  9/27/17 UA 2-5 RBC, will order us renal  10/4/17 ultrasound renal negative repeat urinalysis 2 weeks, if still positive will check CT urogram and urology evaluation  10/31/17 UA no RBC urine culture negative     Impaired glucose metabolism  10/19/18 A1c 6.2%  6/11/20 A1c 5.8%     HSV acyclovir as needed     History thyroid cancer  8/07 s/p thyroidectomy 1.1 cm no evid of adenopathy sees   3/11 dr.abbott saunders note u/s thyroid in june, on levoxyl 100 mg  6/11 tsh 0.33, on levoxyl 100 mcg  1/12 tsh 3.9 on levoxyl 100 mcg followed by   8/12 dr.abbott saunders note, tsh 0.05,free t4 1.46, thyroglobulin <0.9, decrease levothyroxine to 100 mcg. Repeat thyroid ultrasound February 1/13 tsh 0.1, thyroglobulin less than 0.1; dr.abbott saunders note no changes  1/29/14 tsh 0.17,free t4 1.0,thyroglobulin <0.1 on levothyroxine 100 mcg  2/25/14 tsh 0.12, free t4, free t4 1.2,thyroglobulin 0.1 on levothyroxine 100 mcg  9/5/14 tsh 0.13,free t4 1.1 on levothyroxine 100 mcg one pill six days per week ,1/2 pill on day 7  9/20/14  endocrine note  1/6/15 tsh 1.5,free t4 0.99 on levothyroxine 100 mcg 6 days per week and 75 mcg one day per week  1/13/15  endocrine note  3/24/15 tsh 0.3,free t4 1.5 on levothyroxine 100 mcg 6 days per week  4/2/15  endocrine note continue same dose levothyroxine and follow up 6 months  9/30/15 tsh 0.09,free t4 1.3, thyroglobulin<0.1, antithyroglobulin antibody less<0.9; on synthroid 100 mcg 6 days a week, synthroid 75 mcg one day per week  10/6/15  endocrinology note decrease total thyroid dose by 25 mcg during the week  5/17/16 tsh 0.6,free t4 0.9 on synthroid 100 mcg 6 days per week and 50 mcg 1 day per week? Per  endocrinology  5/15/17 tsh  1.1,free t4 1.1 on levothyroxine 100 mcg six days per week  5/18/17  endocrine note change to levothyroxine 75 mcg daily   11/27/17 tsh 0.15,free t4 1.26, thyroglbulin< 0.1, antithyroglobulin < 0.9  followed by  endocrinology  1/19/18 tsh 0.9 on levothyroxine per  endocrinology  4/17/18 tsh 0.7, free t4 1.2 on levothyroxine per  endocrinology  10/18/18 tsh 0.3,free t4 1.2,on levothyroxine 100 mcg six days and 1/2 tab day 7  10/24/18  endocrine note change levothyroxine to half a tablet twice a week, full tablet 5 days a week, recheck labs after  1/23/19 tsh 1.6,free t4 0.98, thyroglobulin less than 0.1, antithyroglobulin less than 0.9 per endocrinology  6/21/19 tsh 0.37, free t4 1.13 on levothyroxine 100 mcg  half a tablet twice a week, full tablet 5 days a week per  endocrinology  10/16/19 tsh 0.4, free t4 1.3    12/30/19 tsh 0.6,free t4 1.02 on levothyroxine 88 mcg six days per week and 1/2 tab one day per week per endocrine   1/6/20  endocrine note continue on levothyroxine 88 mcg six days per week and 1/2 tab one day per week  7/10/20 leonid kang endocrine note with , tsh 1.9, free t4 1.3, thyroglobulin<0.1, thyroglobulin antibody<0.9, on levothyroxine 88 mcg six days per week and 1/2 tab one day per week, change to levothyroxine 75 mcg daily  10/7/20 tsh 0.13,free t4 1.5 per endocrinology  6/11/20 thyroglobulin less than 0.1, thyroglobulin antibody less than 0.9 on levothyroxine 88 mcg 6 days a week, levothyroxine 75 mcg 1 day a week  10/7/20 tsh 1.19, free t4 1.56 on levothyroxine 88 mcg 6 days a week, levothyroxine 75 mcg 1 day a week  10/25/20 leonid kang at  endocrine note on levothyroxine 88 mcg 6 days a week, levothyroxine 75 mcg 1 day a week  12/8/20 leonid kang at  endocrine note tsh 0.71,free t4 1.3,  on levothyroxine 88 mcg 6 days a week, and levothyroxine 125 mcg 1/2 tab qday  4/12/21 tsh 0.5,free t4  1.5  4/19/21  endocrine note on levothyroxine 88 mcg 6 days a week, and levothyroxine 125 mcg 1 day a week   11/4/21 tsh 1.1,free t4 1.6 on levothyroxine 88 mcg 6 days a week, and levothyroxine 125 mcg 1/2 tab 1 day a week per     history of thoracic compression fracture  11/10/04 thoracic x-ray negative  3/4/14 cxr mid apex right lateral curvature thoracic spine may be positional or from mild scoliosis  3/7/16 cxr mid and upper thoracic spine compression fracture     History depression  Since 2001, having failed celexa and effexor?, lexapro working well  4/10 samples cymbalta  6/11 off cymbalta  7/24/14 trial wellbutrin 150 mg, PHQ-9 17  8/19/14 behavioral health note; pt will follow up with psychotherapy  9/22/14 increase wellbutrin to 300 mgdepression  10/21/14 PHQ 14  10/21/14 decrease wellbutrin to 150 mg and add effexor XR 75 mg qday  1/25/15 off wellbutrin     History compression fracture  11/10/04 thoracic x-ray negative  3/4/14 cxr mid apex right lateral curvature thoracic spine may be positional or from mild scoliosis  3/7/16 cxr mid and upper thoracic spine compression fracture  9/5/17 dexa LS-0.7,hip-1.2     Dyslipidemia  2/09 chol 204,trig 130, hdl 62, ldl 116  5/10 chol 142,trig 63,hdl 64,ldl 65  6/11 chol 120,trig 65,hdl 68,ldl 39 on mevacor and niaspan  1/12 chol 98,trig 55,hdl 40,ldl 47 on mevacor and niaspan; ok to stop niaspan  2/13 chol 172,trig 314,hdl 45,ldl 64 on mevacor 40 mg; start fish oil 2 bid   3/15/14 chol 198,trig 101,hdl 54,ldl 124 on mevacor 40 mg and fish oil  7/29/14 dietary evaluation and education  3/25/15 chol 175,trig 107,hdl 54,ldl 100 on mevacor 40 mg  5/17/16 chol 196,trig 105,hdl 52,ldl 123 on mevacor 40 mg  9/5/17 chol 206,trig 139,hdl 48,ldl 130 on mevacor 40 mg  9/8/17 change to lipitor 20 mg and repeat labs in 4 weeks  11/1/17 chol 179,trig 168,hdl 44,ldl 101 on lipitor 20 mg  10/19/18 chol 188,trig 170,hdl 47,ldl 107 on lipitor 20 mg  6/11/20 chol  181,trig 137,hdl 49,ldl 105 on lipitor 20 mg  10/12/20 CT thorax atherosclerosis with coronary calcification  11/12/20 CT coronary calcium score: LMA 0.0, LCx 211.5, .3, .5, .2 = 1169.4, probable dilation left ventricle and left atrium, linear density lingula and left lower lobe consistent with scar or atelectasis  11/12/20 increase lipitor to 80 mg repeat labs 4 weeks, stress echo ordered  2/8/21 chol 143,trig 151,hdl 41,ldl 72 on lipitor 80 mg  3/16/21 stress echo negative for ischemia, appropriate augmentation LV function after maximal exercise, EF 60%  4/13/21 ultrasound carotid <50% bilateral stenosis     cough  10/11/18 cough chronic in nature, question allergic rhinitis although treatment ineffective for preventing cough, tried prilosec over-the-counter no benefit  10/11/18 cxr negative  11/8/18 PFT FEV1 1.8 (87% predicted), FVC 2.5 (91% predicted), FEV/FVC 74%, no bronchodilator response, DLCO 120% predicted, normal pulmonary function testing   11/12/18 symptoms somewhat improved on prilosec 20 mg increased to 40 mg  1/6/20 trial protonix, CT high resolution ordered, referral allergy  6/17/20 CT high-resolution thorax no evidence of interstitial thickening or honeycombing, scattered cystic lucencies, consideration lymphocytic interstitial pneumonitis, emphysema, lymphangiomyomatosis, focal groundglass opacities upper lobes, coronary calcifications  7/8/20 pulmonary note start spiriva, repeat CT in 3 months  10/12/20 CT thorax stable 5 to 6 mm biapical groundglass nodules, no new nodules hyperinflated lungs, atherosclerosis with coronary calcification  10/28/20  allergy note cough, proceed with aeroallergens skin testing, continue flonase, start sinus saline rinse, consider ENT or GI referral if allergy testing is negative, consider trial of singulair or atrovent nasal spray  11/3/20 pulmonary note chronic cough, off spiriva, suspect GERD, consider ENT assessment vocal cords,  follow-up 6 months  2/16/21  allergy note cough, positive aeroallergens skin test on 11/3/20 positive to trees, grasses, weeds, one mold, cat, guinea pig, horse, rabbit and dust mite, continue xyxal, astelin, atrovent nasal, flonase, trial of stopping montelukast to see if fatigue improves, possible ENT evaluation chronic sinusitis, continue pantoprazole  3/10/21  ENT note flexible laryngoscopy, right nasal septal deviation, interarytenoid edema, cough is better with aggressive nasal regimen and PPI, follow-up GI for dysphagia, will obtain CT sinus, continue rinses bid, PPI before dinner, stop astelin  4/13/21 CT maxillofacial without plus reconstruction, no evidence of sinus disease, convex left nasal septal deviation  4/29/21 [facility] allergy note previous aeroallergens skin test positive to trees, grasses, weeds, mold, cat, guinea pig, horse, rabbit and dust mite, cough is improved with ipratropium and astelin, xyzal, pantoprazole, off montelukast, with improvement in symptoms patient would like to hold off on immunotherapy, continue xyzal consider adding allegra 180 mg in am, follow-up ENT chronic sinusitis   5/19/21 [facility] note chronic cough, suspect more allergy related given improvement after allergy sprays, although she did start PPI at the same time, also has had esophageal dysphagia on occasion with solid foods, will schedule EGD for evaluation  5/27/11 CT thorax without, stable less than 6 mm biapical groundglass nodules, emphysematous changes lungs  7/2/21 pulmonary note symptoms improved with singulair but she was not able to tolerate this medication, she also does see allergy and will continue with intranasal therapy, repeat CT thorax in 1 year, follow-up 6 months  8/25/21 EGD per [facility] noted schatzki's ring 18 mm balloon dilation, small hiatal hernia  8/27/21  allergy note cough chronic, has had work-up by pulmonary previously, number of positive testing on arrival allergy skin test,  patient declines immunotherapy, continue xyzal 5 mg, flonase, astelin and atrovent nasal, did not tolerate singulair, and can add allegra 180 mg      Coronary artery calcification  6/17/20 CT high-resolution thorax,coronary calcifications  10/12/20 CT thorax atherosclerosis with coronary calcification  11/12/20 CT coronary calcium score: LMA 0.0, LCx 211.5, .3, .5, .2 = 1169.4, probable dilation left ventricle and left atrium, linear density lingula and left lower lobe consistent with scar or atelectasis  3/16/21 stress echo negative for ischemia, appropriate augmentation LV function after maximal exercise, EF 60%  4/13/21 ultrasound carotid <50% bilateral stenosis     Allergic rhinitis  10/28/20  allergy note cough, proceed with aeroallergens skin testing, continue flonase, start sinus saline rinse, consider ENT or GI referral if allergy testing is negative, consider trial of singulair or atrovent nasal spray  11/3/20  allergy note, aeroallergy skin test positive to trees, grasses, weeds, mold, cat, guinea pig, horse, rabbit, dust mite, recommend addition of astelin nasal spray to montelukast  2/16/21  allergy note cough, positive aeroallergens skin test on 11/3/20 positive to trees, grasses, weeds, one mold, cat, guinea pig, horse, rabbit and dust mite, continue xyxal, astelin, atrovent nasal, flonase, trial of stopping montelukast to see if fatigue improves, possible ENT evaluation chronic sinusitis, continue pantoprazole  3/10/21  ENT note flexible laryngoscopy, right nasal septal deviation, interarytenoid edema, cough is better with aggressive nasal regimen and PPI, follow-up GI for dysphagia, will obtain CT sinus, continue rinses bid, PPI before dinner, stop astelin  4/23/21 CT maxillofacial without plus reconstruction, no evidence of sinus disease, convex left nasal septal deviation  4/29/21 [facility] note previous aeroallergens skin test positive to  trees, grasses, weeds, mold, cat, guinea pig, horse, rabbit and dust mite, cough is improved with ipratropium and astelin, xyzal, pantoprazole, off montelukast, with improvement in symptoms patient would like to hold off on immunotherapy, continue xyzal consider adding allegra 180 mg in am, follow-up ENT chronic sinusitis  5/19/21 [facility] note chronic cough, suspect more allergy related given improvement after allergy sprays, although she did start PPI at the same time, also has had esophageal dysphagia on occasion with solid foods, will schedule EGD for evaluation  8/27/21  allergy note cough chronic, has had work-up by pulmonary previously, number of positive testing on arrival allergy skin test, patient declines immunotherapy, continue xyzal 5 mg, flonase, astelin and atrovent nasal, did not tolerate singulair, and can add allegra 180 mg         Depression Screening    Little interest or pleasure in doing things?  0 - not at all  Feeling down, depressed , or hopeless? 0 - not at all  Patient Health Questionnaire Score: 0     If depressive symptoms identified deferred to follow up visit unless specifically addressed in assessment and plan.    Interpretation of PHQ-9 Total Score   Score Severity   1-4 No Depression   5-9 Mild Depression   10-14 Moderate Depression   15-19 Moderately Severe Depression   20-27 Severe Depression    Screening for Cognitive Impairment    Three Minute Recall (captain, garden, picture) 3/3    Augusto clock face with all 12 numbers and set the hands to show 5 past 8.  Yes    Cognitive concerns identified deferred for follow up unless specifically addressed in assessment and plan.    Fall Risk Assessment    Has the patient had two or more falls in the last year or any fall with injury in the last year?  No    Safety Assessment    Throw rugs on floor.  Yes  Handrails on all stairs.  No  Good lighting in all hallways.  Yes  Difficulty hearing.  No  Patient counseled about all safety risks  that were identified.    Functional Assessment ADLs    Are there any barriers preventing you from cooking for yourself or meeting nutritional needs?  No.    Are there any barriers preventing you from driving safely or obtaining transportation?  No.    Are there any barriers preventing you from using a telephone or calling for help?  No.    Are there any barriers preventing you from shopping?  No.    Are there any barriers preventing you from taking care of your own finances?  No.    Are there any barriers preventing you from managing your medications?  No.    Are there any barriers preventing you from showering, bathing or dressing yourself?  No.    Are you currently engaging in any exercise or physical activity?  No.     What is your perception of your health?  Good.      Health Maintenance Summary          Overdue - Annual Wellness Visit (Every 366 Days) Overdue since 1/6/2021 01/06/2020  Visit Dx: Medicare annual wellness visit, subsequent    10/11/2018  Visit Dx: Medicare annual wellness visit, subsequent    07/18/2017  Visit Dx: Medicare annual wellness visit, subsequent    05/10/2016  Visit Dx: Medicare annual wellness visit, subsequent          Scheduled - MAMMOGRAM (Yearly) Scheduled for 1/4/2022 06/17/2020  MA-SCREENING MAMMO BILAT W/TOMOSYNTHESIS W/CAD    09/11/2018  MA-SCREENING MAMMO BILAT W/TOMOSYNTHESIS W/CAD    09/05/2017  MA-MAMMO SCREENING BILAT W/GIOVANNI W/CAD    07/14/2016  MA-SCREEN MAMMO W/CAD-BILAT    06/12/2015  MA-SCREENING MAMMOGRAM W/ CAD    Only the first 5 history entries have been loaded, but more history exists.          IMM DTaP/Tdap/Td Vaccine (2 - Td or Tdap) Next due on 1/19/2022 01/19/2012  Imm Admin: Tdap Vaccine          COLORECTAL CANCER SCREENING (COLONOSCOPY - Every 3 Years) Next due on 9/6/2024 09/06/2021  REFERRAL TO GI FOR COLONOSCOPY    08/25/2021  REFERRAL TO GI FOR COLONOSCOPY    10/20/2017  REFERRAL TO GI FOR COLONOSCOPY          BONE DENSITY (Every 5 Years)  Tentatively due on 6/17/2025 06/17/2020  DS-BONE DENSITY STUDY (DEXA)    09/05/2017  DS-BONE DENSITY STUDY (DEXA)    06/12/2015  DS-BONE DENSITY STUDY (DEXA)    07/06/2011  DS-BONE DENSITY STUDY (DEXA)    10/17/2008  DS-BONE DENSITY STUDY (DEXA)    Only the first 5 history entries have been loaded, but more history exists.          IMM PNEUMOCOCCAL VACCINE: 65+ Years (Series Information) Completed    02/05/2015  Imm Admin: Pneumococcal Conjugate Vaccine (Prevnar/PCV-13)    01/19/2012  Imm Admin: Pneumococcal polysaccharide vaccine (PPSV-23)          IMM ZOSTER VACCINES (Series Information) Completed    02/05/2020  Imm Admin: Zoster Vaccine Recombinant (RZV) (SHINGRIX)    02/05/2020  Imm Admin: Zoster Vaccine Recombinant (RZV) (SHINGRIX)    11/13/2019  Imm Admin: Zoster Vaccine Recombinant (RZV) (SHINGRIX)    03/04/2014  Imm Admin: Zoster Vaccine Live (ZVL) (Zostavax) - HISTORICAL DATA          HEPATITIS C SCREENING  Completed    06/11/2020  HEP C VIRUS ANTIBODY          IMM INFLUENZA (Series Information) Completed    09/28/2021  Imm Admin: Influenza Vaccine Adult HD    10/11/2019  Imm Admin: Influenza Vaccine Adult HD    09/07/2018  Imm Admin: Influenza Vaccine Adult HD    09/25/2017  Imm Admin: Influenza Vaccine Adult HD    10/25/2016  Imm Admin: Influenza Vaccine Adult HD    Only the first 5 history entries have been loaded, but more history exists.          COVID-19 Vaccine (Series Information) Completed    10/11/2021  Imm Admin: Pfizer SARS-CoV-2 Vaccine 12+    10/11/2021  Imm Admin: Pfizer SARS-CoV-2 Vaccine    02/12/2021  Imm Admin: Pfizer SARS-CoV-2 Vaccine    01/22/2021  Imm Admin: Pfizer SARS-CoV-2 Vaccine          IMM HEP B VACCINE (Series Information) Aged Out    No completion history exists for this topic.          IMM MENINGOCOCCAL VACCINE (MCV4) (Series Information) Aged Out    No completion history exists for this topic.          Discontinued - PAP SMEAR  Discontinued    11/27/2018  PATHOLOGY GYN  "SPECIMEN    06/21/2016  PATHOLOGY GYN SPECIMEN                Patient Care Team:  Asael Redd M.D. as PCP - General  Jeimy Huntley M.D. as Consulting Physician (OB & Gyn - Gynecology)  Carolyn Ventura M.D. as Consulting Physician (Endocrinology)  Mejia Saez O.D. as Consulting Physician (Optometry)  Charu Spann M.D. as Consulting Physician (Dermatology)  Jose Alejandro Godinez M.D. as Consulting Physician (Ophthalmology)  Viraj Rodriguez M.D. as Consulting Physician (Ophthalmology)  Fred Fuentes M.D. as Consulting Physician (Ophthalmology)      ROS           Objective     /82 (BP Location: Right arm, Patient Position: Sitting, BP Cuff Size: Adult)   Pulse 74   Temp 36.3 °C (97.4 °F)   Ht 1.626 m (5' 4\")   Wt 68 kg (150 lb)   SpO2 91%   BMI 25.75 kg/m²      Physical Exam  Vitals and nursing note reviewed.   Constitutional:       Appearance: Normal appearance.   HENT:      Head: Normocephalic and atraumatic.      Right Ear: External ear normal.      Left Ear: External ear normal.   Eyes:      Conjunctiva/sclera: Conjunctivae normal.   Cardiovascular:      Rate and Rhythm: Normal rate and regular rhythm.      Heart sounds: Normal heart sounds.   Pulmonary:      Effort: Pulmonary effort is normal.      Breath sounds: Normal breath sounds.   Abdominal:      General: There is no distension.   Musculoskeletal:         General: No swelling.      Cervical back: Neck supple.   Skin:     General: Skin is warm.   Neurological:      General: No focal deficit present.      Mental Status: She is alert.   Psychiatric:         Mood and Affect: Mood normal.         Behavior: Behavior normal.                    Assessment & Plan        Assessment  #! Medicare wellness visit    #2 history of thyroid cancer status post thyroidectomy, follow-up endocrinology on levothyroxine, last TSH 1.1    #3 allergic rhinitis followed by  allergy immunology, on Astelin, Flonase, Atrovent nasal, Allegra, Xyzal " cause drowsiness    #4 CAD 11/12/20 CT coronary calcium score: LMA 0.0, LCx 211.5, .3, .5, .2 = 1169.4, probable dilation left ventricle and left atrium, linear density lingula and left lower lobe consistent with scar or atelectasis, subsequent stress echo test 3/16/21 stress echo negative for ischemia, appropriate augmentation LV function after maximal exercise, EF 60%    #5 chronic cough has seen ENT, allergy, GI thought to be component of allergic rhinitis, gastroesophageal reflux although she has been taking pantoprazole without benefit in her symptoms    #6 dyslipidemia on Lipitor 2/8/21 chol 143,trig 151,hdl 41,ldl 72 on lipitor 80 mg    #7 history of depression no recurrence off Wellbutrin    #8 impaired glucose metabolism A1c 5.8%    #9 postmenopausal bone loss 6/18/20 dexa LS-0.6,hip-1.4    #10 pulmonary nodule followed by pulmonary most recent CT 5/27/21 CT thorax without, stable less than 6 mm biapical groundglass nodules, emphysematous changes lungs    #11 vitamin D deficiency    #12 preventive health  1/19/12 pneumovax  1/19/12 tdap  3/5/14 zoster vaccine  2/5/15 prevnar  11/14/18 sonocine negative  11/27/18 pap per gyn   4/7/19 YOLANDA negative, EKG sinus, ultrasound aorta mild atherosclerotic changes, ultrasound carotid mild left plaque bulb done at [facility]  6/11/20 hep c ab negative   6/11/20 vit d 44  6/18/20 dexa LS-0.6,hip-1.4  6/18/20 mammogram heterogeneously dense breast tissue, offered screening breast ultrasound  2/5/20 shingrix second done  2/12/21 covid pfizer second  8/25/21 colonoscopy per [facility] three adenomas, repeat in 3 years  10/11/21 covid pfizer booster    #13 occasional palpitation    #14 elevated blood pressure today but after numerous other physician appointments her blood pressure is normally in the 120s    Plan  #1 stop protonix and see if cough is worse after discontinuation of medication, continue nasal sprays and Allegra    #2 old records   endocrine    #3 old records  dermatology     #4 labs     #5 check blood pressure at home daily for the next two weeks and send me an update on her readings, she walks 3 days a week, advised her to try to get 1 extra day a week for 20 minutes of walking and next month increase that to 2 extra days a week if possible in addition to her standard previous week 40 minutes at a time     #6 follow-up with allergy     #7 up-to-date on influenza and COVID vaccine series plus booster    #8 she has her mammogram scheduled    #9 continue work on nutrition, limit sweets, candies, processed foods    #10 no need for hearing test or physical therapy    #11 health maintenance reviewed and updated

## 2021-12-07 NOTE — LETTER
[facility]  Asael Redd M.D.  [address]  [place] [address]  Fax: [phone]   Authorization for Release/Disclosure of   Protected Health Information   Name: LEONILA MAYS : 1945 SSN: [id number]   Address: [facility]  [place] [address] Phone:    [phone] (home)    I authorize the entity listed below to release/disclose the PHI below to:   [facility]/Asael Redd M.D. and Asael Redd M.D.   Provider or Entity Name:   dermatology   Address   City, State, Zip   Phone:      Fax:  [phone]   Reason for request: continuity of care   Information to be released:    [  ] LAST COLONOSCOPY,  including any PATH REPORT and follow-up  [  ] LAST FIT/COLOGUARD RESULT [  ] LAST DEXA  [  ] LAST MAMMOGRAM  [  ] LAST PAP  [  ] LAST LABS [  ] RETINA EXAM REPORT  [  ] IMMUNIZATION RECORDS  [ xxxx ] Release all info from last year      [  ] Check here and initial the line next to each item to release ALL health information INCLUDING  _____ Care and treatment for drug and / or alcohol abuse  _____ HIV testing, infection status, or AIDS  _____ Genetic Testing    DATES OF SERVICE OR TIME PERIOD TO BE DISCLOSED: _____________  I understand and acknowledge that:  * This Authorization may be revoked at any time by you in writing, except if your health information has already been used or disclosed.  * Your health information that will be used or disclosed as a result of you signing this authorization could be re-disclosed by the recipient. If this occurs, your re-disclosed health information may no longer be protected by State or Federal laws.  * You may refuse to sign this Authorization. Your refusal will not affect your ability to obtain treatment.  * This Authorization becomes effective upon signing and will  on (date) __________.      If no date is indicated, this Authorization will  one (1) year from the signature date.    Name: Leonila  Pippa    Signature:continuity of care   Date:     12/9/2021       PLEASE FAX REQUESTED RECORDS BACK TO: [phone]

## 2021-12-07 NOTE — TELEPHONE ENCOUNTER
[facility] old records   (1)  allergy for the past year  (2)  endocrinology for the past year   (3)  dermatology for the past year

## 2021-12-07 NOTE — LETTER
[facility]  Asael Redd M.D.  [address]  [place] [address]  Fax: [phone]   Authorization for Release/Disclosure of   Protected Health Information   Name: LEONILA MAYS : 1945 SSN: [id number]   [address]  [place] [address] Phone:    [phone] (home)    I authorize the entity listed below to release/disclose the PHI below to:   [facility]/Asael Redd M.D. and Asael Redd M.D.   Provider or Entity Name:   allergy    Address   City, State, [facility]   Phone:      Fax:  [phone]   Reason for request: continuity of care   Information to be released:    [  ] LAST COLONOSCOPY,  including any PATH REPORT and follow-up  [  ] LAST FIT/COLOGUARD RESULT [  ] LAST DEXA  [  ] LAST MAMMOGRAM  [  ] LAST PAP  [  ] LAST LABS [  ] RETINA EXAM REPORT  [  ] IMMUNIZATION RECORDS  [ xxxxx ] Release all info for past year      [  ] Check here and initial the line next to each item to release ALL health information INCLUDING  _____ Care and treatment for drug and / or alcohol abuse  _____ HIV testing, infection status, or AIDS  _____ Genetic Testing    DATES OF SERVICE OR TIME PERIOD TO BE DISCLOSED: _____________  I understand and acknowledge that:  * This Authorization may be revoked at any time by you in writing, except if your health information has already been used or disclosed.  * Your health information that will be used or disclosed as a result of you signing this authorization could be re-disclosed by the recipient. If this occurs, your re-disclosed health information may no longer be protected by State or Federal laws.  * You may refuse to sign this Authorization. Your refusal will not affect your ability to obtain treatment.  * This Authorization becomes effective upon signing and will  on (date) __________.      If no date is indicated, this Authorization will  one (1) year from the signature date.    Name: Leonila  Pippa    Signature:continuity of care   Date:     12/9/2021       PLEASE FAX REQUESTED RECORDS BACK TO: [phone]

## 2022-01-04 ENCOUNTER — HOSPITAL ENCOUNTER (OUTPATIENT)
Dept: RADIOLOGY | Facility: MEDICAL CENTER | Age: 77
End: 2022-01-04
Attending: INTERNAL MEDICINE
Payer: MEDICARE

## 2022-01-04 DIAGNOSIS — Z12.31 VISIT FOR SCREENING MAMMOGRAM: ICD-10-CM

## 2022-01-04 PROCEDURE — 77063 BREAST TOMOSYNTHESIS BI: CPT

## 2022-01-05 ENCOUNTER — TELEPHONE (OUTPATIENT)
Dept: MEDICAL GROUP | Facility: MEDICAL CENTER | Age: 77
End: 2022-01-05

## 2022-01-06 ENCOUNTER — TELEPHONE (OUTPATIENT)
Dept: MEDICAL GROUP | Facility: MEDICAL CENTER | Age: 77
End: 2022-01-06

## 2022-01-06 DIAGNOSIS — R92.2 DENSE BREAST: ICD-10-CM

## 2022-01-06 DIAGNOSIS — R92.30 DENSE BREAST: ICD-10-CM

## 2022-01-06 NOTE — TELEPHONE ENCOUNTER
----- Message from Asael Redd M.D. sent at 1/5/2022  3:49 PM PST -----  Please notify the patient that:  (1) her mammogram is negative but does show dense breast tissue which may decrease the accuracy of the mammogram  (2) she may obtain a screening breast ultrasound which could provide further detail  (3) her insurance may not cover the screening breast ultrasound, if that is the case, then the out of pocket cost is approximately $125  (4) please let me know if she is interested in obtaining the screening breast ultrasound

## 2022-02-03 ENCOUNTER — HOSPITAL ENCOUNTER (OUTPATIENT)
Dept: RADIOLOGY | Facility: MEDICAL CENTER | Age: 77
End: 2022-02-03
Attending: INTERNAL MEDICINE
Payer: MEDICARE

## 2022-02-03 DIAGNOSIS — R92.30 DENSE BREAST: ICD-10-CM

## 2022-02-03 DIAGNOSIS — R92.2 DENSE BREAST: ICD-10-CM

## 2022-02-03 PROCEDURE — 76641 ULTRASOUND BREAST COMPLETE: CPT

## 2022-02-04 ENCOUNTER — TELEPHONE (OUTPATIENT)
Dept: MEDICAL GROUP | Facility: MEDICAL CENTER | Age: 77
End: 2022-02-04

## 2022-02-07 ENCOUNTER — TELEPHONE (OUTPATIENT)
Dept: MEDICAL GROUP | Facility: MEDICAL CENTER | Age: 77
End: 2022-02-07

## 2022-02-07 NOTE — TELEPHONE ENCOUNTER
----- Message from Asael Redd M.D. sent at 2/4/2022  6:23 PM PST -----  Please notify the patient that her screening breast ultrasound is negative

## 2022-02-11 ENCOUNTER — HOSPITAL ENCOUNTER (OUTPATIENT)
Dept: LAB | Facility: MEDICAL CENTER | Age: 77
End: 2022-02-11
Attending: INTERNAL MEDICINE
Payer: MEDICARE

## 2022-02-11 ENCOUNTER — TELEPHONE (OUTPATIENT)
Dept: MEDICAL GROUP | Facility: MEDICAL CENTER | Age: 77
End: 2022-02-11

## 2022-02-11 DIAGNOSIS — R73.09 IMPAIRED GLUCOSE METABOLISM: ICD-10-CM

## 2022-02-11 DIAGNOSIS — E55.9 VITAMIN D DEFICIENCY: ICD-10-CM

## 2022-02-11 DIAGNOSIS — E78.5 DYSLIPIDEMIA: ICD-10-CM

## 2022-02-11 LAB
25(OH)D3 SERPL-MCNC: 43 NG/ML (ref 30–100)
ALBUMIN SERPL BCP-MCNC: 4.2 G/DL (ref 3.2–4.9)
ALBUMIN/GLOB SERPL: 1.8 G/DL
ALP SERPL-CCNC: 109 U/L (ref 30–99)
ALT SERPL-CCNC: 14 U/L (ref 2–50)
ANION GAP SERPL CALC-SCNC: 8 MMOL/L (ref 7–16)
AST SERPL-CCNC: 18 U/L (ref 12–45)
BASOPHILS # BLD AUTO: 0.6 % (ref 0–1.8)
BASOPHILS # BLD: 0.04 K/UL (ref 0–0.12)
BILIRUB SERPL-MCNC: 0.7 MG/DL (ref 0.1–1.5)
BUN SERPL-MCNC: 15 MG/DL (ref 8–22)
CALCIUM SERPL-MCNC: 9.3 MG/DL (ref 8.5–10.5)
CHLORIDE SERPL-SCNC: 105 MMOL/L (ref 96–112)
CHOLEST SERPL-MCNC: 174 MG/DL (ref 100–199)
CO2 SERPL-SCNC: 27 MMOL/L (ref 20–33)
CREAT SERPL-MCNC: 0.43 MG/DL (ref 0.5–1.4)
EOSINOPHIL # BLD AUTO: 0.09 K/UL (ref 0–0.51)
EOSINOPHIL NFR BLD: 1.4 % (ref 0–6.9)
ERYTHROCYTE [DISTWIDTH] IN BLOOD BY AUTOMATED COUNT: 44 FL (ref 35.9–50)
EST. AVERAGE GLUCOSE BLD GHB EST-MCNC: 128 MG/DL
FASTING STATUS PATIENT QL REPORTED: NORMAL
GLOBULIN SER CALC-MCNC: 2.3 G/DL (ref 1.9–3.5)
GLUCOSE SERPL-MCNC: 103 MG/DL (ref 65–99)
HBA1C MFR BLD: 6.1 % (ref 4–5.6)
HCT VFR BLD AUTO: 40.4 % (ref 37–47)
HDLC SERPL-MCNC: 50 MG/DL
HGB BLD-MCNC: 13.6 G/DL (ref 12–16)
IMM GRANULOCYTES # BLD AUTO: 0.01 K/UL (ref 0–0.11)
IMM GRANULOCYTES NFR BLD AUTO: 0.2 % (ref 0–0.9)
LDLC SERPL CALC-MCNC: 102 MG/DL
LYMPHOCYTES # BLD AUTO: 1.73 K/UL (ref 1–4.8)
LYMPHOCYTES NFR BLD: 27.7 % (ref 22–41)
MCH RBC QN AUTO: 30.4 PG (ref 27–33)
MCHC RBC AUTO-ENTMCNC: 33.7 G/DL (ref 33.6–35)
MCV RBC AUTO: 90.2 FL (ref 81.4–97.8)
MONOCYTES # BLD AUTO: 0.52 K/UL (ref 0–0.85)
MONOCYTES NFR BLD AUTO: 8.3 % (ref 0–13.4)
NEUTROPHILS # BLD AUTO: 3.86 K/UL (ref 2–7.15)
NEUTROPHILS NFR BLD: 61.8 % (ref 44–72)
NRBC # BLD AUTO: 0 K/UL
NRBC BLD-RTO: 0 /100 WBC
PLATELET # BLD AUTO: 263 K/UL (ref 164–446)
PMV BLD AUTO: 9.8 FL (ref 9–12.9)
POTASSIUM SERPL-SCNC: 3.8 MMOL/L (ref 3.6–5.5)
PROT SERPL-MCNC: 6.5 G/DL (ref 6–8.2)
RBC # BLD AUTO: 4.48 M/UL (ref 4.2–5.4)
SODIUM SERPL-SCNC: 140 MMOL/L (ref 135–145)
T4 FREE SERPL-MCNC: 1.44 NG/DL (ref 0.93–1.7)
TRIGL SERPL-MCNC: 110 MG/DL (ref 0–149)
TSH SERPL DL<=0.005 MIU/L-ACNC: 0.94 UIU/ML (ref 0.38–5.33)
WBC # BLD AUTO: 6.3 K/UL (ref 4.8–10.8)

## 2022-02-11 PROCEDURE — 84439 ASSAY OF FREE THYROXINE: CPT

## 2022-02-11 PROCEDURE — 84443 ASSAY THYROID STIM HORMONE: CPT

## 2022-02-11 PROCEDURE — 80061 LIPID PANEL: CPT

## 2022-02-11 PROCEDURE — 85025 COMPLETE CBC W/AUTO DIFF WBC: CPT

## 2022-02-11 PROCEDURE — 82306 VITAMIN D 25 HYDROXY: CPT

## 2022-02-11 PROCEDURE — 83036 HEMOGLOBIN GLYCOSYLATED A1C: CPT | Mod: GA

## 2022-02-11 PROCEDURE — 36415 COLL VENOUS BLD VENIPUNCTURE: CPT | Mod: GA

## 2022-02-11 PROCEDURE — 80053 COMPREHEN METABOLIC PANEL: CPT

## 2022-02-12 NOTE — TELEPHONE ENCOUNTER
----- Message from Asael Redd M.D. sent at 2/11/2022  4:05 PM PST -----  Called the patient left a message, please let her know that her test shows:  (1) her cholesterol is 174, good cholesterol is 50 (goal is above 40), bad cholesterol is 102 (goal is less than 100), her bad cholesterol is increased from 72 to 102, is she taking the atorvastatin medication every day?  (2) her fasting blood sugar is 103 which is at the upper end of normal but her 90-day blood sugar average is 6.1% normal being less than 5.7%, the test shows she does have prediabetes, no medications are necessary but she needs to watch her carbohydrate portion sizes, limit sweets, and candies  (3) her liver function, kidney function, vitamin D levels are normal  (4) please let me know about her cholesterol medication (if she is taking it daily)

## 2022-02-12 NOTE — TELEPHONE ENCOUNTER
Called the patient left a message, please let her know that her test shows:  (1) her cholesterol is 174, good cholesterol is 50 (goal is above 40), bad cholesterol is 102 (goal is less than 100), her bad cholesterol is increased from 72 to 102, is she taking the atorvastatin medication every day?  (2) her fasting blood sugar is 103 which is at the upper end of normal but her 90-day blood sugar average is 6.1% normal being less than 5.7%, the test shows she does have prediabetes, no medications are necessary but she needs to watch her carbohydrate portion sizes, limit sweets, and candies  (3) her liver function, kidney function, vitamin D levels are normal  (4) please let me know about her cholesterol medication (if she is taking it daily)

## 2022-02-15 NOTE — TELEPHONE ENCOUNTER
Pt notified of results and recommendations. States that she is taking her Atorvastatin every day as prescribed.

## 2022-02-18 NOTE — TELEPHONE ENCOUNTER
Thank you. Tell the patient that I would like her to continue on the atorvastatin, since that is the highest dose of that medication. Additioanlly, I would like to add a second cholesterol pill called ezetimibe (Zetia) that will help decrease the cholesterol absorption in the stomach.     We could add that to the atorvastatin. The main side effects would be some occasional bloating or gas but this is very well-tolerated.    If she is in agreement let me know and I can send a prescription to her pharmacy and we will need to repeat her blood test in 1 to 2 months.

## 2022-02-25 ENCOUNTER — PATIENT MESSAGE (OUTPATIENT)
Dept: MEDICAL GROUP | Facility: MEDICAL CENTER | Age: 77
End: 2022-02-25
Payer: MEDICARE

## 2022-02-25 ENCOUNTER — TELEPHONE (OUTPATIENT)
Dept: MEDICAL GROUP | Facility: MEDICAL CENTER | Age: 77
End: 2022-02-25
Payer: MEDICARE

## 2022-02-25 DIAGNOSIS — E78.5 DYSLIPIDEMIA: ICD-10-CM

## 2022-02-25 DIAGNOSIS — E87.6 LOW BLOOD POTASSIUM: ICD-10-CM

## 2022-02-25 RX ORDER — EZETIMIBE 10 MG/1
10 TABLET ORAL DAILY
Qty: 30 TABLET | Refills: 2 | Status: SHIPPED | OUTPATIENT
Start: 2022-02-25 | End: 2022-05-31 | Stop reason: SDUPTHER

## 2022-02-25 RX ORDER — POTASSIUM CHLORIDE 750 MG/1
10 TABLET, EXTENDED RELEASE ORAL
Qty: 90 TABLET | Refills: 3 | Status: SHIPPED | OUTPATIENT
Start: 2022-02-25 | End: 2022-05-26

## 2022-02-25 RX ORDER — EZETIMIBE 10 MG/1
10 TABLET ORAL DAILY
Qty: 30 TABLET | Refills: 2 | Status: SHIPPED | OUTPATIENT
Start: 2022-02-25 | End: 2022-02-25

## 2022-02-25 NOTE — TELEPHONE ENCOUNTER
----- Message from Ping Das sent at 2/25/2022 11:25 AM PST -----  Regarding: Prescription renewal  Jan 1, 2022, I changed RX insurance and pharmacies.  When my prescriptions were transferred, Potassium was not because no refills were left on the order.  I am now ready to refill, please place an order for Potassium CL ER 10 MEQ tablets (90-day) to [facility], [facility].  Thank you.  Ping Das

## 2022-03-16 ENCOUNTER — APPOINTMENT (RX ONLY)
Dept: URBAN - METROPOLITAN AREA CLINIC 4 | Facility: CLINIC | Age: 77
Setting detail: DERMATOLOGY
End: 2022-03-16

## 2022-03-16 DIAGNOSIS — D22 MELANOCYTIC NEVI: ICD-10-CM

## 2022-03-16 DIAGNOSIS — L90.5 SCAR CONDITIONS AND FIBROSIS OF SKIN: ICD-10-CM

## 2022-03-16 DIAGNOSIS — L57.0 ACTINIC KERATOSIS: ICD-10-CM

## 2022-03-16 DIAGNOSIS — Z85.828 PERSONAL HISTORY OF OTHER MALIGNANT NEOPLASM OF SKIN: ICD-10-CM

## 2022-03-16 DIAGNOSIS — L82.1 OTHER SEBORRHEIC KERATOSIS: ICD-10-CM

## 2022-03-16 DIAGNOSIS — L81.4 OTHER MELANIN HYPERPIGMENTATION: ICD-10-CM | Status: STABLE

## 2022-03-16 PROBLEM — D22.5 MELANOCYTIC NEVI OF TRUNK: Status: ACTIVE | Noted: 2022-03-16

## 2022-03-16 PROCEDURE — ? COUNSELING

## 2022-03-16 PROCEDURE — 99213 OFFICE O/P EST LOW 20 MIN: CPT | Mod: 25

## 2022-03-16 PROCEDURE — ? DIAGNOSIS COMMENT

## 2022-03-16 PROCEDURE — ? LIQUID NITROGEN

## 2022-03-16 PROCEDURE — ? OBSERVATION AND MEASURE

## 2022-03-16 PROCEDURE — 17000 DESTRUCT PREMALG LESION: CPT

## 2022-03-16 PROCEDURE — 17003 DESTRUCT PREMALG LES 2-14: CPT

## 2022-03-16 ASSESSMENT — LOCATION SIMPLE DESCRIPTION DERM
LOCATION SIMPLE: LEFT THIGH
LOCATION SIMPLE: LEFT PRETIBIAL REGION
LOCATION SIMPLE: LEFT ZYGOMA
LOCATION SIMPLE: LEFT FOREARM
LOCATION SIMPLE: RIGHT BUTTOCK
LOCATION SIMPLE: LEFT BUTTOCK
LOCATION SIMPLE: RIGHT PRETIBIAL REGION
LOCATION SIMPLE: LEFT UPPER BACK
LOCATION SIMPLE: RIGHT HAND
LOCATION SIMPLE: RIGHT THIGH
LOCATION SIMPLE: RIGHT ANKLE

## 2022-03-16 ASSESSMENT — LOCATION DETAILED DESCRIPTION DERM
LOCATION DETAILED: LEFT PROXIMAL PRETIBIAL REGION
LOCATION DETAILED: RIGHT MEDIAL POSTERIOR ANKLE
LOCATION DETAILED: RIGHT ANTERIOR DISTAL THIGH
LOCATION DETAILED: LEFT BUTTOCK
LOCATION DETAILED: LEFT DISTAL RADIAL DORSAL FOREARM
LOCATION DETAILED: RIGHT BUTTOCK
LOCATION DETAILED: RIGHT DISTAL PRETIBIAL REGION
LOCATION DETAILED: RIGHT RADIAL DORSAL HAND
LOCATION DETAILED: RIGHT ANTERIOR LATERAL DISTAL THIGH
LOCATION DETAILED: LEFT CENTRAL ZYGOMA
LOCATION DETAILED: LEFT ANTERIOR LATERAL DISTAL THIGH
LOCATION DETAILED: LEFT SUPERIOR UPPER BACK

## 2022-03-16 ASSESSMENT — LOCATION ZONE DERM
LOCATION ZONE: TRUNK
LOCATION ZONE: LEG
LOCATION ZONE: FACE
LOCATION ZONE: HAND
LOCATION ZONE: ARM

## 2022-03-16 NOTE — PROCEDURE: DIAGNOSIS COMMENT
Comment: History of skin cancers
Detail Level: Detailed
Comment: See Photo from 09/12/2018
Comment: [id number]
Render Risk Assessment In Note?: no

## 2022-05-17 ENCOUNTER — TELEPHONE (OUTPATIENT)
Dept: MEDICAL GROUP | Facility: MEDICAL CENTER | Age: 77
End: 2022-05-17
Payer: MEDICARE

## 2022-05-17 DIAGNOSIS — E78.5 DYSLIPIDEMIA: Chronic | ICD-10-CM

## 2022-05-17 RX ORDER — ATORVASTATIN CALCIUM 80 MG/1
TABLET, FILM COATED ORAL
Qty: 90 TABLET | Refills: 1 | Status: SHIPPED | OUTPATIENT
Start: 2022-05-17 | End: 2022-11-27

## 2022-05-17 NOTE — TELEPHONE ENCOUNTER
Please notify the patient that she is due for her fasting blood test, this is to recheck her cholesterol.  The orders are in the computer system.

## 2022-05-26 ENCOUNTER — TELEPHONE (OUTPATIENT)
Dept: MEDICAL GROUP | Facility: MEDICAL CENTER | Age: 77
End: 2022-05-26

## 2022-05-26 ENCOUNTER — HOSPITAL ENCOUNTER (OUTPATIENT)
Dept: LAB | Facility: MEDICAL CENTER | Age: 77
End: 2022-05-26
Attending: INTERNAL MEDICINE
Payer: MEDICARE

## 2022-05-26 DIAGNOSIS — E78.5 DYSLIPIDEMIA: ICD-10-CM

## 2022-05-26 LAB
ALBUMIN SERPL BCP-MCNC: 4.2 G/DL (ref 3.2–4.9)
ALP SERPL-CCNC: 101 U/L (ref 30–99)
ALT SERPL-CCNC: 17 U/L (ref 2–50)
AST SERPL-CCNC: 15 U/L (ref 12–45)
BILIRUB CONJ SERPL-MCNC: <0.2 MG/DL (ref 0.1–0.5)
BILIRUB INDIRECT SERPL-MCNC: ABNORMAL MG/DL (ref 0–1)
BILIRUB SERPL-MCNC: 0.8 MG/DL (ref 0.1–1.5)
CHOLEST SERPL-MCNC: 149 MG/DL (ref 100–199)
FASTING STATUS PATIENT QL REPORTED: NORMAL
HDLC SERPL-MCNC: 51 MG/DL
LDLC SERPL CALC-MCNC: 77 MG/DL
PROT SERPL-MCNC: 6.1 G/DL (ref 6–8.2)
TRIGL SERPL-MCNC: 106 MG/DL (ref 0–149)

## 2022-05-26 PROCEDURE — 80076 HEPATIC FUNCTION PANEL: CPT

## 2022-05-26 PROCEDURE — 80061 LIPID PANEL: CPT

## 2022-05-26 PROCEDURE — 36415 COLL VENOUS BLD VENIPUNCTURE: CPT

## 2022-05-26 NOTE — TELEPHONE ENCOUNTER
----- Message from Asael Redd M.D. sent at 5/26/2022 12:38 PM PDT -----  Called the patient and left a message, please notify her that her cholesterol is improved, total cholesterol is 149 compared to 174, good cholesterol is 51 (goal is above 40), bad cholesterol is 77 decreased from 102 with goal being less than 100.    I would like to continue her on the ezetimibe cholesterol medication.  If she would like a 90-day supply please let me know and I will send that to the pharmacy.

## 2022-05-26 NOTE — TELEPHONE ENCOUNTER
Called the patient and left a message, please notify her that her cholesterol is improved, total cholesterol is 149 compared to 174, good cholesterol is 51 (goal is above 40), bad cholesterol is 77 decreased from 102 with goal being less than 100.    I would like to continue her on the ezetimibe cholesterol medication.  If she would like a 90-day supply please let me know and I will send that to the pharmacy.

## 2022-05-27 NOTE — TELEPHONE ENCOUNTER
Thank you, she just filled the atorvastatin for 90 days but she had already been on that medication.  The new medication ezetimibe is only for 30 days, if she would like a 90-day supply of this new cholesterol medication please let me know.

## 2022-05-31 RX ORDER — EZETIMIBE 10 MG/1
10 TABLET ORAL DAILY
Qty: 90 TABLET | Refills: 1 | Status: SHIPPED | OUTPATIENT
Start: 2022-05-31 | End: 2023-05-28

## 2022-07-02 ENCOUNTER — HOSPITAL ENCOUNTER (OUTPATIENT)
Dept: RADIOLOGY | Facility: MEDICAL CENTER | Age: 77
End: 2022-07-02
Attending: INTERNAL MEDICINE
Payer: MEDICARE

## 2022-07-02 DIAGNOSIS — R91.8 PULMONARY NODULES: ICD-10-CM

## 2022-07-02 PROCEDURE — 71250 CT THORAX DX C-: CPT | Mod: ME

## 2022-08-15 ENCOUNTER — HOSPITAL ENCOUNTER (OUTPATIENT)
Dept: LAB | Facility: MEDICAL CENTER | Age: 77
End: 2022-08-15
Attending: INTERNAL MEDICINE
Payer: MEDICARE

## 2022-08-15 LAB
T4 FREE SERPL-MCNC: 1.56 NG/DL (ref 0.93–1.7)
TSH SERPL DL<=0.005 MIU/L-ACNC: 0.4 UIU/ML (ref 0.38–5.33)

## 2022-08-15 PROCEDURE — 36415 COLL VENOUS BLD VENIPUNCTURE: CPT

## 2022-08-15 PROCEDURE — 84439 ASSAY OF FREE THYROXINE: CPT

## 2022-08-15 PROCEDURE — 84443 ASSAY THYROID STIM HORMONE: CPT

## 2022-08-31 ENCOUNTER — APPOINTMENT (RX ONLY)
Dept: URBAN - METROPOLITAN AREA CLINIC 4 | Facility: CLINIC | Age: 77
Setting detail: DERMATOLOGY
End: 2022-08-31

## 2022-08-31 DIAGNOSIS — L81.4 OTHER MELANIN HYPERPIGMENTATION: ICD-10-CM | Status: STABLE

## 2022-08-31 DIAGNOSIS — Z85.828 PERSONAL HISTORY OF OTHER MALIGNANT NEOPLASM OF SKIN: ICD-10-CM

## 2022-08-31 DIAGNOSIS — L90.5 SCAR CONDITIONS AND FIBROSIS OF SKIN: ICD-10-CM

## 2022-08-31 DIAGNOSIS — D22 MELANOCYTIC NEVI: ICD-10-CM

## 2022-08-31 DIAGNOSIS — L82.1 OTHER SEBORRHEIC KERATOSIS: ICD-10-CM

## 2022-08-31 DIAGNOSIS — L20.89 OTHER ATOPIC DERMATITIS: ICD-10-CM

## 2022-08-31 PROBLEM — D48.5 NEOPLASM OF UNCERTAIN BEHAVIOR OF SKIN: Status: ACTIVE | Noted: 2022-08-31

## 2022-08-31 PROBLEM — D22.5 MELANOCYTIC NEVI OF TRUNK: Status: ACTIVE | Noted: 2022-08-31

## 2022-08-31 PROBLEM — L20.84 INTRINSIC (ALLERGIC) ECZEMA: Status: ACTIVE | Noted: 2022-08-31

## 2022-08-31 PROCEDURE — ? DIAGNOSIS COMMENT

## 2022-08-31 PROCEDURE — 99213 OFFICE O/P EST LOW 20 MIN: CPT | Mod: 25

## 2022-08-31 PROCEDURE — ? PRESCRIPTION

## 2022-08-31 PROCEDURE — ? OBSERVATION AND MEASURE

## 2022-08-31 PROCEDURE — ? BIOPSY BY SHAVE METHOD

## 2022-08-31 PROCEDURE — ? COUNSELING: TOPICAL STEROIDS

## 2022-08-31 PROCEDURE — ? ADDITIONAL NOTES

## 2022-08-31 PROCEDURE — ? COUNSELING

## 2022-08-31 PROCEDURE — 11102 TANGNTL BX SKIN SINGLE LES: CPT

## 2022-08-31 RX ORDER — TRIAMCINOLONE ACETONIDE 1 MG/G
CREAM TOPICAL BID
Qty: 30 | Refills: 0 | Status: ERX | COMMUNITY
Start: 2022-08-31

## 2022-08-31 RX ADMIN — TRIAMCINOLONE ACETONIDE 1: 1 CREAM TOPICAL at 00:00

## 2022-08-31 ASSESSMENT — LOCATION SIMPLE DESCRIPTION DERM
LOCATION SIMPLE: LOWER BACK
LOCATION SIMPLE: RIGHT BUTTOCK
LOCATION SIMPLE: LEFT ZYGOMA
LOCATION SIMPLE: RIGHT THIGH
LOCATION SIMPLE: LEFT UPPER BACK
LOCATION SIMPLE: RIGHT UPPER ARM
LOCATION SIMPLE: RIGHT PRETIBIAL REGION
LOCATION SIMPLE: LEFT BUTTOCK
LOCATION SIMPLE: RIGHT ANKLE

## 2022-08-31 ASSESSMENT — LOCATION DETAILED DESCRIPTION DERM
LOCATION DETAILED: RIGHT DISTAL PRETIBIAL REGION
LOCATION DETAILED: SUPERIOR LUMBAR SPINE
LOCATION DETAILED: LEFT BUTTOCK
LOCATION DETAILED: RIGHT BUTTOCK
LOCATION DETAILED: LEFT SUPERIOR UPPER BACK
LOCATION DETAILED: LEFT CENTRAL ZYGOMA
LOCATION DETAILED: RIGHT MEDIAL POSTERIOR ANKLE
LOCATION DETAILED: RIGHT ANTERIOR LATERAL DISTAL THIGH
LOCATION DETAILED: RIGHT ANTERIOR PROXIMAL UPPER ARM

## 2022-08-31 ASSESSMENT — LOCATION ZONE DERM
LOCATION ZONE: ARM
LOCATION ZONE: FACE
LOCATION ZONE: LEG
LOCATION ZONE: TRUNK

## 2022-08-31 NOTE — PROCEDURE: BIOPSY BY SHAVE METHOD

## 2022-08-31 NOTE — PROCEDURE: ADDITIONAL NOTES
Detail Level: Detailed
Additional Notes: New lesion, present x3 weeks, slowly improving.  F/u if fails to respond to treatment.
Render Risk Assessment In Note?: no

## 2022-08-31 NOTE — PROCEDURE: DIAGNOSIS COMMENT
Comment: [id number]
Detail Level: Detailed
Render Risk Assessment In Note?: no
Comment: See Photo from 09/12/2018
Comment: History of skin cancers

## 2022-10-07 ENCOUNTER — OFFICE VISIT (OUTPATIENT)
Dept: MEDICAL GROUP | Facility: MEDICAL CENTER | Age: 77
End: 2022-10-07

## 2022-10-07 VITALS
TEMPERATURE: 97.3 F | WEIGHT: 146.6 LBS | HEART RATE: 63 BPM | OXYGEN SATURATION: 97 % | DIASTOLIC BLOOD PRESSURE: 60 MMHG | SYSTOLIC BLOOD PRESSURE: 132 MMHG | HEIGHT: 64 IN | BODY MASS INDEX: 25.03 KG/M2

## 2022-10-07 DIAGNOSIS — Z02.4 ENCOUNTER FOR DRIVER'S LICENSE HISTORY AND PHYSICAL: ICD-10-CM

## 2022-10-07 DIAGNOSIS — Z02.89 ENCOUNTER FOR COMPLETION OF FORM WITH PATIENT: ICD-10-CM

## 2022-10-07 PROCEDURE — 7105 PR DMV PHYSICAL: Performed by: INTERNAL MEDICINE

## 2022-10-07 ASSESSMENT — FIBROSIS 4 INDEX: FIB4 SCORE: 1.05

## 2022-10-07 NOTE — PROGRESS NOTES
Established Patient    Ping presents today with the following:    CC: DMV license renewal evaluation    HPI:   Ping is a 76 y.o. female who came in for above.    She denies any syncope or episode of unconsciousness in the past 3 months. No history of seizure or medical problems that would prevent her from driving.  She is not on any medication that can impair her driving.      ROS:   As above    Patient Active Problem List    Diagnosis Date Noted    Schatzki's ring 08/25/2021    Allergic rhinitis 11/02/2020    Pulmonary nodules 07/08/2020    Coronary artery calcification 06/19/2020    Skin lesion 01/06/2020    Impaired glucose metabolism 10/19/2018    Microscopic hematuria 11/03/2017    History of HSV infection 05/10/2016    Cough 03/07/2016    History of compression fracture of spine 03/07/2016    Ocular migraine 01/24/2013    Osteopenia 07/06/2011    History of thyroid cancer 05/15/2009    Dyslipidemia 05/15/2009    History of depression 05/15/2009    Tubular adenoma of colon 05/15/2009    Preventative health care 05/15/2009       Current Outpatient Medications   Medication Sig Dispense Refill    pantoprazole (PROTONIX) 20 MG tablet TAKE ONE TABLET BY MOUTH DAILY 90 Tablet 1    ezetimibe (ZETIA) 10 MG Tab Take 1 Tablet by mouth every day. 90 Tablet 1    atorvastatin (LIPITOR) 80 MG tablet TAKE ONE TABLET BY MOUTH DAILY 90 Tablet 1    fluticasone (FLONASE) 50 MCG/ACT nasal spray SPRAY 2 SPRAYS INTO EACH NOSTRIL DAILY 48 mL 1    naproxen (NAPROSYN) 500 MG Tab TAKE 1 TAB BY MOUTH 2 TIMES A DAY AS NEEDED (PAIN). 60 Tablet 2    melatonin 5 mg Tab       ipratropium (ATROVENT) 0.03 % Solution PUT 1 TO 2 SPRAYS INTO EACH NOSTRIL ONE TO TWO TIMES DAILY      Calcium Polycarbophil (FIBER-CAPS PO) Take  by mouth.      BIOTIN PO Take  by mouth.      albuterol 108 (90 Base) MCG/ACT Aero Soln inhalation aerosol Inhale 1-2 Puffs every four hours as needed for Shortness of Breath (or cough). 1 Each 3    levothyroxine  "(SYNTHROID) 88 MCG Tab One po six days per week from  1 tablet 0    levothyroxine (SYNTHROID) 125 MCG Tab One po one day per week per  (Patient taking differently: Half a tab once a week) 1 tablet 0    azelastine (ASTELIN) 137 MCG/SPRAY nasal spray Administer 1 Spray into affected nostril(S) 2 times a day.      Levocetirizine Dihydrochloride (XYZAL PO) Take  by mouth.      Multiple Vitamin (MULTI-DAY PO) Take  by mouth.      Calcium Carbonate-Vitamin D (CALCIUM 600/VITAMIN D PO) Take  by mouth. Three times a week      cyanocobalamin (VITAMIN B-12) 500 MCG Tab Take 500 mcg by mouth every day.      Cholecalciferol (VITAMIN D3 PO) Take  by mouth.      Glucosamine HCl (GLUCOSAMINE PO) Take  by mouth.      Omega-3 Fatty Acids (OMEGA 3 PO) Take  by mouth.      acyclovir (ZOVIRAX) 400 MG tablet Take 400 mg by mouth 2 times a day.       No current facility-administered medications for this visit.         /60   Pulse 63   Temp 36.3 °C (97.3 °F) (Temporal)   Ht 1.626 m (5' 4\")   Wt 66.5 kg (146 lb 9.6 oz)   SpO2 97%   BMI 25.16 kg/m²     Physical Exam  General: Alert and oriented, No apparent distress.  Lungs: Clear to auscultation bilaterally without any wheezing, crepitations.  Cardiovascular: Regular rate and rhythm. No murmurs, rubs or gallops.  Extremities: No edema.         Assessment and Plan    1. Encounter for 's license history and physical    2. Encounter for completion of form with patient  - DMV renewal form filled. For vision, she has 20/50 each side and both eyes. So, she has been recommended to go see her optometrist.  Her last eye exam was in March, 2022 and she got new prescription for her bifocal at that time.           Signed by: Marilee Winslow M.D.    "

## 2022-11-01 ENCOUNTER — HOSPITAL ENCOUNTER (OUTPATIENT)
Dept: LAB | Facility: MEDICAL CENTER | Age: 77
End: 2022-11-01
Attending: PHYSICIAN ASSISTANT
Payer: MEDICARE

## 2022-11-01 PROCEDURE — 84439 ASSAY OF FREE THYROXINE: CPT

## 2022-11-01 PROCEDURE — 36415 COLL VENOUS BLD VENIPUNCTURE: CPT

## 2022-11-01 PROCEDURE — 84443 ASSAY THYROID STIM HORMONE: CPT

## 2022-11-02 LAB
T4 FREE SERPL-MCNC: 1.52 NG/DL (ref 0.93–1.7)
TSH SERPL DL<=0.005 MIU/L-ACNC: 0.91 UIU/ML (ref 0.38–5.33)

## 2022-11-07 ENCOUNTER — PATIENT MESSAGE (OUTPATIENT)
Dept: HEALTH INFORMATION MANAGEMENT | Facility: OTHER | Age: 77
End: 2022-11-07

## 2022-11-10 ENCOUNTER — OFFICE VISIT (OUTPATIENT)
Dept: SLEEP MEDICINE | Facility: MEDICAL CENTER | Age: 77
End: 2022-11-10
Payer: MEDICARE

## 2022-11-10 VITALS
BODY MASS INDEX: 24.75 KG/M2 | SYSTOLIC BLOOD PRESSURE: 130 MMHG | WEIGHT: 145 LBS | HEIGHT: 64 IN | OXYGEN SATURATION: 97 % | DIASTOLIC BLOOD PRESSURE: 56 MMHG | RESPIRATION RATE: 16 BRPM | HEART RATE: 70 BPM

## 2022-11-10 DIAGNOSIS — R91.8 PULMONARY NODULES: ICD-10-CM

## 2022-11-10 DIAGNOSIS — R05.3 CHRONIC COUGH: ICD-10-CM

## 2022-11-10 PROCEDURE — 99213 OFFICE O/P EST LOW 20 MIN: CPT | Performed by: INTERNAL MEDICINE

## 2022-11-10 RX ORDER — POTASSIUM CHLORIDE 750 MG/1
TABLET, EXTENDED RELEASE ORAL
COMMUNITY
Start: 2022-08-26 | End: 2023-02-24

## 2022-11-10 RX ORDER — FEXOFENADINE HCL 180 MG/1
TABLET ORAL
COMMUNITY

## 2022-11-10 ASSESSMENT — ENCOUNTER SYMPTOMS
CHILLS: 0
DEPRESSION: 0
FALLS: 0
EYE DISCHARGE: 0
ORTHOPNEA: 0
SPEECH CHANGE: 0
DIARRHEA: 0
FOCAL WEAKNESS: 0
BACK PAIN: 0
WHEEZING: 0
VOMITING: 0
EYE REDNESS: 0
DIAPHORESIS: 0
CONSTIPATION: 0
ABDOMINAL PAIN: 0
SPUTUM PRODUCTION: 0
WEIGHT LOSS: 0
SINUS PAIN: 0
NAUSEA: 0
FEVER: 0
SORE THROAT: 0
WEAKNESS: 0
DOUBLE VISION: 0
DIZZINESS: 0
HEADACHES: 0
PHOTOPHOBIA: 0
HEARTBURN: 0
STRIDOR: 0
COUGH: 0
TREMORS: 0
EYE PAIN: 0
BLURRED VISION: 0
PND: 0
CLAUDICATION: 0
PALPITATIONS: 0
MYALGIAS: 0
HEMOPTYSIS: 0
SHORTNESS OF BREATH: 0
NECK PAIN: 0

## 2022-11-10 ASSESSMENT — FIBROSIS 4 INDEX: FIB4 SCORE: 1.05

## 2022-11-10 NOTE — PROGRESS NOTES
"Chief Complaint   Patient presents with    Follow-Up     Last seen 7/2/21    Results     CT Chest Thorax 7/2/22          HPI: This patient is a 76 y.o. female whom is followed in our clinic for chronic cough and pulmonary nodules last seen by me on 7/2/21.  Patient is a lifelong non-smoker who was referred initially for cough present per patient at least 10 years.  Cough is dry with no clear recurring factors.  She has been seen by ENT, allergy and GI.   she was trial on ICS/LABA/LAMA combinations with no improvement and ultimately tx for allergic causes has been the most successful with OTC anithistamine plus intranasal steroid, nasal antihistamine and nasal ipratropium. She is followed by Dr. Ramesh. Pulmonary function testing from November 2018 showed normal airflows with normal lung volumes and normal DLCO.  As part of her work-up she had a CT chest which showed subcentimeter pulmonary nodules up to 6 mm in size with groundglass nature and mild biapical scarring.  She did have some minimal emphysematous changes.  Likely age-related. We have been monitoring her lung nodules since 6/2020 and most recent CT from 7/2022 showed no increase in number or size. 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    Preventative health care 5/15/2009    Shortness of breath     when walking    Unspecified urinary incontinence        Social History     Socioeconomic History    Marital status:      Spouse name: Not on file    Number of children: Not on file    Years of education: Not on file    Highest education level: Bachelor's degree (e.g., BA, AB, BS) "   Occupational History    Not on file   Tobacco Use    Smoking status: Never     Passive exposure: Past    Smokeless tobacco: Never    Tobacco comments:     Parents smoked/ smoked/worked in casinos   Vaping Use    Vaping Use: Never used   Substance and Sexual Activity    Alcohol use: Yes     Alcohol/week: 1.8 - 2.4 oz     Types: 3 - 4 Glasses of wine per week    Drug use: No    Sexual activity: Never   Other Topics Concern    Not on file   Social History Narrative    Not on file     Social Determinants of Health     Financial Resource Strain: Low Risk     Difficulty of Paying Living Expenses: Not hard at all   Food Insecurity: No Food Insecurity    Worried About Running Out of Food in the Last Year: Never true    Ran Out of Food in the Last Year: Never true   Transportation Needs: No Transportation Needs    Lack of Transportation (Medical): No    Lack of Transportation (Non-Medical): No   Physical Activity: Insufficiently Active    Days of Exercise per Week: 2 days    Minutes of Exercise per Session: 50 min   Stress: No Stress Concern Present    Feeling of Stress : Only a little   Social Connections: Moderately Integrated    Frequency of Communication with Friends and Family: Twice a week    Frequency of Social Gatherings with Friends and Family: Once a week    Attends [employer]ology Services: More than 4 times per year    Active Member of Clubs or Organizations: Yes    Attends Club or Organization Meetings: More than 4 times per year    Marital Status:    Intimate Partner Violence: Not on file   Housing Stability: Low Risk     Unable to Pay for Housing in the Last Year: No    Number of Places Lived in the Last Year: 1    Unstable Housing in the Last Year: No       Family History   Problem Relation Age of Onset    Cancer Mother     Heart Disease Father     Heart Disease Sister     Cancer Daughter        Current Outpatient Medications on File Prior to Visit   Medication Sig Dispense Refill    potassium  chloride SA (K-DUR) 10 MEQ Tab CR       fexofenadine (ALLEGRA) 180 MG tablet       pantoprazole (PROTONIX) 20 MG tablet TAKE ONE TABLET BY MOUTH DAILY 90 Tablet 1    ezetimibe (ZETIA) 10 MG Tab Take 1 Tablet by mouth every day. 90 Tablet 1    atorvastatin (LIPITOR) 80 MG tablet TAKE ONE TABLET BY MOUTH DAILY 90 Tablet 1    fluticasone (FLONASE) 50 MCG/ACT nasal spray SPRAY 2 SPRAYS INTO EACH NOSTRIL DAILY 48 mL 1    naproxen (NAPROSYN) 500 MG Tab TAKE 1 TAB BY MOUTH 2 TIMES A DAY AS NEEDED (PAIN). 60 Tablet 2    melatonin 5 mg Tab       ipratropium (ATROVENT) 0.03 % Solution PUT 1 TO 2 SPRAYS INTO EACH NOSTRIL ONE TO TWO TIMES DAILY      Calcium Polycarbophil (FIBER-CAPS PO) Take  by mouth.      BIOTIN PO Take  by mouth.      albuterol 108 (90 Base) MCG/ACT Aero Soln inhalation aerosol Inhale 1-2 Puffs every four hours as needed for Shortness of Breath (or cough). 1 Each 3    levothyroxine (SYNTHROID) 88 MCG Tab One po six days per week from  1 tablet 0    azelastine (ASTELIN) 137 MCG/SPRAY nasal spray Administer 1 Spray into affected nostril(S) 2 times a day.      Multiple Vitamin (MULTI-DAY PO) Take  by mouth.      Calcium Carbonate-Vitamin D (CALCIUM 600/VITAMIN D PO) Take  by mouth. Three times a week      cyanocobalamin (VITAMIN B-12) 500 MCG Tab Take 1 Tablet by mouth every day.      Cholecalciferol (VITAMIN D3 PO) Take  by mouth.      Glucosamine HCl (GLUCOSAMINE PO) Take  by mouth.      Omega-3 Fatty Acids (OMEGA 3 PO) Take  by mouth.      acyclovir (ZOVIRAX) 400 MG tablet Take 1 Tablet by mouth 2 times a day.      levothyroxine (SYNTHROID) 125 MCG Tab One po one day per week per  (Patient taking differently: Half a tab once a week) 1 tablet 0    Levocetirizine Dihydrochloride (XYZAL PO) Take  by mouth.       No current facility-administered medications on file prior to visit.       Kenalog and Other environmental      ROS:   Review of Systems   Constitutional:  Negative for chills,  "diaphoresis, fever, malaise/fatigue and weight loss.   HENT:  Negative for congestion, ear discharge, ear pain, hearing loss, nosebleeds, sinus pain, sore throat and tinnitus.         Rhinnorhea   Eyes:  Negative for blurred vision, double vision, photophobia, pain, discharge and redness.   Respiratory:  Negative for cough, hemoptysis, sputum production, shortness of breath, wheezing and stridor.    Cardiovascular:  Negative for chest pain, palpitations, orthopnea, claudication, leg swelling and PND.   Gastrointestinal:  Negative for abdominal pain, constipation, diarrhea, heartburn, nausea and vomiting.   Genitourinary:  Negative for dysuria and urgency.   Musculoskeletal:  Negative for back pain, falls, joint pain, myalgias and neck pain.   Skin:  Negative for itching and rash.   Neurological:  Negative for dizziness, tremors, speech change, focal weakness, weakness and headaches.   Endo/Heme/Allergies:  Negative for environmental allergies.   Psychiatric/Behavioral:  Negative for depression.      /56 (BP Location: Right arm, Patient Position: Sitting, BP Cuff Size: Adult)   Pulse 70   Resp 16   Ht 1.626 m (5' 4\")   Wt 65.8 kg (145 lb)   SpO2 97%   Physical Exam  Constitutional:       General: She is not in acute distress.     Appearance: Normal appearance. She is well-developed and normal weight.   HENT:      Head: Normocephalic and atraumatic.      Right Ear: External ear normal.      Left Ear: External ear normal.      Nose: Nose normal. No congestion.      Mouth/Throat:      Mouth: Mucous membranes are moist.      Pharynx: Oropharynx is clear. No oropharyngeal exudate.   Eyes:      General: No scleral icterus.     Extraocular Movements: Extraocular movements intact.      Conjunctiva/sclera: Conjunctivae normal.      Pupils: Pupils are equal, round, and reactive to light.   Neck:      Vascular: No JVD.      Trachea: No tracheal deviation.   Cardiovascular:      Rate and Rhythm: Normal rate and " regular rhythm.      Heart sounds: Normal heart sounds. No murmur heard.    No friction rub. No gallop.   Pulmonary:      Effort: Pulmonary effort is normal. No accessory muscle usage or respiratory distress.      Breath sounds: Normal breath sounds. No wheezing or rales.   Abdominal:      General: There is no distension.      Palpations: Abdomen is soft.      Tenderness: There is no abdominal tenderness.   Musculoskeletal:         General: No tenderness or deformity. Normal range of motion.      Cervical back: Normal range of motion and neck supple.      Right lower leg: No edema.      Left lower leg: No edema.   Lymphadenopathy:      Cervical: No cervical adenopathy.   Skin:     General: Skin is warm and dry.      Findings: No rash.      Nails: There is no clubbing.   Neurological:      Mental Status: She is alert and oriented to person, place, and time.      Cranial Nerves: No cranial nerve deficit.      Gait: Gait normal.   Psychiatric:         Behavior: Behavior normal.       PFTs as reviewed by me personally:    Imagaing as reviewed by me personally:      Assessment:  1. Chronic cough        2. Pulmonary nodules            Plan:  Presumed allergic. Controled at present with topical tx and antihistamine  Stable x2 years in low risk pt; no indication for continued surveillance  Return if symptoms worsen or fail to improve.

## 2023-01-30 DIAGNOSIS — J30.1 ALLERGIC RHINITIS DUE TO POLLEN, UNSPECIFIED SEASONALITY: ICD-10-CM

## 2023-01-30 RX ORDER — FLUTICASONE PROPIONATE 50 MCG
SPRAY, SUSPENSION (ML) NASAL
Qty: 48 ML | Refills: 0 | Status: SHIPPED | OUTPATIENT
Start: 2023-01-30 | End: 2023-05-28

## 2023-02-09 SDOH — HEALTH STABILITY: PHYSICAL HEALTH: ON AVERAGE, HOW MANY DAYS PER WEEK DO YOU ENGAGE IN MODERATE TO STRENUOUS EXERCISE (LIKE A BRISK WALK)?: 1 DAY

## 2023-02-09 SDOH — ECONOMIC STABILITY: HOUSING INSECURITY: IN THE LAST 12 MONTHS, HOW MANY PLACES HAVE YOU LIVED?: 1

## 2023-02-09 SDOH — ECONOMIC STABILITY: INCOME INSECURITY: IN THE LAST 12 MONTHS, WAS THERE A TIME WHEN YOU WERE NOT ABLE TO PAY THE MORTGAGE OR RENT ON TIME?: NO

## 2023-02-09 SDOH — HEALTH STABILITY: PHYSICAL HEALTH: ON AVERAGE, HOW MANY MINUTES DO YOU ENGAGE IN EXERCISE AT THIS LEVEL?: 30 MIN

## 2023-02-09 SDOH — ECONOMIC STABILITY: INCOME INSECURITY: HOW HARD IS IT FOR YOU TO PAY FOR THE VERY BASICS LIKE FOOD, HOUSING, MEDICAL CARE, AND HEATING?: NOT HARD AT ALL

## 2023-02-09 SDOH — ECONOMIC STABILITY: FOOD INSECURITY: WITHIN THE PAST 12 MONTHS, YOU WORRIED THAT YOUR FOOD WOULD RUN OUT BEFORE YOU GOT MONEY TO BUY MORE.: NEVER TRUE

## 2023-02-09 SDOH — ECONOMIC STABILITY: FOOD INSECURITY: WITHIN THE PAST 12 MONTHS, THE FOOD YOU BOUGHT JUST DIDN'T LAST AND YOU DIDN'T HAVE MONEY TO GET MORE.: NEVER TRUE

## 2023-02-09 ASSESSMENT — LIFESTYLE VARIABLES
SKIP TO QUESTIONS 9-10: 1
AUDIT-C TOTAL SCORE: 4
HOW MANY STANDARD DRINKS CONTAINING ALCOHOL DO YOU HAVE ON A TYPICAL DAY: 1 OR 2
HOW OFTEN DO YOU HAVE A DRINK CONTAINING ALCOHOL: 4 OR MORE TIMES A WEEK
HOW OFTEN DO YOU HAVE SIX OR MORE DRINKS ON ONE OCCASION: NEVER

## 2023-02-09 ASSESSMENT — SOCIAL DETERMINANTS OF HEALTH (SDOH)
HOW OFTEN DO YOU ATTENT MEETINGS OF THE CLUB OR ORGANIZATION YOU BELONG TO?: MORE THAN 4 TIMES PER YEAR
DO YOU BELONG TO ANY CLUBS OR ORGANIZATIONS SUCH AS CHURCH GROUPS UNIONS, FRATERNAL OR ATHLETIC GROUPS, OR SCHOOL GROUPS?: YES
HOW OFTEN DO YOU HAVE SIX OR MORE DRINKS ON ONE OCCASION: NEVER
HOW OFTEN DO YOU HAVE A DRINK CONTAINING ALCOHOL: 4 OR MORE TIMES A WEEK
HOW OFTEN DO YOU ATTENT MEETINGS OF THE CLUB OR ORGANIZATION YOU BELONG TO?: MORE THAN 4 TIMES PER YEAR
DO YOU BELONG TO ANY CLUBS OR ORGANIZATIONS SUCH AS CHURCH GROUPS UNIONS, FRATERNAL OR ATHLETIC GROUPS, OR SCHOOL GROUPS?: YES
HOW OFTEN DO YOU GET TOGETHER WITH FRIENDS OR RELATIVES?: ONCE A WEEK
HOW OFTEN DO YOU GET TOGETHER WITH FRIENDS OR RELATIVES?: ONCE A WEEK
HOW OFTEN DO YOU ATTEND CHURCH OR RELIGIOUS SERVICES?: MORE THAN 4 TIMES PER YEAR
IN A TYPICAL WEEK, HOW MANY TIMES DO YOU TALK ON THE PHONE WITH FAMILY, FRIENDS, OR NEIGHBORS?: TWICE A WEEK
HOW MANY DRINKS CONTAINING ALCOHOL DO YOU HAVE ON A TYPICAL DAY WHEN YOU ARE DRINKING: 1 OR 2
HOW HARD IS IT FOR YOU TO PAY FOR THE VERY BASICS LIKE FOOD, HOUSING, MEDICAL CARE, AND HEATING?: NOT HARD AT ALL
WITHIN THE PAST 12 MONTHS, YOU WORRIED THAT YOUR FOOD WOULD RUN OUT BEFORE YOU GOT THE MONEY TO BUY MORE: NEVER TRUE
HOW OFTEN DO YOU ATTEND CHURCH OR RELIGIOUS SERVICES?: MORE THAN 4 TIMES PER YEAR
IN A TYPICAL WEEK, HOW MANY TIMES DO YOU TALK ON THE PHONE WITH FAMILY, FRIENDS, OR NEIGHBORS?: TWICE A WEEK

## 2023-02-13 ENCOUNTER — TELEPHONE (OUTPATIENT)
Dept: MEDICAL GROUP | Facility: MEDICAL CENTER | Age: 78
End: 2023-02-13

## 2023-02-13 ENCOUNTER — OFFICE VISIT (OUTPATIENT)
Dept: MEDICAL GROUP | Facility: MEDICAL CENTER | Age: 78
End: 2023-02-13
Payer: MEDICARE

## 2023-02-13 VITALS
SYSTOLIC BLOOD PRESSURE: 134 MMHG | HEIGHT: 62 IN | DIASTOLIC BLOOD PRESSURE: 66 MMHG | HEART RATE: 73 BPM | OXYGEN SATURATION: 94 % | BODY MASS INDEX: 27.23 KG/M2 | TEMPERATURE: 97.9 F | WEIGHT: 148 LBS

## 2023-02-13 DIAGNOSIS — F32.5 DEPRESSION, MAJOR, IN REMISSION (HCC): ICD-10-CM

## 2023-02-13 DIAGNOSIS — R73.09 IMPAIRED GLUCOSE METABOLISM: ICD-10-CM

## 2023-02-13 DIAGNOSIS — Z86.59 HISTORY OF DEPRESSION: ICD-10-CM

## 2023-02-13 DIAGNOSIS — H91.90 HEARING LOSS, UNSPECIFIED HEARING LOSS TYPE, UNSPECIFIED LATERALITY: ICD-10-CM

## 2023-02-13 DIAGNOSIS — Z00.00 PREVENTATIVE HEALTH CARE: Chronic | ICD-10-CM

## 2023-02-13 DIAGNOSIS — R35.1 NOCTURIA: ICD-10-CM

## 2023-02-13 DIAGNOSIS — M81.0 POSTMENOPAUSAL BONE LOSS: ICD-10-CM

## 2023-02-13 DIAGNOSIS — I25.10 CORONARY ARTERY CALCIFICATION: ICD-10-CM

## 2023-02-13 DIAGNOSIS — E55.9 VITAMIN D DEFICIENCY: ICD-10-CM

## 2023-02-13 DIAGNOSIS — E78.5 DYSLIPIDEMIA: ICD-10-CM

## 2023-02-13 DIAGNOSIS — R79.0 LOW MAGNESIUM LEVEL: ICD-10-CM

## 2023-02-13 DIAGNOSIS — I25.84 CORONARY ARTERY CALCIFICATION: ICD-10-CM

## 2023-02-13 DIAGNOSIS — R79.89 LOW VITAMIN B12 LEVEL: ICD-10-CM

## 2023-02-13 DIAGNOSIS — Z00.00 MEDICARE ANNUAL WELLNESS VISIT, SUBSEQUENT: ICD-10-CM

## 2023-02-13 PROCEDURE — G0439 PPPS, SUBSEQ VISIT: HCPCS | Performed by: INTERNAL MEDICINE

## 2023-02-13 ASSESSMENT — ENCOUNTER SYMPTOMS: GENERAL WELL-BEING: GOOD

## 2023-02-13 ASSESSMENT — FIBROSIS 4 INDEX: FIB4 SCORE: 1.07

## 2023-02-13 ASSESSMENT — PATIENT HEALTH QUESTIONNAIRE - PHQ9: CLINICAL INTERPRETATION OF PHQ2 SCORE: 0

## 2023-02-13 ASSESSMENT — ACTIVITIES OF DAILY LIVING (ADL): BATHING_REQUIRES_ASSISTANCE: 0

## 2023-02-13 NOTE — PROGRESS NOTES
Subjective     Ping Das is a 77 y.o. female who presents with medicare wellness          HPI          Medicare wellness  Current supplements: Reviewed  Chronic narcotic pain medicines: No  Allergies:  reviewed   Sees  endocrine  Sees  pulmonary  Sees  allergy   Eye exam annually   Teeth cleaning twice per year   Driving no issues, tries to avoid night driving   Hearing no changes  Medications, allergies, medical history, surgical history, social history, family history  reviewed and updated  Screening: Reviewed  Depressive Symptoms: Denies feeling down, depressed or hopeless. Denies loss of interest or pleasure in doing things   ADLs: Denies needing help with using telephone, transportation, shopping, preparing meals, housework, laundry, or managing medication or money.    Independent with bathing, hygiene, feeding, toileting, dressing    Memory concerns: Denies difficulty remembering details of conversations, events and upcoming appointments.  Hearing problems: Denies.   Recent falls no  Takes protonix for cough   Current social contact/activities: Reviewed  No regular exercise   ROS:    Ostomy or other tubes or amputations no  Chronic oxygen use no  Has noticed some decrease in hearing left ear  Gait: normal. Uses assistive device :  no  Does have some fatigue denies depression    Patient Active Problem List   Diagnosis    History of thyroid cancer    Dyslipidemia    History of depression    Tubular adenoma of colon    Preventative health care    Osteopenia    Ocular migraine    Cough    History of compression fracture of spine    History of HSV infection    Microscopic hematuria    Impaired glucose metabolism    Skin lesion    Coronary artery calcification    Pulmonary nodules benign    Allergic rhinitis    Schatzki's ring        Patient Active Problem List   Diagnosis    History of thyroid cancer    Dyslipidemia    History of depression    Tubular adenoma of colon    Preventative  health care    Osteopenia    Ocular migraine    Cough    History of compression fracture of spine    History of HSV infection    Microscopic hematuria    Impaired glucose metabolism    Skin lesion    Coronary artery calcification    Pulmonary nodules benign    Allergic rhinitis    Schatzki's ring     Depression Screening  Little interest or pleasure in doing things?  0 - not at all  Feeling down, depressed , or hopeless? 0 - not at all  Patient Health Questionnaire Score: 0     If depressive symptoms identified deferred to follow up visit unless specifically addressed in assessment and plan.    Interpretation of PHQ-9 Total Score   Score Severity   1-4 No Depression   5-9 Mild Depression   10-14 Moderate Depression   15-19 Moderately Severe Depression   20-27 Severe Depression    Screening for Cognitive Impairment  Three Minute Recall (daughter, heaven, mountain) 3/3    Augusto clock face with all 12 numbers and set the hands to show 10 past 11.  Yes    Cognitive concerns identified deferred for follow up unless specifically addressed in assessment and plan.    Fall Risk Assessment  Has the patient had two or more falls in the last year or any fall with injury in the last year?  No    Safety Assessment  Throw rugs on floor.  Yes  Handrails on all stairs.  No  Good lighting in all hallways.  Yes  Difficulty hearing.  Yes  Patient counseled about all safety risks that were identified.    Functional Assessment ADLs  Are there any barriers preventing you from cooking for yourself or meeting nutritional needs?  No.    Are there any barriers preventing you from driving safely or obtaining transportation?  No.    Are there any barriers preventing you from using a telephone or calling for help?  No.    Are there any barriers preventing you from shopping?  No.    Are there any barriers preventing you from taking care of your own finances?  No.    Are there any barriers preventing you from managing your medications?  No.    Are  "there any barriers preventing you from showering, bathing or dressing yourself?  No.    Are you currently engaging in any exercise or physical activity?  No.     What is your perception of your health?  Good    Advance Care Planning  Do you have an Advance Directive, Living Will, Durable Power of , or POLST? Yes  Advance Directive       is on file            Patient Care Team:  Asael Redd M.D. as PCP - General  Jeimy Huntley M.D. as Consulting Physician (OB & Gyn - Gynecology)  Carolyn Ventura M.D. as Consulting Physician (Endocrinology)  Mejia Saez O.D. as Consulting Physician (Optometry)  Charu Spann M.D. as Consulting Physician (Dermatology)  Jose Alejandro Godinez M.D. as Consulting Physician (Ophthalmology)  Viraj Rodriguez M.D. as Consulting Physician (Ophthalmology)  Fred Fuentes M.D. as Consulting Physician (Ophthalmology)      Health Care Screening recommendations reviewed with patient today and updated or ordered.  DPA/Advanced directive: Completed/Information provided.   Referrals for PT/OT/Nutrition counseling/Behavioral Health/Smoking cessation as above if indicated  Discussion today about general wellness and lifestyle habits:    Prevent falls and reduce trip hazards;   Have a working fire alarm and carbon monoxide detector;   Engage in regular physical activity and social activities;   Use sun protection when outdoors. Sees dermatology       ROS           Objective     /66 (BP Location: Right arm, Patient Position: Sitting, BP Cuff Size: Adult)   Pulse 73   Temp 36.6 °C (97.9 °F)   Ht 1.575 m (5' 2\")   Wt 67.1 kg (148 lb)   SpO2 94%   BMI 27.07 kg/m²      Physical Exam  Vitals and nursing note reviewed.   Constitutional:       Appearance: Normal appearance.   HENT:      Head: Normocephalic and atraumatic.      Right Ear: Tympanic membrane and external ear normal.      Left Ear: Tympanic membrane and external ear normal.   Cardiovascular:      Rate and " Rhythm: Normal rate and regular rhythm.      Heart sounds: Normal heart sounds. No murmur heard.  Pulmonary:      Effort: No respiratory distress.      Breath sounds: Normal breath sounds.   Abdominal:      General: There is no distension.   Musculoskeletal:         General: No swelling.      Cervical back: Neck supple.   Lymphadenopathy:      Cervical: No cervical adenopathy.   Skin:     Coloration: Skin is not jaundiced.      Findings: No bruising.   Neurological:      General: No focal deficit present.      Mental Status: She is alert.   Psychiatric:         Mood and Affect: Mood normal.         Behavior: Behavior normal.                           Assessment & Plan          Assessment   #1 medicare wellness assessment    #2 dyslipidemia most recent lab 5/26/22 chol 149,trig 106,hdl 51,ldl 77 on lipitor 80 mg and zetia 10 mg, has been trying to follow good nutrition program, has not been exercising regularly    #3 allergic rhinitis followed by  on Atrovent, Flonase, Astelin, Nasalcrom    #4 hypothyroid followed by endocrinology most recent lab 11/1/22 tsh 0.9, free t4 1.52 on levothyroxine 88 mcg 6 days a week, and levothyroxine 125 mcg 1/2 tab 1 day a week per  endocrine     #5 CAD by coronary calcium score 11/12/20 CT coronary calcium score: LMA 0.0, LCx 211.5, .3, .5, .2 = 1169.4, stress echo 2021 negative    #6 cough followed by allergy and pulmonary, on PPI which she tries to take daily but usually takes this 3 to 4 days a week, chronic PPI use may contribute to B12, vitamin D, magnesium deficiency    #7 depression major of medication remission off medications stable no recurrence    #8 skin lesions followed by dermatology      #9 Vit d deficiency    #10 impaired glucose     #11 postmenopausal bone loss 6/18/20 dexa LS-0.6,hip-1.4    #12 hearing loss left ear no evidence of cerumen    #13 nocturia    Plan  #! Health maintenance reviewed and updated    #2 Old  records  endocrine    #3  Referral audiology     #4 old records skin cancer dermatology [place], use sunscreen    #5 start walking daily start 10 minutes at a time try to increase to 30 minutes daily    #6 Declines tdap today     #7 Labs    #8 has mammogram scheduled for tomorrow    #9 bone density ordered for postmenopausal bone loss    #10 repeat colonoscopy next year    #11 follow-up with allergy immunology    #12 next bone density 2024

## 2023-02-13 NOTE — LETTER
[facility]  Asael Redd M.D.  [address]  [place] [address]  Fax: [phone]   Authorization for Release/Disclosure of   Protected Health Information   Name: LEONILA MAYS : 1945 SSN: [id number]   Address: [facility] [place] [address] Phone:    [phone] (home)    I authorize the entity listed below to release/disclose the PHI below to:   [facility]/Asael Redd M.D. and Asael Redd M.D.   Provider or Entity Name:                                                        Dr Ventura ([facility])   Address   City, [facility]   Phone:      Fax:                 [phone]   Reason for request: continuity of care   Information to be released: OLD RECORDS   [  ] LAST COLONOSCOPY,  including any PATH REPORT and follow-up  [  ] LAST FIT/COLOGUARD RESULT [  ] LAST DEXA  [  ] LAST MAMMOGRAM  [  ] LAST PAP  [  ] LAST LABS [  ] RETINA EXAM REPORT  [  ] IMMUNIZATION RECORDS  [ x ] Release all info      [  ] Check here and initial the line next to each item to release ALL health information INCLUDING  _____ Care and treatment for drug and / or alcohol abuse  _____ HIV testing, infection status, or AIDS  _____ Genetic Testing    DATES OF SERVICE OR TIME PERIOD TO BE DISCLOSED: _____________  I understand and acknowledge that:  * This Authorization may be revoked at any time by you in writing, except if your health information has already been used or disclosed.  * Your health information that will be used or disclosed as a result of you signing this authorization could be re-disclosed by the recipient. If this occurs, your re-disclosed health information may no longer be protected by State or Federal laws.  * You may refuse to sign this Authorization. Your refusal will not affect your ability to obtain treatment.  * This Authorization becomes effective upon signing and will  on (date) __________.      If no date is indicated, this Authorization will  one (1) year from the  signature date.    Name: Ping Das    Signature:             Continuity of Care  Date:     2/17/2023       PLEASE FAX REQUESTED RECORDS BACK TO: [phone]

## 2023-02-13 NOTE — LETTER
[facility]  Asael Redd M.D.  [address]  [place] [address]  Fax: [phone]   Authorization for Release/Disclosure of   Protected Health Information   Name: LEONILA MAYS : 1945 SSN: [id number]   Address: [facility] [place] [address] Phone:    [phone] (home)    I authorize the entity listed below to release/disclose the PHI below to:   [facility]/Asael Redd M.D. and Asael Redd M.D.   Provider or Entity Name:                                                            [facility]   Address   City, [facility] [address] [facility], [facility]   Phone:      Fax:                [phone]   Reason for request: continuity of care   Information to be released: OLD RECORDS   [  ] LAST COLONOSCOPY,  including any PATH REPORT and follow-up  [  ] LAST FIT/COLOGUARD RESULT [  ] LAST DEXA  [  ] LAST MAMMOGRAM  [  ] LAST PAP  [  ] LAST LABS [  ] RETINA EXAM REPORT  [  ] IMMUNIZATION RECORDS  [ x ] Release all info      [  ] Check here and initial the line next to each item to release ALL health information INCLUDING  _____ Care and treatment for drug and / or alcohol abuse  _____ HIV testing, infection status, or AIDS  _____ Genetic Testing    DATES OF SERVICE OR TIME PERIOD TO BE DISCLOSED: _____________  I understand and acknowledge that:  * This Authorization may be revoked at any time by you in writing, except if your health information has already been used or disclosed.  * Your health information that will be used or disclosed as a result of you signing this authorization could be re-disclosed by the recipient. If this occurs, your re-disclosed health information may no longer be protected by State or Federal laws.  * You may refuse to sign this Authorization. Your refusal will not affect your ability to obtain treatment.  * This Authorization becomes effective upon signing and will  on (date) __________.      If no date is indicated, this Authorization will  one (1) year from the  signature date.    Name: Ping Das    Signature:                         Continuity of Care     Date:     2/17/2023       PLEASE FAX REQUESTED RECORDS BACK TO: [phone]

## 2023-02-14 ENCOUNTER — HOSPITAL ENCOUNTER (OUTPATIENT)
Dept: RADIOLOGY | Facility: MEDICAL CENTER | Age: 78
End: 2023-02-14
Attending: INTERNAL MEDICINE
Payer: MEDICARE

## 2023-02-14 DIAGNOSIS — Z12.31 VISIT FOR SCREENING MAMMOGRAM: ICD-10-CM

## 2023-02-14 PROCEDURE — 77063 BREAST TOMOSYNTHESIS BI: CPT

## 2023-02-15 ENCOUNTER — TELEPHONE (OUTPATIENT)
Dept: MEDICAL GROUP | Facility: MEDICAL CENTER | Age: 78
End: 2023-02-15
Payer: MEDICARE

## 2023-02-16 ENCOUNTER — TELEPHONE (OUTPATIENT)
Dept: MEDICAL GROUP | Facility: MEDICAL CENTER | Age: 78
End: 2023-02-16
Payer: MEDICARE

## 2023-02-16 NOTE — TELEPHONE ENCOUNTER
Please notify the patient that:  (1) her mammogram is negative but does show dense breast tissue which may decrease the accuracy of the mammogram  (2) she may obtain a screening breast ultrasound which could provide further detail  (3) [facility] no longer offers the screening breast ultrasound, thus if she is interested in having that done, she can have that at the [facility] if that imaging center is covered under her insurance plan  (3) although her insurance may not cover the screening breast ultrasound, if that is the case, then the out of pocket cost is approximately $125  (4) please let me know if she is interested in obtaining the screening breast ultrasound as we would need to send the order to [facility]

## 2023-02-16 NOTE — TELEPHONE ENCOUNTER
----- Message from Asael Redd M.D. sent at 2/15/2023  4:34 PM PST -----  Please notify the patient that:  (1) her mammogram is negative but does show dense breast tissue which may decrease the accuracy of the mammogram  (2) she may obtain a screening breast ultrasound which could provide further detail  (3) [facility] no longer offers the screening breast ultrasound, thus if she is interested in having that done, she can have that at the [facility] if that imaging center is covered under her insurance plan  (3) although her insurance may not cover the screening breast ultrasound, if that is the case, then the out of pocket cost is approximately $125  (4) please let me know if she is interested in obtaining the screening breast ultrasound as we would need to send the order to [facility]

## 2023-02-17 ENCOUNTER — HOSPITAL ENCOUNTER (OUTPATIENT)
Dept: RADIOLOGY | Facility: MEDICAL CENTER | Age: 78
End: 2023-02-17
Attending: INTERNAL MEDICINE
Payer: MEDICARE

## 2023-02-17 ENCOUNTER — TELEPHONE (OUTPATIENT)
Dept: MEDICAL GROUP | Facility: MEDICAL CENTER | Age: 78
End: 2023-02-17
Payer: MEDICARE

## 2023-02-17 DIAGNOSIS — M81.0 POSTMENOPAUSAL BONE LOSS: ICD-10-CM

## 2023-02-17 PROCEDURE — 77080 DXA BONE DENSITY AXIAL: CPT

## 2023-02-18 NOTE — TELEPHONE ENCOUNTER
Notified with bone density, decreased bone strength lumbar spine and hip, not osteoporosis range.  Does not need medications currently.  She has labs pending for vitamin D, take calcium 1000 mg total per day diet plus supplements, encourage weightbearing exercise such as walking, repeat bone density 2 years.

## 2023-02-21 ENCOUNTER — HOSPITAL ENCOUNTER (OUTPATIENT)
Dept: LAB | Facility: MEDICAL CENTER | Age: 78
End: 2023-02-21
Attending: INTERNAL MEDICINE
Payer: MEDICARE

## 2023-02-21 DIAGNOSIS — R35.1 NOCTURIA: ICD-10-CM

## 2023-02-21 DIAGNOSIS — E78.5 DYSLIPIDEMIA: ICD-10-CM

## 2023-02-21 DIAGNOSIS — R73.09 IMPAIRED GLUCOSE METABOLISM: ICD-10-CM

## 2023-02-21 DIAGNOSIS — R79.89 LOW VITAMIN B12 LEVEL: ICD-10-CM

## 2023-02-21 DIAGNOSIS — E55.9 VITAMIN D DEFICIENCY: ICD-10-CM

## 2023-02-21 DIAGNOSIS — R79.0 LOW MAGNESIUM LEVEL: ICD-10-CM

## 2023-02-21 LAB
ALBUMIN SERPL BCP-MCNC: 4.3 G/DL (ref 3.2–4.9)
ALBUMIN/GLOB SERPL: 1.9 G/DL
ALP SERPL-CCNC: 92 U/L (ref 30–99)
ALT SERPL-CCNC: 12 U/L (ref 2–50)
ANION GAP SERPL CALC-SCNC: 9 MMOL/L (ref 7–16)
APPEARANCE UR: CLEAR
AST SERPL-CCNC: 19 U/L (ref 12–45)
BACTERIA #/AREA URNS HPF: ABNORMAL /HPF
BASOPHILS # BLD AUTO: 0.6 % (ref 0–1.8)
BASOPHILS # BLD: 0.04 K/UL (ref 0–0.12)
BILIRUB SERPL-MCNC: 1 MG/DL (ref 0.1–1.5)
BILIRUB UR QL STRIP.AUTO: NEGATIVE
BUN SERPL-MCNC: 12 MG/DL (ref 8–22)
CALCIUM ALBUM COR SERPL-MCNC: 9.4 MG/DL (ref 8.5–10.5)
CALCIUM SERPL-MCNC: 9.6 MG/DL (ref 8.5–10.5)
CHLORIDE SERPL-SCNC: 105 MMOL/L (ref 96–112)
CHOLEST SERPL-MCNC: 155 MG/DL (ref 100–199)
CO2 SERPL-SCNC: 28 MMOL/L (ref 20–33)
COLOR UR: YELLOW
CREAT SERPL-MCNC: 0.51 MG/DL (ref 0.5–1.4)
EOSINOPHIL # BLD AUTO: 0.09 K/UL (ref 0–0.51)
EOSINOPHIL NFR BLD: 1.4 % (ref 0–6.9)
EPI CELLS #/AREA URNS HPF: ABNORMAL /HPF
ERYTHROCYTE [DISTWIDTH] IN BLOOD BY AUTOMATED COUNT: 43.8 FL (ref 35.9–50)
EST. AVERAGE GLUCOSE BLD GHB EST-MCNC: 126 MG/DL
GFR SERPLBLD CREATININE-BSD FMLA CKD-EPI: 96 ML/MIN/1.73 M 2
GLOBULIN SER CALC-MCNC: 2.3 G/DL (ref 1.9–3.5)
GLUCOSE SERPL-MCNC: 94 MG/DL (ref 65–99)
GLUCOSE UR STRIP.AUTO-MCNC: NEGATIVE MG/DL
HBA1C MFR BLD: 6 % (ref 4–5.6)
HCT VFR BLD AUTO: 40.6 % (ref 37–47)
HDLC SERPL-MCNC: 50 MG/DL
HGB BLD-MCNC: 13.8 G/DL (ref 12–16)
HYALINE CASTS #/AREA URNS LPF: ABNORMAL /LPF
IMM GRANULOCYTES # BLD AUTO: 0.02 K/UL (ref 0–0.11)
IMM GRANULOCYTES NFR BLD AUTO: 0.3 % (ref 0–0.9)
KETONES UR STRIP.AUTO-MCNC: NEGATIVE MG/DL
LDLC SERPL CALC-MCNC: 81 MG/DL
LEUKOCYTE ESTERASE UR QL STRIP.AUTO: ABNORMAL
LYMPHOCYTES # BLD AUTO: 1.58 K/UL (ref 1–4.8)
LYMPHOCYTES NFR BLD: 25.1 % (ref 22–41)
MAGNESIUM SERPL-MCNC: 2 MG/DL (ref 1.5–2.5)
MCH RBC QN AUTO: 31.3 PG (ref 27–33)
MCHC RBC AUTO-ENTMCNC: 34 G/DL (ref 33.6–35)
MCV RBC AUTO: 92.1 FL (ref 81.4–97.8)
MICRO URNS: ABNORMAL
MONOCYTES # BLD AUTO: 0.58 K/UL (ref 0–0.85)
MONOCYTES NFR BLD AUTO: 9.2 % (ref 0–13.4)
NEUTROPHILS # BLD AUTO: 3.98 K/UL (ref 2–7.15)
NEUTROPHILS NFR BLD: 63.4 % (ref 44–72)
NITRITE UR QL STRIP.AUTO: NEGATIVE
NRBC # BLD AUTO: 0 K/UL
NRBC BLD-RTO: 0 /100 WBC
PH UR STRIP.AUTO: 6.5 [PH] (ref 5–8)
PLATELET # BLD AUTO: 272 K/UL (ref 164–446)
PMV BLD AUTO: 10.1 FL (ref 9–12.9)
POTASSIUM SERPL-SCNC: 4.4 MMOL/L (ref 3.6–5.5)
PROT SERPL-MCNC: 6.6 G/DL (ref 6–8.2)
PROT UR QL STRIP: NEGATIVE MG/DL
RBC # BLD AUTO: 4.41 M/UL (ref 4.2–5.4)
RBC # URNS HPF: ABNORMAL /HPF
RBC UR QL AUTO: NEGATIVE
SODIUM SERPL-SCNC: 142 MMOL/L (ref 135–145)
SP GR UR STRIP.AUTO: 1.01
TRANS CELLS #/AREA URNS HPF: ABNORMAL /HPF
TRIGL SERPL-MCNC: 120 MG/DL (ref 0–149)
UROBILINOGEN UR STRIP.AUTO-MCNC: 0.2 MG/DL
WBC # BLD AUTO: 6.3 K/UL (ref 4.8–10.8)
WBC #/AREA URNS HPF: ABNORMAL /HPF

## 2023-02-21 PROCEDURE — 80061 LIPID PANEL: CPT

## 2023-02-21 PROCEDURE — 81001 URINALYSIS AUTO W/SCOPE: CPT

## 2023-02-21 PROCEDURE — 85025 COMPLETE CBC W/AUTO DIFF WBC: CPT

## 2023-02-21 PROCEDURE — 82607 VITAMIN B-12: CPT

## 2023-02-21 PROCEDURE — 83735 ASSAY OF MAGNESIUM: CPT

## 2023-02-21 PROCEDURE — 82306 VITAMIN D 25 HYDROXY: CPT

## 2023-02-21 PROCEDURE — 36415 COLL VENOUS BLD VENIPUNCTURE: CPT

## 2023-02-21 PROCEDURE — 80053 COMPREHEN METABOLIC PANEL: CPT

## 2023-02-21 PROCEDURE — 83036 HEMOGLOBIN GLYCOSYLATED A1C: CPT | Mod: GA

## 2023-02-22 ENCOUNTER — TELEPHONE (OUTPATIENT)
Dept: MEDICAL GROUP | Facility: MEDICAL CENTER | Age: 78
End: 2023-02-22
Payer: MEDICARE

## 2023-02-23 LAB
25(OH)D3 SERPL-MCNC: 58 NG/ML (ref 30–100)
VIT B12 SERPL-MCNC: 1326 PG/ML (ref 211–911)

## 2023-02-23 NOTE — TELEPHONE ENCOUNTER
Notified with labs, await B12 and vitamin D.  A1c improved to 6.0%, continue with a good nutrition and activity program, urinalysis no evidence of diabetes, few WBCs but no evidence of infection per lab criteria, cholesterol stable on atorvastatin and ezetimibe.  Normal renal and hepatic function.  No changes to her medication regimen.

## 2023-03-07 ENCOUNTER — TELEPHONE (OUTPATIENT)
Dept: MEDICAL GROUP | Facility: MEDICAL CENTER | Age: 78
End: 2023-03-07
Payer: MEDICARE

## 2023-03-08 ENCOUNTER — TELEPHONE (OUTPATIENT)
Dept: MEDICAL GROUP | Facility: MEDICAL CENTER | Age: 78
End: 2023-03-08
Payer: MEDICARE

## 2023-03-08 NOTE — TELEPHONE ENCOUNTER
----- Message from Asael Redd M.D. sent at 3/7/2023 10:43 PM PST -----  Please notify the patient that her screening breast ultrasound done at [facility] is negative

## 2023-03-08 NOTE — TELEPHONE ENCOUNTER
Please notify the patient that her screening breast ultrasound done at [facility] is negative

## 2023-03-10 ENCOUNTER — APPOINTMENT (RX ONLY)
Dept: URBAN - METROPOLITAN AREA CLINIC 4 | Facility: CLINIC | Age: 78
Setting detail: DERMATOLOGY
End: 2023-03-10

## 2023-03-10 DIAGNOSIS — D22 MELANOCYTIC NEVI: ICD-10-CM

## 2023-03-10 DIAGNOSIS — Z85.828 PERSONAL HISTORY OF OTHER MALIGNANT NEOPLASM OF SKIN: ICD-10-CM

## 2023-03-10 DIAGNOSIS — L82.1 OTHER SEBORRHEIC KERATOSIS: ICD-10-CM

## 2023-03-10 DIAGNOSIS — L90.5 SCAR CONDITIONS AND FIBROSIS OF SKIN: ICD-10-CM

## 2023-03-10 DIAGNOSIS — L72.8 OTHER FOLLICULAR CYSTS OF THE SKIN AND SUBCUTANEOUS TISSUE: ICD-10-CM

## 2023-03-10 DIAGNOSIS — L81.4 OTHER MELANIN HYPERPIGMENTATION: ICD-10-CM | Status: STABLE

## 2023-03-10 DIAGNOSIS — L57.0 ACTINIC KERATOSIS: ICD-10-CM

## 2023-03-10 PROBLEM — D22.5 MELANOCYTIC NEVI OF TRUNK: Status: ACTIVE | Noted: 2023-03-10

## 2023-03-10 PROCEDURE — ? DIAGNOSIS COMMENT

## 2023-03-10 PROCEDURE — 99213 OFFICE O/P EST LOW 20 MIN: CPT | Mod: 25

## 2023-03-10 PROCEDURE — ? PHOTO-DOCUMENTATION

## 2023-03-10 PROCEDURE — 17003 DESTRUCT PREMALG LES 2-14: CPT

## 2023-03-10 PROCEDURE — ? OBSERVATION AND MEASURE

## 2023-03-10 PROCEDURE — ? COUNSELING

## 2023-03-10 PROCEDURE — 17000 DESTRUCT PREMALG LESION: CPT

## 2023-03-10 PROCEDURE — ? LIQUID NITROGEN

## 2023-03-10 ASSESSMENT — LOCATION DETAILED DESCRIPTION DERM
LOCATION DETAILED: RIGHT MEDIAL FRONTAL SCALP
LOCATION DETAILED: RIGHT MEDIAL POSTERIOR ANKLE
LOCATION DETAILED: LEFT BUTTOCK
LOCATION DETAILED: SUPERIOR LUMBAR SPINE
LOCATION DETAILED: LEFT DISTAL PRETIBIAL REGION
LOCATION DETAILED: LEFT CENTRAL ZYGOMA
LOCATION DETAILED: RIGHT DISTAL PRETIBIAL REGION
LOCATION DETAILED: LEFT SUPERIOR UPPER BACK
LOCATION DETAILED: MID TRAPEZIAL NECK
LOCATION DETAILED: RIGHT ANTERIOR LATERAL DISTAL THIGH
LOCATION DETAILED: RIGHT BUTTOCK

## 2023-03-10 ASSESSMENT — LOCATION ZONE DERM
LOCATION ZONE: LEG
LOCATION ZONE: SCALP
LOCATION ZONE: TRUNK
LOCATION ZONE: NECK
LOCATION ZONE: FACE

## 2023-03-10 ASSESSMENT — LOCATION SIMPLE DESCRIPTION DERM
LOCATION SIMPLE: TRAPEZIAL NECK
LOCATION SIMPLE: RIGHT THIGH
LOCATION SIMPLE: RIGHT BUTTOCK
LOCATION SIMPLE: RIGHT SCALP
LOCATION SIMPLE: LEFT UPPER BACK
LOCATION SIMPLE: RIGHT PRETIBIAL REGION
LOCATION SIMPLE: LEFT PRETIBIAL REGION
LOCATION SIMPLE: LOWER BACK
LOCATION SIMPLE: LEFT ZYGOMA
LOCATION SIMPLE: RIGHT ANKLE
LOCATION SIMPLE: LEFT BUTTOCK

## 2023-03-10 NOTE — PROCEDURE: DIAGNOSIS COMMENT
Comment: [id number]
Detail Level: Detailed
Render Risk Assessment In Note?: no
Comment: See photo from 09/12/2018
Comment: History of skin cancers
Comment: [id number], foreign body granuloma c/w a reaction to a ruptured follicular cyst

## 2023-03-15 ENCOUNTER — HOSPITAL ENCOUNTER (OUTPATIENT)
Dept: RADIOLOGY | Facility: MEDICAL CENTER | Age: 78
End: 2023-03-15
Payer: MEDICARE

## 2023-03-30 ENCOUNTER — HOSPITAL ENCOUNTER (OUTPATIENT)
Dept: LAB | Facility: MEDICAL CENTER | Age: 78
End: 2023-03-30
Attending: PHYSICIAN ASSISTANT
Payer: MEDICARE

## 2023-03-30 PROCEDURE — 84443 ASSAY THYROID STIM HORMONE: CPT

## 2023-03-30 PROCEDURE — 84439 ASSAY OF FREE THYROXINE: CPT

## 2023-03-30 PROCEDURE — 36415 COLL VENOUS BLD VENIPUNCTURE: CPT

## 2023-03-31 LAB
T4 FREE SERPL-MCNC: 1.48 NG/DL (ref 0.93–1.7)
TSH SERPL DL<=0.005 MIU/L-ACNC: 0.64 UIU/ML (ref 0.38–5.33)

## 2023-04-06 PROBLEM — H90.5 SENSORINEURAL HEARING LOSS: Status: ACTIVE | Noted: 2023-04-06

## 2023-04-14 RX ORDER — LEVOTHYROXINE SODIUM 0.07 MG/1
75 TABLET ORAL
Qty: 1 TABLET | Refills: 0
Start: 2023-04-14

## 2023-04-25 NOTE — PROGRESS NOTES
Chief Complaint   Patient presents with   • Annual Wellness Visit         HPI:  Ping is a 72 y.o. here for Medicare Annual Wellness Visit  Sees  endocrinology   Medication, allergies, medical history, surgical history, social history, family history reviewed and updated  Dry cough for a number of years, has tried allergy medications no benefit, tried over-the-counter Prilosec no benefit, no asthma, no COPD, no fevers, chills, night sweats    Patient Active Problem List    Diagnosis Date Noted   • Microscopic hematuria 11/03/2017   • HSV infection 05/10/2016   • History of compression fracture of spine 03/07/2016   • Ocular migraine 01/24/2013   • Osteopenia 07/06/2011   • History of thyroid cancer 05/15/2009   • Dyslipidemia 05/15/2009   • History of depression 05/15/2009   • Tubular adenoma of colon 05/15/2009   • Preventative health care 05/15/2009       Current Outpatient Prescriptions   Medication Sig Dispense Refill   • atorvastatin (LIPITOR) 20 MG Tab Take 1 Tab by mouth every day. 90 Tab 2   • potassium chloride ER (KLOR-CON) 10 MEQ tablet Take 1 Tab by mouth every day. 90 Tab 3   • fluticasone (FLONASE) 50 MCG/ACT nasal spray Spray 2 Sprays in nose every day. 48 g 4   • levothyroxine (SYNTHROID) 100 MCG TABS Take 100 mcg by mouth every day. Takes 1 1/2 tabs per day      • acyclovir (ZOVIRAX) 400 MG tablet Take 400 mg by mouth 2 times a day.     • naproxen (NAPROSYN) 500 MG Tab Take 1 Tab by mouth 2 times a day as needed. 60 Tab 5     No current facility-administered medications for this visit.             Tubular Adenoma  2004 no records path unknown  4/30/12 [facility] colon tubular adenoma, repeat 5 yrs  10/20/17 colon per [facility] 5 polyps pathology 4 polyps adenomatous and benign polypoid tissue, severe diverticulosis, recommend repeat colonoscopy 3 years     Thoracic compression fracture  11/10/04 thoracic x-ray negative  3/4/14 cxr mid apex right lateral curvature thoracic spine may be positional or  from mild scoliosis  3/7/16 cxr mid and upper thoracic spine compression fracture     Preventative health  7/11 pap per gyn   1/19/12 pneumovax  1/19/12 tdap  3/5/14 zoster vaccine  2/5/15 prevnar  6/12/15 dexa LS -0.6,hip -1.3;FRAX 10.5% major,hip 1.7%  6/23/16 pap per gyn   9/5/17 vit d 42  9/27/17 sonocine negative  10/20/17 colon per [facility] 5 polyps pathology 4 polyps adenomatous and benign polypoid tissue, severe diverticulosis, recommend repeat colonoscopy 3 years  9/6/17 mammogram heterogeneously dense breast tissue offered sonocine     Osteopenia  10/08 dexa LS -1.0, hip -0.4  7/11 dexa LS -0.5,hip -1.2  2/13 vit d 50  3/15/14 vit d 31  3/25/15 vit d 33  6/12/15 dexa LS -0.6,hip -1.3;FRAX 10.5% major,hip 1.7%  3/7/16 cxr mid and upper thoracic spine compression fracture  5/17/16 vit d 29 take extra 2000 units daily  9/5/17 dexa LS-0.7,hip-1.2  9/5/17 vit d 42     Ocular migraine    Microscopic hematuria  9/5/17 UA 2-5 RBC  9/7/17 urine culture negative, repeat UA 2 weeks, order in system  9/27/17 UA 2-5 RBC, will order us renal  10/4/17 ultrasound renal negative repeat urinalysis 2 weeks, if still positive will check CT urogram and urology evaluation  10/31/17 UA no RBC urine culture negative     HSV acyclovir as needed     History thyroid cancer  8/07 s/p thyroidectomy 1.1 cm no evid of adenopathy sees   3/11 dr.abbott saunders note u/s thyroid in june, on levoxyl 100 mg  6/11 tsh 0.33, on levoxyl 100 mcg  1/12 tsh 3.9 on levoxyl 100 mcg followed by   8/12 dr.abbott saunders note, tsh 0.05,free t4 1.46, thyroglobulin <0.9, decrease levothyroxine to 100 mcg. Repeat thyroid ultrasound February 1/13 tsh 0.1, thyroglobulin less than 0.1; dr.abbott saunders note no changes  1/29/14 tsh 0.17,free t4 1.0,thyroglobulin <0.1 on levothyroxine 100 mcg  2/25/14 tsh 0.12, free t4, free t4 1.2,thyroglobulin 0.1 on levothyroxine 100 mcg  9/5/14 tsh 0.13,free t4 1.1 on levothyroxine 100 mcg one pill  six days per week ,1/2 pill on day 7  9/20/14  endocrine note  1/6/15 tsh 1.5,free t4 0.99 on levothyroxine 100 mcg 6 days per week and 75 mcg one day per week  1/13/15  endocrine note  3/24/15 tsh 0.3,free t4 1.5 on levothyroxine 100 mcg 6 days per week  4/2/15  endocrine note continue same dose levothyroxine and follow up 6 months  9/30/15 tsh 0.09,free t4 1.3, thyroglobulin<0.1, antithyroglobulin antibody less<0.9; on synthroid 100 mcg 6 days a week, synthroid 75 mcg one day per week  10/6/15  endocrinology note decrease total thyroid dose by 25 mcg during the week  5/17/16 tsh 0.6,free t4 0.9 on synthroid 100 mcg 6 days per week and 50 mcg 1 day per week? Per  endocrinology  5/15/17 tsh 1.1,free t4 1.1 on levothyroxine 100 mcg six days per week  5/18/17  endocrine note change to levothyroxine 75 mcg daily   11/27/17 tsh 0.15,free t4 1.26, thyroglbulin< 0.1, antithyroglobulin < 0.9  followed by  endocrinology  1/19/18 tsh 0.9 on levothyroxine per  endocrinology  4/17/18 tsh 0.7, free t4 1.2 on levothyroxine per  endocrinology     History depression  Since 2001, having failed celexa and effexor?, lexapro working well  4/10 samples cymbalta  6/11 off cymbalta  7/24/14 trial wellbutrin 150 mg, PHQ-9 17  8/19/14 behavioral health note; pt will follow up with psychotherapy  9/22/14 increase wellbutrin to 300 mgdepression  10/21/14 PHQ 14  10/21/14 decrease wellbutrin to 150 mg and add effexor XR 75 mg qday  1/25/15 off wellbutrin     Dyslipidemia  2/09 chol 204,trig 130, hdl 62, ldl 116  5/10 chol 142,trig 63,hdl 64,ldl 65  6/11 chol 120,trig 65,hdl 68,ldl 39 on mevacor and niaspan  1/12 chol 98,trig 55,hdl 40,ldl 47 on mevacor and niaspan; ok to stop niaspan  2/13 chol 172,trig 314,hdl 45,ldl 64 on mevacor 40 mg; start fish oil 2 bid   3/15/14 chol 198,trig 101,hdl 54,ldl 124 on mevacor 40 mg and fish oil  7/29/14 dietary evaluation and  education  3/25/15 chol 175,trig 107,hdl 54,ldl 100 on mevacor 40 mg  5/17/16 chol 196,trig 105,hdl 52,ldl 123 on mevacor 40 mg  9/5/17 chol 206,trig 139,hdl 48,ldl 130 on mevacor 40 mg  9/8/17 change to lipitor 20 mg and repeat labs in 4 weeks  11/1/17 chol 179,trig 168,hdl 44,ldl 101 on lipitor 20 mg          Patient is taking medications as noted in medication list.  Current supplements as per medication list.     Allergies: Kenalog    Current social contact/activities: Pt likes to travel. Pt goes for walk and lunches with friends. Pt spend time with family.      Is patient current with immunizations? No, due for SHINGRIX (Shingles). Patient is interested in receiving NONE today.    She  reports that she has never smoked. She has never used smokeless tobacco. She reports that she drinks about 0.6 oz of alcohol per week . She reports that she does not use drugs.  Counseling given: Not Answered        DPA/Advanced directive: Patient has Advanced Directive, but it is not on file. Instructed to bring in a copy to scan into their chart.    ROS:    Gait: Uses no assistive device    Ostomy: No    Other tubes: No   Amputations: No    Chronic oxygen use No    Last eye exam September 2018    Wears hearing aids: No    : Reports urinary leakage during the last 6 months that has somewhat interfered with their daily activities or sleep.        Screening:    .  DEPRESSION     Is patient seeing a counselor, psychiatrist or other healthcare provider regarding their mental health? No, and not interested.     Depression Screen (PHQ-2/PHQ-9) 5/10/2016 7/18/2017 10/11/2018   PHQ-2 Total Score 0 - -   PHQ-2 Total Score 0 0 0   PHQ-9 Total Score - - -   PHQ-9 Total Score - 3 -       Interpretation of PHQ-9 Total Score   Score Severity   1-4 No Depression   5-9 Mild Depression   10-14 Moderate Depression   15-19 Moderately Severe Depression   20-27 Severe Depression      Depression Screening    Little interest or pleasure in doing  things?  0 - not at all  Feeling down, depressed, or hopeless? 0 - not at all  Patient Health Questionnaire Score: 0    If depressive symptoms identified deferred to follow up visit unless specifically addressed in assessment and plan.    Interpretation of PHQ-9 Total Score   Score Severity   1-4 No Depression   5-9 Mild Depression   10-14 Moderate Depression   15-19 Moderately Severe Depression   20-27 Severe Depression    Screening for Cognitive Impairment    Three Minute Recall (leader, season, table)  3/3 Debra, heaven, daughter  Augusto clock face with all 12 numbers and set the hands to show 10 past 11.  Yes 5/5  If cognitive concerns identified, deferred for follow up unless specifically addressed in assessment and plan.    Fall Risk Assessment    Has the patient had two or more falls in the last year or any fall with injury in the last year?  No  If fall risk identified, deferred for follow up unless specifically addressed in assessment and plan.    Safety Assessment    Throw rugs on floor.  Yes  Handrails on all stairs.  No  Good lighting in all hallways.  Yes  Difficulty hearing.  No  Patient counseled about all safety risks that were identified.    Functional Assessment ADLs    Are there any barriers preventing you from cooking for yourself or meeting nutritional needs?  No.    Are there any barriers preventing you from driving safely or obtaining transportation?  No.    Are there any barriers preventing you from using a telephone or calling for help?  No.    Are there any barriers preventing you from shopping?  No.    Are there any barriers preventing you from taking care of your own finances?  No.    Are there any barriers preventing you from managing your medications?  No.    Are there any barriers preventing you from showering, bathing or dressing yourself?  Yes.    Are you currently engaging in any exercise or physical activity?  Yes.  Pt walks 3 x weekly 1-3 miles  What is your perception of your  health?  Good.    Health Maintenance Summary                IMM ZOSTER VACCINES Overdue 4/29/2014      Done 3/4/2014 Imm Admin: Zoster Vaccine Live (ZVL) (Zostavax)    Annual Wellness Visit Overdue 7/19/2018      Done 7/18/2017 Visit Dx: Medicare annual wellness visit, subsequent     Patient has more history with this topic...    MAMMOGRAM Next Due 9/11/2019      Done 9/11/2018 MA-SCREENING MAMMO BILAT W/TOMOSYNTHESIS W/CAD     Patient has more history with this topic...    IMM DTaP/Tdap/Td Vaccine Next Due 1/19/2022      Done 1/19/2012 Imm Admin: Tdap Vaccine    BONE DENSITY Next Due 9/5/2022      Done 9/5/2017 DS-BONE DENSITY STUDY (DEXA)     Patient has more history with this topic...    COLONOSCOPY Next Due 10/20/2022      Done 10/20/2017 REFERRAL TO GI FOR COLONOSCOPY          Patient Care Team:  Asael Redd M.D. as PCP - General  Jeimy Huntley M.D. as Consulting Physician (Gynecology)  Carolyn Ventura M.D. as Consulting Physician (Endocrinology)  Mejia Saez O.D. as Consulting Physician (Optometry)    Social History   Substance Use Topics   • Smoking status: Never Smoker   • Smokeless tobacco: Never Used   • Alcohol use 0.6 oz/week     1 Glasses of wine per week      Comment: occasional     Family History   Problem Relation Age of Onset   • Cancer Mother    • Heart Disease Father    • Heart Disease Sister    • Cancer Daughter      She  has a past medical history of Anesthesia; Cancer (HCC); CATARACT; Colon polyp (5/15/2009); Depression (5/15/2009); Dyslipidemia (5/15/2009); Hypothyroid (5/15/2009); Hypovitaminosis D (5/15/2009); Palpitations (5/15/2009); 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   Past Surgical History:   Procedure Laterality Date   • VITRECTOMY POSTERIOR  11/17/2011    Performed by TRACEY RAMON at SURGERY SAME DAY [facility] ORS   • OTHER  August 2007    thyroid   • OTHER  Nov 2006    cataract  "  • OTHER  July 2006    Skin /face           Exam:     Blood pressure 108/62, pulse (!) 56, temperature 36.4 °C (97.6 °F), temperature source Temporal, height 1.626 m (5' 4.02\"), weight 71.9 kg (158 lb 8 oz), SpO2 96 %. Body mass index is 27.19 kg/m².    Hearing and dentition fair  Alert, oriented in no acute distress.  Eye contact is good, speech goal directed, affect calm  Lungs clear  Cardiovascular S1-S2 regular    Assessment and Plan. The following treatment and monitoring plan is recommended:   Assessment  #! Wellness assessment     #2 osteopenia 9/5/17 dexa LS-0.7,hip-1.2 9/5/17 vit d 42 exercising on a regular basis     #3 history thyroid cancer followed by endocrinology on medication and lab testing per endocrinology 4/17/18 tsh 0.7, free t4 1.2 on levothyroxine per  endocrinology     #4 dyslipidemia most recent lipid panel 11/1/17 chol 179,trig 168,hdl 44,ldl 101 on lipitor 20 mg no muscle pain or muscle aches related to statin therapy    #5 tubular Adenoma followed by GI 10/20/17 colon per [facility] 5 polyps pathology 4 polyps adenomatous and benign polypoid tissue, severe diverticulosis, recommend repeat colonoscopy 3 years     #6 preventative health  7/11 pap per gyn   1/19/12 pneumovax  1/19/12 tdap  3/5/14 zoster vaccine  2/5/15 prevnar  6/12/15 dexa LS -0.6,hip -1.3;FRAX 10.5% major,hip 1.7%  6/23/16 pap per gyn   9/5/17 vit d 42  9/27/17 sonocine negative  10/20/17 colon per [facility] 5 polyps pathology 4 polyps adenomatous and benign polypoid tissue, severe diverticulosis, recommend repeat colonoscopy 3 years  9/6/17 mammogram heterogeneously dense breast tissue offered sonocine    #7 dry cough, has had xray previously and off flonase and tried otc med prilosec no change no asthma, no COPD, no smoking    #8 low vitamin D    #9 impaired fasting blood sugar      Services suggested:    Health Care Screening recommendations as per orders if indicated.  Referrals offered: " PT/OT/Nutrition counseling/Behavioral Health/Smoking cessation as per orders if indicated.    Discussion today about general wellness and lifestyle habits:    · Prevent falls and reduce trip hazards; Cautioned about securing or removing rugs.  · Have a working fire alarm and carbon monoxide detector;   · Engage in regular physical activity and social activities       Follow-up:   Plan   #! shingrix prescription provided to get at pharmacy    #2 mammogram had already     #3 sonocine pending    #4 dexa     #5 cxr for dry cough consider related to allergies, has tried reflux prescription without benefit\\    #6 health maintenance reviewed and updated    #7 has had pneumococcal 13, pneumococcal 23, tdap    #8 screening breast ultrasound pending, has had mammogram, followed by gynecology    #9 repeat colonoscopy 2 years    #10 follow-up endocrinology    #11 follow-up 6 months         Low Dose Naltrexone Pregnancy And Lactation Text: Naltrexone is pregnancy category C.  There have been no adequate and well-controlled studies in pregnant women.  It should be used in pregnancy only if the potential benefit justifies the potential risk to the fetus.   Limited data indicates that naltrexone is minimally excreted into breastmilk.

## 2023-06-11 NOTE — PROCEDURE: COUNSELING
Patient not appropriate for cardiac rehabilitation service at this time due to mobility status (patient up with steady). Will follow for activity and education when appropriate.   
Detail Level: Detailed
Patient Specific Counseling (Will Not Stick From Patient To Patient): *\\n\\nNo changes noted when compared to the previously taken photo.  Patient will monitor site closely and follow up in 6 months, sooner if needed.
Detail Level: Simple
Detail Level: Generalized
Detail Level: Zone

## 2023-07-10 ENCOUNTER — HOSPITAL ENCOUNTER (OUTPATIENT)
Dept: LAB | Facility: MEDICAL CENTER | Age: 78
End: 2023-07-10
Attending: INTERNAL MEDICINE
Payer: MEDICARE

## 2023-07-10 LAB
T4 FREE SERPL-MCNC: 1.4 NG/DL (ref 0.93–1.7)
TSH SERPL DL<=0.005 MIU/L-ACNC: 1.74 UIU/ML (ref 0.38–5.33)

## 2023-07-10 PROCEDURE — 36415 COLL VENOUS BLD VENIPUNCTURE: CPT

## 2023-07-10 PROCEDURE — 84443 ASSAY THYROID STIM HORMONE: CPT

## 2023-07-10 PROCEDURE — 84439 ASSAY OF FREE THYROXINE: CPT

## 2023-08-11 ENCOUNTER — TELEPHONE (OUTPATIENT)
Dept: MEDICAL GROUP | Facility: MEDICAL CENTER | Age: 78
End: 2023-08-11

## 2023-08-11 DIAGNOSIS — Z86.16 HISTORY OF COVID-19: ICD-10-CM

## 2023-08-17 ENCOUNTER — TELEMEDICINE (OUTPATIENT)
Dept: MEDICAL GROUP | Facility: MEDICAL CENTER | Age: 78
End: 2023-08-17
Payer: MEDICARE

## 2023-08-17 ENCOUNTER — HOSPITAL ENCOUNTER (OUTPATIENT)
Dept: RADIOLOGY | Facility: MEDICAL CENTER | Age: 78
End: 2023-08-17
Attending: INTERNAL MEDICINE
Payer: MEDICARE

## 2023-08-17 VITALS — BODY MASS INDEX: 24.8 KG/M2 | WEIGHT: 140 LBS | TEMPERATURE: 98.6 F | HEIGHT: 63 IN

## 2023-08-17 DIAGNOSIS — M54.9 BACK PAIN, UNSPECIFIED BACK LOCATION, UNSPECIFIED BACK PAIN LATERALITY, UNSPECIFIED CHRONICITY: ICD-10-CM

## 2023-08-17 DIAGNOSIS — Z86.16 HISTORY OF COVID-19: ICD-10-CM

## 2023-08-17 DIAGNOSIS — R05.9 COUGH, UNSPECIFIED TYPE: ICD-10-CM

## 2023-08-17 DIAGNOSIS — E78.5 DYSLIPIDEMIA: ICD-10-CM

## 2023-08-17 PROCEDURE — 99214 OFFICE O/P EST MOD 30 MIN: CPT | Mod: 95 | Performed by: INTERNAL MEDICINE

## 2023-08-17 PROCEDURE — 71046 X-RAY EXAM CHEST 2 VIEWS: CPT

## 2023-08-17 RX ORDER — CYCLOBENZAPRINE HCL 5 MG
5 TABLET ORAL 2 TIMES DAILY PRN
Qty: 30 TABLET | Refills: 0 | Status: SHIPPED | OUTPATIENT
Start: 2023-08-17 | End: 2024-02-15

## 2023-08-17 RX ORDER — LEVOTHYROXINE SODIUM 0.07 MG/1
TABLET ORAL
COMMUNITY
End: 2023-08-17

## 2023-08-17 RX ORDER — GLUCOSAMINE SULFATE 500 MG
CAPSULE ORAL
COMMUNITY
End: 2023-08-17

## 2023-08-17 RX ORDER — ATORVASTATIN CALCIUM 80 MG/1
TABLET, FILM COATED ORAL
COMMUNITY
End: 2023-08-17

## 2023-08-17 ASSESSMENT — FIBROSIS 4 INDEX: FIB4 SCORE: 1.55

## 2023-08-17 NOTE — PROGRESS NOTES
Subjective     Ping Das is a 77 y.o. female who presents with Back Pain          Virtual Visit: Established Patient   This visit was conducted via Zoom using secure and encrypted videoconferencing technology.   The patient was in their home in the [facility].    The patient's identity was confirmed and verbal consent was obtained for this virtual visit.     Subjective:   CC: back pain   Chief Complaint   Patient presents with    Back Pain       Ping Das is a 77 y.o. female presenting for evaluation and management of:back pain   Telemedicine virtual appointment.  Adequate audio and visual for the appointment.  New complaint of back pain.  Patient has had back pain since returning from her trip to [place], returning to town about 10 days ago, she did do walking on her trip to [place], but not any other physical activity.  Although she did have 50 pound luggage bags that she had to leit during her travels.  After she returned from her trip, her back pain started, she had more mid left back but no radiation to the neck or low back, pain was worse with deep inspiration, more like a pressure sensation, worse with any type of movement, bending, lifting, twisting, the back pain was better with rest. Tried naproxen and ibuprofen and advil, tried heating pad which was beneficial.  No rash.  She also tested positive for COVID August 6, I did send a prescription to her pharmacy for Paxlovid she was not able to start that medication.  Initially she had head congestion, chest congestion, slight cough with sputum, no hemoptysis, no fevers, no shortness of breath, she did have the back pain with deep inspiration or cough.  More tiredness, fatigue, no nausea or vomiting, no rash.  No change in taste or smell.  Previous history of thoracic T6 mild compression fracture by chest x-ray 2016, osteopenia by recent bone density test lumbar spine -1.5.  No history of direct trauma to the back, no history of recent  falls.  Dyslipidemia on Lipitor  Medications, allergies, medical history, surgical history, social history, family history  reviewed and updated    Current medicines (including changes today)  Current Outpatient Medications   Medication Sig Dispense Refill    atorvastatin (LIPITOR) 80 MG tablet 1 tablet Orally every day      levothyroxine (SYNTHROID) 75 MCG Tab 1 tab(s) Orally every day for 90 days      glucosamine 500 MG Cap 1 capsule with a meal Orally Three times a day      ezetimibe (ZETIA) 10 MG Tab TAKE ONE TABLET BY MOUTH DAILY 90 Tablet 2    pantoprazole (PROTONIX) 20 MG tablet TAKE ONE TABLET BY MOUTH DAILY 90 Tablet 2    fluticasone (FLONASE) 50 MCG/ACT nasal spray SPRAY TWO SPRAYS IN EACH NOSTRIL ONCE DAILY 48 mL 2    levothyroxine (SYNTHROID) 75 MCG Tab Take 1 Tablet by mouth every morning on an empty stomach. 1 Tablet 0    atorvastatin (LIPITOR) 80 MG tablet TAKE ONE TABLET BY MOUTH DAILY 90 Tablet 3    potassium chloride SA (K-DUR) 10 MEQ Tab CR TAKE ONE TABLET BY MOUTH DAILY 90 Tablet 3    naproxen (NAPROSYN) 500 MG Tab Take 1 Tablet by mouth 2 times a day as needed (pain). 60 Tablet 2    fexofenadine (ALLEGRA) 180 MG tablet       melatonin 5 mg Tab       ipratropium (ATROVENT) 0.03 % Solution PUT 1 TO 2 SPRAYS INTO EACH NOSTRIL ONE TO TWO TIMES DAILY      Calcium Polycarbophil (FIBER-CAPS PO) Take  by mouth.      BIOTIN PO Take  by mouth.      azelastine (ASTELIN) 137 MCG/SPRAY nasal spray Administer 1 Spray into affected nostril(S) 2 times a day.      Multiple Vitamin (MULTI-DAY PO) Take  by mouth.      Calcium Carbonate-Vitamin D (CALCIUM 600/VITAMIN D PO) Take  by mouth. Three times a week      cyanocobalamin (VITAMIN B-12) 500 MCG Tab Take 1 Tablet by mouth every day.      Cholecalciferol (VITAMIN D3 PO) Take  by mouth.      Glucosamine HCl (GLUCOSAMINE PO) Take  by mouth.      Omega-3 Fatty Acids (OMEGA 3 PO) Take  by mouth.      acyclovir (ZOVIRAX) 400 MG tablet Take 1 Tablet by mouth 2 times a  day.       No current facility-administered medications for this visit.       Tubular Adenoma  2004 no records path unknown  4/30/12 [facility] colon tubular adenoma, repeat 5 yrs  10/20/17 colon per [facility] 5 polyps pathology 4 polyps adenomatous and benign polypoid tissue, severe diverticulosis, recommend repeat colonoscopy 3 years  8/25/21 colonoscopy per [facility] three adenomas, repeat in 3 years     skin lesion   Sees  dermatology     sensorineural hearing loss  3/28/23  audiology note bilateral sensorineural hearing loss only at very high frequencies otherwise normal     schatzki ring  8/25/21 EGD per [facility] noted schatzki's ring 18 mm balloon dilation, small hiatal hernia, biopsies negative     pulmonary nodules  6/17/20 CT high-resolution thorax no evidence of interstitial thickening or honeycombing, scattered cystic lucencies, consideration lymphocytic interstitial pneumonitis, emphysema, lymphangiomyomatosis, focal groundglass opacities upper lobes, coronary calcifications  10/12/20 CT thorax stable 5 to 6 mm biapical groundglass nodules, no new nodules hyperinflated lungs, atherosclerosis with coronary calcification  5/27/21 CT thorax without, stable less than 6 mm biapical groundglass nodules, emphysematous changes lungs  7/2/21 pulmonary note symptoms improved with singulair but she was not able to tolerate this medication, she also does see allergy and will continue with intranasal therapy, repeat CT thorax in 1 year, follow-up 6 months   7/2/22 CT thorax without, stable biapical 5 to 6 mm groundglass lung nodules, underlying emphysema  11/10/22  pulmonary note chronic cough, presumed allergic, controlled at present, pulmonary nodule stable x2 years no indication for continued surveillance     Preventative health  1/19/12 pneumovax  2/5/15 prevnar  4/7/19 YOLANDA negative, EKG sinus, ultrasound aorta mild atherosclerotic changes, ultrasound carotid mild left plaque bulb done at [facility]  6/11/20  hep c ab negative   2/5/20 shingrix second done  8/25/21 colonoscopy per [facility] three adenomas, repeat in 3 years  10/1/22 covid bivalent  2/14/23 mammogram heterogeneously dense breast tissue recommend screening breast ultrasound  2/14/23 dexa LS-1.5,hip-2.0  2/17/23 tdap  2/23/23 vit d 58  3/7/23 sonocine negative      Osteopenia  10/08 dexa LS -1.0, hip -0.4  7/11 dexa LS -0.5,hip -1.2  2/13 vit d 50  3/15/14 vit d 31  3/25/15 vit d 33  6/12/15 dexa LS -0.6,hip -1.3;FRAX 10.5% major,hip 1.7%  3/7/16 cxr mid and upper thoracic spine compression fracture  5/17/16 vit d 29 take extra 2000 units daily  9/5/17 dexa LS-0.7,hip-1.2  9/5/17 vit d 42  6/11/20 vit d 44  6/18/20 dexa LS-0.6,hip-1.4  2/10/22 vit d 43  2/14/23 dexa LS-1.5,hip-2.0  2/23/23 vit d 58     Ocular migraine     Microscopic hematuria  9/5/17 UA 2-5 RBC  9/7/17 urine culture negative, repeat UA 2 weeks, order in system  9/27/17 UA 2-5 RBC, will order us renal  10/4/17 ultrasound renal negative repeat urinalysis 2 weeks, if still positive will check CT urogram and urology evaluation  10/31/17 UA no RBC urine culture negative     Impaired glucose metabolism  10/19/18 A1c 6.2%  6/11/20 A1c 5.8%  2/10/22 A1c 6.1%  2/21/23 A1c 6.0%     HSV acyclovir as needed     History thyroid cancer  8/07 s/p thyroidectomy 1.1 cm no evid of adenopathy sees   3/11  [facility] note u/s thyroid in june, on levoxyl 100 mg  6/11 tsh 0.33, on levoxyl 100 mcg  1/12 tsh 3.9 on levoxyl 100 mcg followed by   8/12 dr.abbott saunders note, tsh 0.05,free t4 1.46, thyroglobulin <0.9, decrease levothyroxine to 100 mcg. Repeat thyroid ultrasound February 1/13 tsh 0.1, thyroglobulin less than 0.1; dr.abbott saunders note no changes  1/29/14 tsh 0.17,free t4 1.0,thyroglobulin <0.1 on levothyroxine 100 mcg  2/25/14 tsh 0.12, free t4, free t4 1.2,thyroglobulin 0.1 on levothyroxine 100 mcg  9/5/14 tsh 0.13,free t4 1.1 on levothyroxine 100 mcg one pill six days per week ,1/2 pill  on day 7  9/20/14  endocrine note  1/6/15 tsh 1.5,free t4 0.99 on levothyroxine 100 mcg 6 days per week and 75 mcg one day per week  1/13/15  endocrine note  3/24/15 tsh 0.3,free t4 1.5 on levothyroxine 100 mcg 6 days per week  4/2/15  endocrine note continue same dose levothyroxine and follow up 6 months  9/30/15 tsh 0.09,free t4 1.3, thyroglobulin<0.1, antithyroglobulin antibody less<0.9; on synthroid 100 mcg 6 days a week, synthroid 75 mcg one day per week  10/6/15  endocrinology note decrease total thyroid dose by 25 mcg during the week  5/17/16 tsh 0.6,free t4 0.9 on synthroid 100 mcg 6 days per week and 50 mcg 1 day per week? Per  endocrinology  5/15/17 tsh 1.1,free t4 1.1 on levothyroxine 100 mcg six days per week  5/18/17  endocrine note change to levothyroxine 75 mcg daily   11/27/17 tsh 0.15,free t4 1.26, thyroglbulin< 0.1, antithyroglobulin < 0.9  followed by  endocrinology  1/19/18 tsh 0.9 on levothyroxine per  endocrinology  4/17/18 tsh 0.7, free t4 1.2 on levothyroxine per  endocrinology  10/18/18 tsh 0.3,free t4 1.2,on levothyroxine 100 mcg six days and 1/2 tab day 7  10/24/18  endocrine note change levothyroxine to half a tablet twice a week, full tablet 5 days a week, recheck labs after  1/23/19 tsh 1.6,free t4 0.98, thyroglobulin less than 0.1, antithyroglobulin less than 0.9 per endocrinology  6/21/19 tsh 0.37, free t4 1.13 on levothyroxine 100 mcg  half a tablet twice a week, full tablet 5 days a week per  endocrinology  10/16/19 tsh 0.4, free t4 1.3    12/30/19 tsh 0.6,free t4 1.02 on levothyroxine 88 mcg six days per week and 1/2 tab one day per week per endocrine   1/6/20  endocrine note continue on levothyroxine 88 mcg six days per week and 1/2 tab one day per week  7/10/20 leonid pearl endocrine note with , tsh 1.9, free t4 1.3, thyroglobulin<0.1, thyroglobulin antibody<0.9,  on levothyroxine 88 mcg six days per week and 1/2 tab one day per week, change to levothyroxine 75 mcg daily  10/7/20 tsh 0.13,free t4 1.5 per endocrinology  6/11/20 thyroglobulin less than 0.1, thyroglobulin antibody less than 0.9 on levothyroxine 88 mcg 6 days a week, levothyroxine 75 mcg 1 day a week  10/7/20 tsh 1.19, free t4 1.56 on levothyroxine 88 mcg 6 days a week, levothyroxine 75 mcg 1 day a week  10/25/20 leonid kang at  endocrine note on levothyroxine 88 mcg 6 days a week, levothyroxine 75 mcg 1 day a week  12/8/20 leonid kang at  endocrine note tsh 0.71,free t4 1.3,  on levothyroxine 88 mcg 6 days a week, and levothyroxine 125 mcg 1/2 tab qday  4/12/21 tsh 0.5,free t4 1.5  4/19/21  endocrine note on levothyroxine 88 mcg 6 days a week, and levothyroxine 125 mcg 1/2 tab 1 day a week  11/4/21 tsh 1.1,free t4 1.6 on levothyroxine 88 mcg 6 days a week, and levothyroxine 125 mcg 1/2 tab 1 day a week per   2/10/22 tsh 0.9 on levothyroxine 88 mcg 6 days a week, and levothyroxine 125 mcg 1/2 tab 1 day a week per  endocrine  8/15/22 tsh 0.4,free t4 1.56 on levothyroxine 88 mcg six days per week change to 88 mcg six days per week and 1/2 tab one day per week   8/25/22 vanessa cox at [facility] endocrine note () on levothyroxine 88 mcg 6 days per week and 1/2 tab one day per week   11/1/22 tsh 0.9,free t4 1.5,thyroglobulin<0.1,thyroglobulin ab<0.9 on levothyroxine 88 mcg 6 days per week and 1/2 tab one day per week   11/10/22 vanessa cox at [facility] endocrine note () continue on levothyroxine 88 mcg 6 days per week and 1/2 tab one day per week  3/30/23 tsh 0.64, free t4 1.48 on levothyroxine 88 mcg 6 days and 1/2 tab day 7  4/13/23  endocrine note on levothyroxine 88 mcg 6 days and 1/2 tab day 7 change to levothyroxine 75 mcg daily    History depression  Since 2001, having failed celexa and effexor?, lexapro  working well  4/10 samples cymbalta  6/11 off cymbalta  7/24/14 trial wellbutrin 150 mg, PHQ-9 17  8/19/14 behavioral health note; pt will follow up with psychotherapy  9/22/14 increase wellbutrin to 300 mgdepression  10/21/14 PHQ 14  10/21/14 decrease wellbutrin to 150 mg and add effexor XR 75 mg qday  1/25/15 off wellbutrin  2/13/23 depression screening score 0     History compression fracture  11/10/04 thoracic x-ray negative  3/4/14 cxr mid apex right lateral curvature thoracic spine may be positional or from mild scoliosis  3/7/16 cxr mid and upper thoracic spine compression fracture  9/5/17 dexa LS-0.7,hip-1.2  6/18/20 dexa LS-0.6,hip-1.4  2/10/22 vit d 43  2/14/23 dexa LS-1.5,hip-2.0     Dyslipidemia  2/09 chol 204,trig 130, hdl 62, ldl 116  5/10 chol 142,trig 63,hdl 64,ldl 65  6/11 chol 120,trig 65,hdl 68,ldl 39 on mevacor and niaspan  1/12 chol 98,trig 55,hdl 40,ldl 47 on mevacor and niaspan; ok to stop niaspan  2/13 chol 172,trig 314,hdl 45,ldl 64 on mevacor 40 mg; start fish oil 2 bid   3/15/14 chol 198,trig 101,hdl 54,ldl 124 on mevacor 40 mg and fish oil  7/29/14 dietary evaluation and education  3/25/15 chol 175,trig 107,hdl 54,ldl 100 on mevacor 40 mg  5/17/16 chol 196,trig 105,hdl 52,ldl 123 on mevacor 40 mg  9/5/17 chol 206,trig 139,hdl 48,ldl 130 on mevacor 40 mg  9/8/17 change to lipitor 20 mg and repeat labs in 4 weeks  11/1/17 chol 179,trig 168,hdl 44,ldl 101 on lipitor 20 mg  10/19/18 chol 188,trig 170,hdl 47,ldl 107 on lipitor 20 mg  6/11/20 chol 181,trig 137,hdl 49,ldl 105 on lipitor 20 mg  10/12/20 CT thorax atherosclerosis with coronary calcification  11/12/20 CT coronary calcium score: LMA 0.0, LCx 211.5, .3, .5, .2 = 1169.4, probable dilation left ventricle and left atrium, linear density lingula and left lower lobe consistent with scar or atelectasis  11/12/20 increase lipitor to 80 mg repeat labs 4 weeks, stress echo ordered  2/8/21 chol 143,trig 151,hdl 41,ldl 72  on lipitor 80 mg  3/16/21 stress echo negative for ischemia, appropriate augmentation LV function after maximal exercise, EF 60%  4/13/21 ultrasound carotid <50% bilateral stenosis  2/10/22 chol 174,trig 110,hdl 50,ldl 102 on lipitor 80 mg  2/22/22 start zetia 10 mg, continue on lipitor 80 mg  5/26/22 chol 149,trig 106,hdl 51,ldl 77 on lipitor 80 mg and zetia 10 mg   2/21/23 chol 155,trig 120,hdl 50,ldl 81 on lipitor 80 mg and zetia 10 mg     cough  10/11/18 cough chronic in nature, question allergic rhinitis although treatment ineffective for preventing cough, tried prilosec over-the-counter no benefit  10/11/18 cxr negative  11/8/18 PFT FEV1 1.8 (87% predicted), FVC 2.5 (91% predicted), FEV/FVC 74%, no bronchodilator response, DLCO 120% predicted, normal pulmonary function testing   11/12/18 symptoms somewhat improved on prilosec 20 mg increased to 40 mg  1/6/20 trial protonix, CT high resolution ordered, referral allergy  6/17/20 CT high-resolution thorax no evidence of interstitial thickening or honeycombing, scattered cystic lucencies, consideration lymphocytic interstitial pneumonitis, emphysema, lymphangiomyomatosis, focal groundglass opacities upper lobes, coronary calcifications  7/8/20 pulmonary note start spiriva, repeat CT in 3 months  10/12/20 CT thorax stable 5 to 6 mm biapical groundglass nodules, no new nodules hyperinflated lungs, atherosclerosis with coronary calcification  10/28/20  allergy note cough, proceed with aeroallergens skin testing, continue flonase, start sinus saline rinse, consider ENT or GI referral if allergy testing is negative, consider trial of singulair or atrovent nasal spray  11/3/20 pulmonary note chronic cough, off spiriva, suspect GERD, consider ENT assessment vocal cords, follow-up 6 months  2/16/21  allergy note cough, positive aeroallergens skin test on 11/3/20 positive to trees, grasses, weeds, one mold, cat, guinea pig, horse, rabbit and dust mite,  continue xyxal, astelin, atrovent nasal, flonase, trial of stopping montelukast to see if fatigue improves, possible ENT evaluation chronic sinusitis, continue pantoprazole  3/10/21  ENT note flexible laryngoscopy, right nasal septal deviation, interarytenoid edema, cough is better with aggressive nasal regimen and PPI, follow-up GI for dysphagia, will obtain CT sinus, continue rinses bid, PPI before dinner, stop astelin  4/13/21 CT maxillofacial without plus reconstruction, no evidence of sinus disease, convex left nasal septal deviation  4/29/21 [facility] note previous aeroallergens skin test positive to trees, grasses, weeds, mold, cat, guinea pig, horse, rabbit and dust mite, cough is improved with ipratropium and astelin, xyzal, pantoprazole, off montelukast, with improvement in symptoms patient would like to hold off on immunotherapy, continue xyzal consider adding allegra 180 mg in am, follow-up ENT chronic sinusitis   5/19/21 [facility] note chronic cough, suspect more allergy related given improvement after allergy sprays, although she did start PPI at the same time, also has had esophageal dysphagia on occasion with solid foods, will schedule EGD for evaluation  5/27/11 CT thorax without, stable less than 6 mm biapical groundglass nodules, emphysematous changes lungs  7/2/21 pulmonary note symptoms improved with singulair but she was not able to tolerate this medication, she also does see allergy and will continue with intranasal therapy, repeat CT thorax in 1 year, follow-up 6 months  8/25/21 EGD per [facility] noted schatzki's ring 18 mm balloon dilation, small hiatal hernia  8/27/21  allergy note cough chronic, has had work-up by pulmonary previously, number of positive testing on arrival allergy skin test, patient declines immunotherapy, continue xyzal 5 mg, flonase, astelin and atrovent nasal, did not tolerate singulair, and can add allegra 180 mg   12/7/21 stop protonix  3/10/22   allergy note on flonase, astelin, atrovent nasal, patient had dry cough for 10 to 20 years, has seen pulmonary previously, with aeroallergens skin test 11/3/20 positive for trees, grasses, weeds, mold, cat, guinea pig, horse, rabbit and dust mite, continue allegra 180 mg am, continue flonase, recommend trial of over-the-counter aasalcrom 1-2 times per day, increase astelin to twice daily, and can increase ipratropium to 3 times daily, cannot tolerate montelukast, patient declines food skin testing  6/23/22  allergy note patient declines immunotherapy and food skin testing,continue allegra,flonase,atrovent nasal,increase astelin to 2 sprays daily,use nasalcrom and if no improvement stop nasalcrom,continue protonix 20 mg, did not tolerate singulair  7/2/22 CT thorax without, stable biapical 5 to 6 mm groundglass lung nodules, underlying emphysema  11/10/22  allergy note positive aeroallergens skin testing but patient is not interested in immunotherapy, recommend increasing allegra to 180 mg bid for 2 weeks to see if cough improves, start atrovent nasal spray 2 to 3 times per day, continue flonase, nasalcrom, astelin, could not tolerate montelukast, continue pantoprazole  11/10/22  pulmonary note chronic cough, presumed allergic, controlled at present, pulmonary nodule stable x2 years no indication for continued surveillance  2/23/23 b12 1326,vit d 58,magnesium 2.0 on protonix  5/4/23  allergy note patient not interested in aero allergen immunotherapy, cannot tolerate singulair, consider trial of allegra 180 mg bid for 7 to 10 days to see if benefits cough, continue atrovent nasal,flonase,nasalcrom,astelin, protonix 20 mg     Coronary artery calcification  6/17/20 CT high-resolution thorax,coronary calcifications  10/12/20 CT thorax atherosclerosis with coronary calcification  11/12/20 CT coronary calcium score: LMA 0.0, LCx 211.5, .3, .5, .2 = 1169.4, probable  dilation left ventricle and left atrium, linear density lingula and left lower lobe consistent with scar or atelectasis  3/16/21 stress echo negative for ischemia, appropriate augmentation LV function after maximal exercise, EF 60%  4/13/21 ultrasound carotid <50% bilateral stenosis     Allergic rhinitis  10/28/20  allergy note cough, proceed with aeroallergens skin testing, continue flonase, start sinus saline rinse, consider ENT or GI referral if allergy testing is negative, consider trial of singulair or atrovent nasal spray  11/3/20  allergy note, aeroallergy skin test positive to trees, grasses, weeds, mold, cat, guinea pig, horse, rabbit, dust mite, recommend addition of astelin nasal spray to montelukast  2/16/21  allergy note cough, positive aeroallergens skin test on 11/3/20 positive to trees, grasses, weeds, one mold, cat, guinea pig, horse, rabbit and dust mite, continue xyxal, astelin, atrovent nasal, flonase, trial of stopping montelukast to see if fatigue improves, possible ENT evaluation chronic sinusitis, continue pantoprazole  3/10/21  ENT note flexible laryngoscopy, right nasal septal deviation, interarytenoid edema, cough is better with aggressive nasal regimen and PPI, follow-up GI for dysphagia, will obtain CT sinus, continue rinses bid, PPI before dinner, stop astelin  4/23/21 CT maxillofacial without plus reconstruction, no evidence of sinus disease, convex left nasal septal deviation  4/29/21 [facility] note previous aeroallergens skin test positive to trees, grasses, weeds, mold, cat, guinea pig, horse, rabbit and dust mite, cough is improved with ipratropium and astelin, xyzal, pantoprazole, off montelukast, with improvement in symptoms patient would like to hold off on immunotherapy, continue xyzal consider adding allegra 180 mg in am, follow-up ENT chronic sinusitis  5/19/21 [facility] note chronic cough, suspect more allergy related given improvement after  allergy sprays, although she did start PPI at the same time, also has had esophageal dysphagia on occasion with solid foods, will schedule EGD for evaluation  8/27/21  allergy note cough chronic, has had work-up by pulmonary previously, number of positive testing on arrival allergy skin test, patient declines immunotherapy, continue xyzal 5 mg, flonase, astelin and atrovent nasal, did not tolerate singulair, and can add allegra 180 mg    11/30/21  allergy aeroallergens skin test positive to trees, grasses, weeds, mold, cat, guinea pig, horse, rabbit, dust mite  12/7/21  allergy no cough seen by pulmonary, tried a no steroid no benefit, offered immunotherapy patient declines, continue allegra, flonase, astelin, atrovent nasal, protonix, cannot tolerate singulair, follow-up ENT, pulmonary, can try over-the-counter nasalcrom  3/10/22  allergy note on flonase, astelin, atrovent nasal, patient had dry cough for 10 to 20 years, has seen pulmonary previously, with aeroallergens skin test 11/3/20 positive for trees, grasses, weeds, mold, cat, guinea pig, horse, rabbit and dust mite, continue allegra 180 mg am, continue flonase, recommend trial of over-the-counter aasalcrom 1-2 times per day, increase astelin to twice daily, and can increase ipratropium to 3 times daily, cannot tolerate montelukast, patient declines food skin testing  6/23/22  allergy note patient declines immunotherapy and food skin testing,continue allegra,flonase,atrovent nasal,increase astelin to 2 sprays daily,use nasalcrom and if no improvement stop nasalcrom,continue protonix 20 mg, did not tolerate singulair  11/10/22  allergy note positive aeroallergens skin testing but patient is not interested in immunotherapy, recommend increasing allegra to 180 mg bid for 2 weeks to see if cough improves, start atrovent nasal spray 2 to 3 times per day, continue flonase, nasalcrom, astelin, could not tolerate  "montelukast, continue pantoprazole  5/4/23  allergy note patient not interested in aero allergen immunotherapy, cannot tolerate singulair, consider trial of allegra 180 mg bid for 7 to 10 days to see if benefits cough, continue atrovent nasal,flonase,nasalcrom,astelin, protonix 20 mg  xyzal cause drowsiness              Patient Active Problem List    Diagnosis Date Noted    History of COVID-19 08/11/2023    Sensorineural hearing loss 04/06/2023    Schatzki's ring 08/25/2021    Allergic rhinitis 11/02/2020    Pulmonary nodules benign 07/08/2020    Coronary artery calcification 06/19/2020    Skin lesion 01/06/2020    Impaired glucose metabolism 10/19/2018    Microscopic hematuria 11/03/2017    History of HSV infection 05/10/2016    Cough 03/07/2016    History of compression fracture of spine 03/07/2016    Ocular migraine 01/24/2013    Osteopenia 07/06/2011    History of thyroid cancer 05/15/2009    Dyslipidemia 05/15/2009    History of depression 05/15/2009    Tubular adenoma of colon 05/15/2009    Preventative health care 05/15/2009        Objective:   Temp 37 °C (98.6 °F)   Ht 1.6 m (5' 3\")   Wt 63.5 kg (140 lb)   BMI 24.80 kg/m²     Physical Exam:   Constitutional: Alert, no distress, well-groomed.  Skin: No rashes in visible areas.  Eye: Round. Conjunctiva clear, lids normal. No icterus.   ENMT: Lips pink without lesions, good dentition, moist mucous membranes. Phonation normal.  Neck: No masses, no thyromegaly. Moves freely without pain.  Respiratory: Unlabored respiratory effort, no cough or audible wheeze  Psych: Alert and oriented x3, normal affect and mood.     Assessment and Plan:   The following treatment plan was discussed:    Assessment  #!  Mid left back pain for last 10 days since returning from a trip, also tested positive for COVID at that time, unclear whether this could be pleurisy related to COVID, without history of trauma do not suspect compression fracture, she states her cough is " improving, no shortness of breath as less likely would be pneumonia with COVID as she is improving, she did not receive Paxlovid, also consideration for musculoskeletal arthritis or myalgias related to COVID.  She is on statin therapy for dyslipidemia but has no previous myalgias with Lipitor    #2 positive COVID test August 6, did not take Paxlovid, cough improving, no fever, no shortness of breath her bivalent vaccine was in October of last year    #3 history of mild thoracic compression fracture incidentally diagnosed by chest x-ray years ago, osteopenia lumbar spine bone density test in February    #4 dyslipidemia on Lipitor no previous myalgias with the medication          Plan  #1  Ideally we would like to examine her but that is not possible via telemedicine so I cannot auscultate her lungs or examine her back, does not appear to be shingles, without history of trauma do not believe the x-ray thoracic spine is necessary, I would like to obtain a chest x-ray since she did test positive for COVID and there is a pleuritic component, no history of pulmonary embolism, no hemoptysis, she does not feel short of breath    #2 cxr ordered she can have that done at any [facility]    #3 trial of flexeril 5 mg bid as needed quantity 15 for component of muscle spasm, she can continue NyQuil at night, medication may cause drowsiness, sedation, do not take this at night with NyQuil    #4 advised her that fatigue related to COVID may last weeks to months and the cough may last 4 to 6 weeks in total although it is improving    #5 if her back pain does not improve I would like to see her in office but we will start off with a chest x-ray for now, avoid heavy lifting, bending, twisting or physical activity for now

## 2023-09-06 ENCOUNTER — APPOINTMENT (RX ONLY)
Dept: URBAN - METROPOLITAN AREA CLINIC 4 | Facility: CLINIC | Age: 78
Setting detail: DERMATOLOGY
End: 2023-09-06

## 2023-09-06 DIAGNOSIS — D22 MELANOCYTIC NEVI: ICD-10-CM

## 2023-09-06 DIAGNOSIS — Z85.828 PERSONAL HISTORY OF OTHER MALIGNANT NEOPLASM OF SKIN: ICD-10-CM

## 2023-09-06 DIAGNOSIS — L72.8 OTHER FOLLICULAR CYSTS OF THE SKIN AND SUBCUTANEOUS TISSUE: ICD-10-CM

## 2023-09-06 DIAGNOSIS — L82.1 OTHER SEBORRHEIC KERATOSIS: ICD-10-CM

## 2023-09-06 DIAGNOSIS — L90.5 SCAR CONDITIONS AND FIBROSIS OF SKIN: ICD-10-CM

## 2023-09-06 DIAGNOSIS — L81.4 OTHER MELANIN HYPERPIGMENTATION: ICD-10-CM | Status: STABLE

## 2023-09-06 PROBLEM — D22.5 MELANOCYTIC NEVI OF TRUNK: Status: ACTIVE | Noted: 2023-09-06

## 2023-09-06 PROBLEM — D48.5 NEOPLASM OF UNCERTAIN BEHAVIOR OF SKIN: Status: ACTIVE | Noted: 2023-09-06

## 2023-09-06 PROCEDURE — ? BIOPSY BY SHAVE METHOD

## 2023-09-06 PROCEDURE — ? OBSERVATION AND MEASURE

## 2023-09-06 PROCEDURE — 99213 OFFICE O/P EST LOW 20 MIN: CPT | Mod: 25

## 2023-09-06 PROCEDURE — 11102 TANGNTL BX SKIN SINGLE LES: CPT

## 2023-09-06 PROCEDURE — ? COUNSELING

## 2023-09-06 PROCEDURE — ? DIAGNOSIS COMMENT

## 2023-09-06 ASSESSMENT — LOCATION ZONE DERM
LOCATION ZONE: FACE
LOCATION ZONE: NECK
LOCATION ZONE: TRUNK
LOCATION ZONE: LEG

## 2023-09-06 ASSESSMENT — LOCATION DETAILED DESCRIPTION DERM
LOCATION DETAILED: LEFT CENTRAL ZYGOMA
LOCATION DETAILED: RIGHT ANTERIOR LATERAL DISTAL THIGH
LOCATION DETAILED: SUPERIOR LUMBAR SPINE
LOCATION DETAILED: LEFT BUTTOCK
LOCATION DETAILED: RIGHT BUTTOCK
LOCATION DETAILED: RIGHT DISTAL PRETIBIAL REGION
LOCATION DETAILED: LEFT SUPERIOR UPPER BACK
LOCATION DETAILED: MID TRAPEZIAL NECK
LOCATION DETAILED: RIGHT MEDIAL POSTERIOR ANKLE

## 2023-09-06 ASSESSMENT — LOCATION SIMPLE DESCRIPTION DERM
LOCATION SIMPLE: RIGHT BUTTOCK
LOCATION SIMPLE: LEFT ZYGOMA
LOCATION SIMPLE: LOWER BACK
LOCATION SIMPLE: TRAPEZIAL NECK
LOCATION SIMPLE: RIGHT THIGH
LOCATION SIMPLE: RIGHT PRETIBIAL REGION
LOCATION SIMPLE: LEFT UPPER BACK
LOCATION SIMPLE: LEFT BUTTOCK
LOCATION SIMPLE: RIGHT ANKLE

## 2023-09-06 NOTE — PROCEDURE: BIOPSY BY SHAVE METHOD
Detail Level: Detailed
Depth Of Biopsy: dermis
Was A Bandage Applied: Yes
Size Of Lesion In Cm: 0.4
X Size Of Lesion In Cm: 0
Biopsy Type: H and E
Biopsy Method: Personna blade
Anesthesia Type: 1% lidocaine without epinephrine
Anesthesia Volume In Cc: 1
Hemostasis: Aluminum Chloride
Wound Care: Petrolatum
Dressing: bandage
Destruction After The Procedure: No
Type Of Destruction Used: Cryotherapy
Curettage Text: The wound bed was treated with curettage after the biopsy was performed.
Cryotherapy Text: The wound bed was treated with cryotherapy after the biopsy was performed.
Electrodesiccation Text: The wound bed was treated with electrodesiccation after the biopsy was performed.
Electrodesiccation And Curettage Text: The wound bed was treated with electrodesiccation and curettage after the biopsy was performed.
Silver Nitrate Text: The wound bed was treated with silver nitrate after the biopsy was performed.
Lab: [id number]
Lab Facility: 
Path Notes (To The Dermatopathologist): Please check margins
Consent: Verbal consent was obtained and risks were reviewed including but not limited to scarring, infection, bleeding, scabbing, incomplete removal, nerve damage and allergy to anesthesia.
Post-Care Instructions: I reviewed with the patient in detail post-care instructions. Patient is to keep the biopsy site dry overnight, and then apply bacitracin/petroleum twice daily until healed.
Notification Instructions: Patient will be notified of biopsy results. However, patient instructed to call the office if not contacted within 2 weeks.
Billing Type: Third-Party Bill
Information: Selecting Yes will display possible errors in your note based on the variables you have selected. This validation is only offered as a suggestion for you. PLEASE NOTE THAT THE VALIDATION TEXT WILL BE REMOVED WHEN YOU FINALIZE YOUR NOTE. IF YOU WANT TO FAX A PRELIMINARY NOTE YOU WILL NEED TO TOGGLE THIS TO 'NO' IF YOU DO NOT WANT IT IN YOUR FAXED NOTE.

## 2023-09-06 NOTE — PROCEDURE: DIAGNOSIS COMMENT
Comment: [id number]
Detail Level: Detailed
Render Risk Assessment In Note?: no
Comment: History of skin cancers
Detail Level: Simple
Comment: History of skin cancer
Comment: See photo from 09/12/2018
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

## 2023-10-11 ENCOUNTER — HOSPITAL ENCOUNTER (OUTPATIENT)
Dept: LAB | Facility: MEDICAL CENTER | Age: 78
End: 2023-10-11
Attending: INTERNAL MEDICINE
Payer: MEDICARE

## 2023-10-11 LAB
T4 FREE SERPL-MCNC: 1.46 NG/DL (ref 0.93–1.7)
TSH SERPL DL<=0.005 MIU/L-ACNC: 0.98 UIU/ML (ref 0.38–5.33)

## 2023-10-11 PROCEDURE — 84432 ASSAY OF THYROGLOBULIN: CPT

## 2023-10-11 PROCEDURE — 84439 ASSAY OF FREE THYROXINE: CPT

## 2023-10-11 PROCEDURE — 36415 COLL VENOUS BLD VENIPUNCTURE: CPT

## 2023-10-11 PROCEDURE — 86800 THYROGLOBULIN ANTIBODY: CPT

## 2023-10-11 PROCEDURE — 84443 ASSAY THYROID STIM HORMONE: CPT

## 2023-11-29 ENCOUNTER — PATIENT MESSAGE (OUTPATIENT)
Dept: HEALTH INFORMATION MANAGEMENT | Facility: OTHER | Age: 78
End: 2023-11-29

## 2024-02-09 NOTE — TELEPHONE ENCOUNTER
----- Message from Asael Redd M.D. sent at 9/5/2017  9:59 PM PDT -----  Called patient and left message twice  Please notify her that her bone density test shows:  (1) no significant change from her previous bone density test  (2) the strength of her spine is normal, she has a slight decrease in strength of her hip bone but it is not significant  (3) have her continue the vitamin D, exercise, and we will repeat her bone density test in 2 years   complains of pain/discomfort

## 2024-02-11 ENCOUNTER — TELEPHONE (OUTPATIENT)
Dept: MEDICAL GROUP | Facility: MEDICAL CENTER | Age: 79
End: 2024-02-11
Payer: MEDICARE

## 2024-02-11 PROBLEM — R93.1 ELEVATED CORONARY ARTERY CALCIUM SCORE: Status: ACTIVE | Noted: 2020-06-19

## 2024-02-11 PROBLEM — M17.10 ARTHRITIS OF KNEE: Status: ACTIVE | Noted: 2024-02-11

## 2024-02-11 NOTE — LETTER
[email or web address] [facility]  Asael Redd M.D.  [address]  [place] [address]  Fax: [phone]   Authorization for Release/Disclosure of   Protected Health Information   Name: LEONILA MAYS : 1945 SSN: [id number]   Address: [facility] [place] [address] Phone:    [phone] (home)    I authorize the entity listed below to release/disclose the PHI below to:   [email or web address] [facility]/Asael Redd M.D. and Asael Redd M.D.   Provider or Entity Name:                                                   [facility]   Address   City, [facility], [facility]   Phone:      Fax:             [phone]   Reason for request: continuity of care   Information to be released: OLD RECORDS   [  ] LAST COLONOSCOPY,  including any PATH REPORT and follow-up  [  ] LAST FIT/COLOGUARD RESULT [  ] LAST DEXA  [  ] LAST MAMMOGRAM  [  ] LAST PAP  [  ] LAST LABS [  ] RETINA EXAM REPORT  [  ] IMMUNIZATION RECORDS  [ x ] Release all info      [  ] Check here and initial the line next to each item to release ALL health information INCLUDING  _____ Care and treatment for drug and / or alcohol abuse  _____ HIV testing, infection status, or AIDS  _____ Genetic Testing    DATES OF SERVICE OR TIME PERIOD TO BE DISCLOSED: _____________  I understand and acknowledge that:  * This Authorization may be revoked at any time by you in writing, except if your health information has already been used or disclosed.  * Your health information that will be used or disclosed as a result of you signing this authorization could be re-disclosed by the recipient. If this occurs, your re-disclosed health information may no longer be protected by State or Federal laws.  * You may refuse to sign this Authorization. Your refusal will not affect your ability to obtain treatment.  * This Authorization becomes effective upon signing and will  on (date) __________.      If no date is indicated, this Authorization will  one (1) year from the signature  date.    Name: Ping Das  Signature:                Continuity of Care Date:   2/12/2024     PLEASE FAX REQUESTED RECORDS BACK TO: [phone]

## 2024-02-15 ENCOUNTER — TELEPHONE (OUTPATIENT)
Dept: MEDICAL GROUP | Facility: MEDICAL CENTER | Age: 79
End: 2024-02-15

## 2024-02-15 ENCOUNTER — OFFICE VISIT (OUTPATIENT)
Dept: MEDICAL GROUP | Facility: MEDICAL CENTER | Age: 79
End: 2024-02-15
Payer: MEDICARE

## 2024-02-15 VITALS
OXYGEN SATURATION: 94 % | WEIGHT: 147 LBS | TEMPERATURE: 98.5 F | BODY MASS INDEX: 26.05 KG/M2 | DIASTOLIC BLOOD PRESSURE: 70 MMHG | SYSTOLIC BLOOD PRESSURE: 126 MMHG | HEIGHT: 63 IN | RESPIRATION RATE: 16 BRPM | HEART RATE: 72 BPM

## 2024-02-15 DIAGNOSIS — Z23 NEED FOR VACCINATION FOR STREP PNEUMONIAE: ICD-10-CM

## 2024-02-15 DIAGNOSIS — M54.6 CHRONIC BILATERAL THORACIC BACK PAIN: ICD-10-CM

## 2024-02-15 DIAGNOSIS — E78.5 DYSLIPIDEMIA: ICD-10-CM

## 2024-02-15 DIAGNOSIS — R35.1 NOCTURIA: ICD-10-CM

## 2024-02-15 DIAGNOSIS — R79.0 LOW MAGNESIUM LEVEL: ICD-10-CM

## 2024-02-15 DIAGNOSIS — R31.29 MICROSCOPIC HEMATURIA: ICD-10-CM

## 2024-02-15 DIAGNOSIS — E53.8 B12 DEFICIENCY: ICD-10-CM

## 2024-02-15 DIAGNOSIS — Z12.31 ENCOUNTER FOR SCREENING MAMMOGRAM FOR BREAST CANCER: ICD-10-CM

## 2024-02-15 DIAGNOSIS — E55.9 VITAMIN D DEFICIENCY: ICD-10-CM

## 2024-02-15 DIAGNOSIS — R73.09 IMPAIRED GLUCOSE METABOLISM: ICD-10-CM

## 2024-02-15 DIAGNOSIS — K22.2 SCHATZKI'S RING: ICD-10-CM

## 2024-02-15 DIAGNOSIS — Z00.00 PREVENTATIVE HEALTH CARE: Chronic | ICD-10-CM

## 2024-02-15 DIAGNOSIS — Z00.00 MEDICARE ANNUAL WELLNESS VISIT, SUBSEQUENT: ICD-10-CM

## 2024-02-15 DIAGNOSIS — M54.50 LOW BACK PAIN, UNSPECIFIED BACK PAIN LATERALITY, UNSPECIFIED CHRONICITY, UNSPECIFIED WHETHER SCIATICA PRESENT: ICD-10-CM

## 2024-02-15 DIAGNOSIS — F32.5 MAJOR DEPRESSIVE DISORDER, SINGLE EPISODE, IN FULL REMISSION (HCC): ICD-10-CM

## 2024-02-15 DIAGNOSIS — Z85.850 HISTORY OF THYROID CANCER: Chronic | ICD-10-CM

## 2024-02-15 DIAGNOSIS — G89.29 CHRONIC BILATERAL THORACIC BACK PAIN: ICD-10-CM

## 2024-02-15 DIAGNOSIS — F32.5 DEPRESSION, MAJOR, IN REMISSION (HCC): ICD-10-CM

## 2024-02-15 PROCEDURE — 90677 PCV20 VACCINE IM: CPT | Performed by: INTERNAL MEDICINE

## 2024-02-15 PROCEDURE — 3074F SYST BP LT 130 MM HG: CPT | Performed by: INTERNAL MEDICINE

## 2024-02-15 PROCEDURE — 3078F DIAST BP <80 MM HG: CPT | Performed by: INTERNAL MEDICINE

## 2024-02-15 PROCEDURE — G0009 ADMIN PNEUMOCOCCAL VACCINE: HCPCS | Performed by: INTERNAL MEDICINE

## 2024-02-15 PROCEDURE — G0439 PPPS, SUBSEQ VISIT: HCPCS | Mod: 25 | Performed by: INTERNAL MEDICINE

## 2024-02-15 ASSESSMENT — ENCOUNTER SYMPTOMS: GENERAL WELL-BEING: GOOD

## 2024-02-15 ASSESSMENT — FIBROSIS 4 INDEX: FIB4 SCORE: 1.57

## 2024-02-15 ASSESSMENT — PATIENT HEALTH QUESTIONNAIRE - PHQ9: CLINICAL INTERPRETATION OF PHQ2 SCORE: 0

## 2024-02-15 ASSESSMENT — ACTIVITIES OF DAILY LIVING (ADL): BATHING_REQUIRES_ASSISTANCE: 0

## 2024-02-15 NOTE — PROGRESS NOTES
Subjective     Ping Das is a 78 y.o. female who presents with  medicare wellness          HPI          Medicare wellness  Current supplements: Reviewed  Chronic narcotic pain medicines: No  Allergies:  reviewed  Sees  endocrine  Sees  allergy  Sees dermatology  Sees optometry   Screening: Reviewed  Two daughters one in [place] and one in [place], 2 grandchildren in [place]  Depressive Symptoms: Denies feeling down, depressed or hopeless. Denies loss of interest or pleasure in doing things   ADLs: Denies needing help with using telephone, transportation, shopping, preparing meals, housework, laundry, or managing medication or money.    Independent with bathing, hygiene, feeding, toileting, dressing but low back pain at times, worse with doing ironing or dishwashing  Memory concerns: Denies difficulty remembering details of conversations, events and upcoming appointments.  Cooks at home one main meal per day at 1 pm usually chicken and veggie and popcorn snack, grain bread in am, has neighbors that help with snow removal, has help with her yard during the summer months  Coffee in am, soda diet for lunch, wine one glass 3-4 nights per day  Hearing problems: Denies.  Had hearing test last year  Recent falls no  Walks daily 30 to 60 minutes at a time a few days a week, no balance issues, no chest pain or shortness of breath with walking or activity.  Low back pain no radiation, low to mid back pain can be bilateral, typically worse with standing for long periods of time.  No radiation down her legs.  No regular NSAIDs  No heartburn, dry cough related to allergies and postnasal drip not worse with exercise  Occasional urinary urgency and frequency, no dysuria, no hematuria, no recurrent UTIs  Current social contact/activities: Reviewed      Current Outpatient Medications   Medication Sig Dispense Refill    ezetimibe (ZETIA) 10 MG Tab TAKE ONE TABLET BY MOUTH DAILY 90 Tablet 2    pantoprazole  (PROTONIX) 20 MG tablet TAKE ONE TABLET BY MOUTH DAILY 90 Tablet 2    fluticasone (FLONASE) 50 MCG/ACT nasal spray SPRAY TWO SPRAYS IN EACH NOSTRIL ONCE DAILY 48 mL 2    levothyroxine (SYNTHROID) 75 MCG Tab Take 1 Tablet by mouth every morning on an empty stomach. 1 Tablet 0    atorvastatin (LIPITOR) 80 MG tablet TAKE ONE TABLET BY MOUTH DAILY 90 Tablet 3    potassium chloride SA (K-DUR) 10 MEQ Tab CR TAKE ONE TABLET BY MOUTH DAILY 90 Tablet 3    naproxen (NAPROSYN) 500 MG Tab Take 1 Tablet by mouth 2 times a day as needed (pain). 60 Tablet 2    fexofenadine (ALLEGRA) 180 MG tablet       melatonin 5 mg Tab       ipratropium (ATROVENT) 0.03 % Solution PUT 1 TO 2 SPRAYS INTO EACH NOSTRIL ONE TO TWO TIMES DAILY      Calcium Polycarbophil (FIBER-CAPS PO) Take  by mouth.      BIOTIN PO Take  by mouth.      azelastine (ASTELIN) 137 MCG/SPRAY nasal spray Administer 1 Spray into affected nostril(S) 2 times a day.      Multiple Vitamin (MULTI-DAY PO) Take  by mouth.      Calcium Carbonate-Vitamin D (CALCIUM 600/VITAMIN D PO) Take  by mouth. Three times a week      cyanocobalamin (VITAMIN B-12) 500 MCG Tab Take 1 Tablet by mouth every day.      Cholecalciferol (VITAMIN D3 PO) Take  by mouth.      Glucosamine HCl (GLUCOSAMINE PO) Take  by mouth.      Omega-3 Fatty Acids (OMEGA 3 PO) Take  by mouth.      acyclovir (ZOVIRAX) 400 MG tablet Take 1 Tablet by mouth 2 times a day.      meloxicam (MOBIC) 7.5 MG Tab Take 1 Tablet by mouth every day. (Patient not taking: Reported on 2/15/2024) 30 Tablet 0    cyclobenzaprine (FLEXERIL) 5 mg tablet Take 1 Tablet by mouth 2 times a day as needed for Mild Pain or Muscle Spasms (back muscle pain). Do not take at night with nyquil (Patient not taking: Reported on 2/15/2024) 30 Tablet 0     No current facility-administered medications for this visit.         skin lesion   Sees  dermatology      sensorineural hearing loss  3/28/23  audiology note bilateral sensorineural hearing  loss only at very high frequencies otherwise normal     schatzki ring  8/25/21 EGD per [facility] noted schatzki's ring 18 mm balloon dilation, small hiatal hernia, biopsies negative     pulmonary nodules  6/17/20 CT high-resolution thorax no evidence of interstitial thickening or honeycombing, scattered cystic lucencies, consideration lymphocytic interstitial pneumonitis, emphysema, lymphangiomyomatosis, focal groundglass opacities upper lobes, coronary calcifications  10/12/20 CT thorax stable 5 to 6 mm biapical groundglass nodules, no new nodules hyperinflated lungs, atherosclerosis with coronary calcification  5/27/21 CT thorax without, stable less than 6 mm biapical groundglass nodules, emphysematous changes lungs  7/2/21 pulmonary note symptoms improved with singulair but she was not able to tolerate this medication, she also does see allergy and will continue with intranasal therapy, repeat CT thorax in 1 year, follow-up 6 months   7/2/22 CT thorax without, stable biapical 5 to 6 mm groundglass lung nodules, underlying emphysema  11/10/22  pulmonary note chronic cough, presumed allergic, controlled at present, pulmonary nodule stable x2 years no indication for continued surveillance  8/17/23 chest x-ray negative     Preventative health  1/19/12 pneumovax  2/5/15 prevnar  4/7/19 YOLANDA negative, EKG sinus, ultrasound aorta mild atherosclerotic changes, ultrasound carotid mild left plaque bulb done at [facility]  6/11/20 hep c ab negative   2/5/20 shingrix second done  8/25/21 colonoscopy per [facility] three adenomas, repeat in 3 years  2/14/23 mammogram heterogeneously dense breast tissue recommend screening breast ultrasound  2/14/23 dexa LS-1.5,hip-2.0  2/17/23 tdap  2/23/23 vit d 58  3/7/23 sonocine negative    9/12/23 flu  9/12/23 rsv     Osteopenia  10/08 dexa LS -1.0, hip -0.4  7/11 dexa LS -0.5,hip -1.2  2/13 vit d 50  3/15/14 vit d 31  3/25/15 vit d 33  6/12/15 dexa LS -0.6,hip -1.3;FRAX 10.5% major,hip  1.7%  3/7/16 cxr mid and upper thoracic spine compression fracture  5/17/16 vit d 29 take extra 2000 units daily  9/5/17 dexa LS-0.7,hip-1.2  9/5/17 vit d 42  6/11/20 vit d 44  6/18/20 dexa LS-0.6,hip-1.4  2/10/22 vit d 43  2/14/23 dexa LS-1.5,hip-2.0  2/23/23 vit d 58     Ocular migraine     Microscopic hematuria  9/5/17 UA 2-5 RBC  9/7/17 urine culture negative, repeat UA 2 weeks, order in system  9/27/17 UA 2-5 RBC, will order us renal  10/4/17 ultrasound renal negative repeat urinalysis 2 weeks, if still positive will check CT urogram and urology evaluation  10/31/17 UA no RBC urine culture negative  2/12/23 UA 0-2 RBC    knee arthritis  10/13/23 x-ray right knee degenerative changes bilateral most notable medial compartments, chondrocalcinosis noted bilateral  10/13/23 KATIE note right knee pain, celestone 2 cc steroid injection performed     Impaired glucose metabolism  10/19/18 A1c 6.2%  6/11/20 A1c 5.8%  2/10/22 A1c 6.1%  2/21/23 A1c 6.0%     HSV acyclovir as needed     History thyroid cancer  8/07 s/p thyroidectomy 1.1 cm no evid of adenopathy sees   3/11 dr.abbott saunders note u/s thyroid in june, on levoxyl 100 mg  6/11 tsh 0.33, on levoxyl 100 mcg  1/12 tsh 3.9 on levoxyl 100 mcg followed by   8/12 dr.abbott saunders note, tsh 0.05,free t4 1.46, thyroglobulin <0.9, decrease levothyroxine to 100 mcg. Repeat thyroid ultrasound February 1/13 tsh 0.1, thyroglobulin less than 0.1; dr.abbott saunders note no changes  1/29/14 tsh 0.17,free t4 1.0,thyroglobulin <0.1 on levothyroxine 100 mcg  2/25/14 tsh 0.12, free t4, free t4 1.2,thyroglobulin 0.1 on levothyroxine 100 mcg  9/5/14 tsh 0.13,free t4 1.1 on levothyroxine 100 mcg one pill six days per week ,1/2 pill on day 7  9/20/14  endocrine note  1/6/15 tsh 1.5,free t4 0.99 on levothyroxine 100 mcg 6 days per week and 75 mcg one day per week  1/13/15  endocrine note  3/24/15 tsh 0.3,free t4 1.5 on levothyroxine 100 mcg 6 days per  week  4/2/15  endocrine note continue same dose levothyroxine and follow up 6 months  9/30/15 tsh 0.09,free t4 1.3, thyroglobulin<0.1, antithyroglobulin antibody less<0.9; on synthroid 100 mcg 6 days a week, synthroid 75 mcg one day per week  10/6/15  endocrinology note decrease total thyroid dose by 25 mcg during the week  5/17/16 tsh 0.6,free t4 0.9 on synthroid 100 mcg 6 days per week and 50 mcg 1 day per week? Per  endocrinology  5/15/17 tsh 1.1,free t4 1.1 on levothyroxine 100 mcg six days per week  5/18/17  endocrine note change to levothyroxine 75 mcg daily   11/27/17 tsh 0.15,free t4 1.26, thyroglbulin< 0.1, antithyroglobulin < 0.9  followed by  endocrinology  1/19/18 tsh 0.9 on levothyroxine per  endocrinology  4/17/18 tsh 0.7, free t4 1.2 on levothyroxine per  endocrinology  10/18/18 tsh 0.3,free t4 1.2,on levothyroxine 100 mcg six days and 1/2 tab day 7  10/24/18  endocrine note change levothyroxine to half a tablet twice a week, full tablet 5 days a week, recheck labs after  1/23/19 tsh 1.6,free t4 0.98, thyroglobulin less than 0.1, antithyroglobulin less than 0.9 per endocrinology  6/21/19 tsh 0.37, free t4 1.13 on levothyroxine 100 mcg  half a tablet twice a week, full tablet 5 days a week per  endocrinology  10/16/19 tsh 0.4, free t4 1.3    12/30/19 tsh 0.6,free t4 1.02 on levothyroxine 88 mcg six days per week and 1/2 tab one day per week per endocrine   1/6/20  endocrine note continue on levothyroxine 88 mcg six days per week and 1/2 tab one day per week  7/10/20 leonid kang endocrine note with , tsh 1.9, free t4 1.3, thyroglobulin<0.1, thyroglobulin antibody<0.9, on levothyroxine 88 mcg six days per week and 1/2 tab one day per week, change to levothyroxine 75 mcg daily  10/7/20 tsh 0.13,free t4 1.5 per endocrinology  6/11/20 thyroglobulin less than 0.1, thyroglobulin antibody less than 0.9 on  levothyroxine 88 mcg 6 days a week, levothyroxine 75 mcg 1 day a week  10/7/20 tsh 1.19, free t4 1.56 on levothyroxine 88 mcg 6 days a week, levothyroxine 75 mcg 1 day a week  10/25/20 leonid kang at  endocrine note on levothyroxine 88 mcg 6 days a week, levothyroxine 75 mcg 1 day a week  12/8/20 leonid kang at  endocrine note tsh 0.71,free t4 1.3,  on levothyroxine 88 mcg 6 days a week, and levothyroxine 125 mcg 1/2 tab qday  4/12/21 tsh 0.5,free t4 1.5  4/19/21  endocrine note on levothyroxine 88 mcg 6 days a week, and levothyroxine 125 mcg 1/2 tab 1 day a week  11/4/21 tsh 1.1,free t4 1.6 on levothyroxine 88 mcg 6 days a week, and levothyroxine 125 mcg 1/2 tab 1 day a week per   2/10/22 tsh 0.9 on levothyroxine 88 mcg 6 days a week, and levothyroxine 125 mcg 1/2 tab 1 day a week per  endocrine  8/15/22 tsh 0.4,free t4 1.56 on levothyroxine 88 mcg six days per week change to 88 mcg six days per week and 1/2 tab one day per week   8/25/22 vanessa cox at [facility] endocrine note () on levothyroxine 88 mcg 6 days per week and 1/2 tab one day per week   11/1/22 tsh 0.9,free t4 1.5,thyroglobulin<0.1,thyroglobulin ab<0.9 on levothyroxine 88 mcg 6 days per week and 1/2 tab one day per week   11/10/22 vanessa cox at [facility] endocrine note () continue on levothyroxine 88 mcg 6 days per week and 1/2 tab one day per week  3/30/23 tsh 0.64, free t4 1.48 on levothyroxine 88 mcg 6 days and 1/2 tab day 7  4/13/23  endocrine note on levothyroxine 88 mcg 6 days and 1/2 tab day 7 change to levothyroxine 75 mcg daily  10/11/23 tsh 0.98,free t4 1.46,thyroglobulin<0.1 on synthroid 75 mcg daily     History depression  Since 2001, having failed celexa and effexor?, lexapro working well  4/10 samples cymbalta  6/11 off cymbalta  7/24/14 trial wellbutrin 150 mg, PHQ-9 17  8/19/14 behavioral health note; pt will follow up with  psychotherapy  9/22/14 increase wellbutrin to 300 mgdepression  10/21/14 PHQ 14  10/21/14 decrease wellbutrin to 150 mg and add effexor XR 75 mg qday  1/25/15 off wellbutrin  2/13/23 depression screening score 0     History compression fracture  11/10/04 thoracic x-ray negative  3/4/14 cxr mid apex right lateral curvature thoracic spine may be positional or from mild scoliosis  3/7/16 cxr mid and upper thoracic spine compression fracture  9/5/17 dexa LS-0.7,hip-1.2  6/18/20 dexa LS-0.6,hip-1.4  2/10/22 vit d 43  2/14/23 dexa LS-1.5,hip-2.0    Elevated ct cardiac calcium  6/17/20 CT high-resolution thorax,coronary calcifications  10/12/20 CT thorax atherosclerosis with coronary calcification  11/12/20 CT coronary calcium score: LMA 0.0, LCx 211.5, .3, .5, .2 = 1169.4, probable dilation left ventricle and left atrium, linear density lingula and left lower lobe consistent with scar or atelectasis  2/8/21 chol 143,trig 151,hdl 41,ldl 72 on lipitor 80 mg  3/16/21 stress echo negative for ischemia, appropriate augmentation LV function after maximal exercise, EF 60%  4/13/21 ultrasound carotid <50% bilateral stenosis  2/21/23 chol 155,trig 120,hdl 50,ldl 81 on lipitor 80 mg and zetia 10 mg     Dyslipidemia  2/09 chol 204,trig 130, hdl 62, ldl 116  5/10 chol 142,trig 63,hdl 64,ldl 65  6/11 chol 120,trig 65,hdl 68,ldl 39 on mevacor and niaspan  1/12 chol 98,trig 55,hdl 40,ldl 47 on mevacor and niaspan; ok to stop niaspan  2/13 chol 172,trig 314,hdl 45,ldl 64 on mevacor 40 mg; start fish oil 2 bid   3/15/14 chol 198,trig 101,hdl 54,ldl 124 on mevacor 40 mg and fish oil  7/29/14 dietary evaluation and education  3/25/15 chol 175,trig 107,hdl 54,ldl 100 on mevacor 40 mg  5/17/16 chol 196,trig 105,hdl 52,ldl 123 on mevacor 40 mg  9/5/17 chol 206,trig 139,hdl 48,ldl 130 on mevacor 40 mg  9/8/17 change to lipitor 20 mg and repeat labs in 4 weeks  11/1/17 chol 179,trig 168,hdl 44,ldl 101 on lipitor 20  mg  10/19/18 chol 188,trig 170,hdl 47,ldl 107 on lipitor 20 mg  6/11/20 chol 181,trig 137,hdl 49,ldl 105 on lipitor 20 mg  10/12/20 CT thorax atherosclerosis with coronary calcification  11/12/20 CT coronary calcium score: LMA 0.0, LCx 211.5, .3, .5, .2 = 1169.4, probable dilation left ventricle and left atrium, linear density lingula and left lower lobe consistent with scar or atelectasis  11/12/20 increase lipitor to 80 mg repeat labs 4 weeks, stress echo ordered  2/8/21 chol 143,trig 151,hdl 41,ldl 72 on lipitor 80 mg  3/16/21 stress echo negative for ischemia, appropriate augmentation LV function after maximal exercise, EF 60%  4/13/21 ultrasound carotid <50% bilateral stenosis  2/10/22 chol 174,trig 110,hdl 50,ldl 102 on lipitor 80 mg  2/22/22 start zetia 10 mg, continue on lipitor 80 mg  5/26/22 chol 149,trig 106,hdl 51,ldl 77 on lipitor 80 mg and zetia 10 mg   2/21/23 chol 155,trig 120,hdl 50,ldl 81 on lipitor 80 mg and zetia 10 mg     cough  10/11/18 cough chronic in nature, question allergic rhinitis although treatment ineffective for preventing cough, tried prilosec over-the-counter no benefit  10/11/18 cxr negative  11/8/18 PFT FEV1 1.8 (87% predicted), FVC 2.5 (91% predicted), FEV/FVC 74%, no bronchodilator response, DLCO 120% predicted, normal pulmonary function testing   11/12/18 symptoms somewhat improved on prilosec 20 mg increased to 40 mg  1/6/20 trial protonix, CT high resolution ordered, referral allergy  6/17/20 CT high-resolution thorax no evidence of interstitial thickening or honeycombing, scattered cystic lucencies, consideration lymphocytic interstitial pneumonitis, emphysema, lymphangiomyomatosis, focal groundglass opacities upper lobes, coronary calcifications  7/8/20 pulmonary note start spiriva, repeat CT in 3 months  10/12/20 CT thorax stable 5 to 6 mm biapical groundglass nodules, no new nodules hyperinflated lungs, atherosclerosis with coronary  calcification  10/28/20  allergy note cough, proceed with aeroallergens skin testing, continue flonase, start sinus saline rinse, consider ENT or GI referral if allergy testing is negative, consider trial of singulair or atrovent nasal spray  11/3/20 pulmonary note chronic cough, off spiriva, suspect GERD, consider ENT assessment vocal cords, follow-up 6 months  2/16/21  allergy note cough, positive aeroallergens skin test on 11/3/20 positive to trees, grasses, weeds, one mold, cat, guinea pig, horse, rabbit and dust mite, continue xyxal, astelin, atrovent nasal, flonase, trial of stopping montelukast to see if fatigue improves, possible ENT evaluation chronic sinusitis, continue pantoprazole  3/10/21  ENT note flexible laryngoscopy, right nasal septal deviation, interarytenoid edema, cough is better with aggressive nasal regimen and PPI, follow-up GI for dysphagia, will obtain CT sinus, continue rinses bid, PPI before dinner, stop astelin  4/13/21 CT maxillofacial without plus reconstruction, no evidence of sinus disease, convex left nasal septal deviation  4/29/21 [facility] note previous aeroallergens skin test positive to trees, grasses, weeds, mold, cat, guinea pig, horse, rabbit and dust mite, cough is improved with ipratropium and astelin, xyzal, pantoprazole, off montelukast, with improvement in symptoms patient would like to hold off on immunotherapy, continue xyzal consider adding allegra 180 mg in am, follow-up ENT chronic sinusitis   5/19/21 [facility] note chronic cough, suspect more allergy related given improvement after allergy sprays, although she did start PPI at the same time, also has had esophageal dysphagia on occasion with solid foods, will schedule EGD for evaluation  5/27/11 CT thorax without, stable less than 6 mm biapical groundglass nodules, emphysematous changes lungs  7/2/21 pulmonary note symptoms improved with singulair but she was not able to tolerate this  medication, she also does see allergy and will continue with intranasal therapy, repeat CT thorax in 1 year, follow-up 6 months  8/25/21 EGD per [facility] noted schatzki's ring 18 mm balloon dilation, small hiatal hernia  8/27/21  allergy note cough chronic, has had work-up by pulmonary previously, number of positive testing on arrival allergy skin test, patient declines immunotherapy, continue xyzal 5 mg, flonase, astelin and atrovent nasal, did not tolerate singulair, and can add allegra 180 mg   12/7/21 stop protonix  3/10/22  allergy note on flonase, astelin, atrovent nasal, patient had dry cough for 10 to 20 years, has seen pulmonary previously, with aeroallergens skin test 11/3/20 positive for trees, grasses, weeds, mold, cat, guinea pig, horse, rabbit and dust mite, continue allegra 180 mg am, continue flonase, recommend trial of over-the-counter aasalcrom 1-2 times per day, increase astelin to twice daily, and can increase ipratropium to 3 times daily, cannot tolerate montelukast, patient declines food skin testing  6/23/22  allergy note patient declines immunotherapy and food skin testing,continue allegra,flonase,atrovent nasal,increase astelin to 2 sprays daily,use nasalcrom and if no improvement stop nasalcrom,continue protonix 20 mg, did not tolerate singulair  7/2/22 CT thorax without, stable biapical 5 to 6 mm groundglass lung nodules, underlying emphysema  11/10/22  allergy note positive aeroallergens skin testing but patient is not interested in immunotherapy, recommend increasing allegra to 180 mg bid for 2 weeks to see if cough improves, start atrovent nasal spray 2 to 3 times per day, continue flonase, nasalcrom, astelin, could not tolerate montelukast, continue pantoprazole  11/10/22  pulmonary note chronic cough, presumed allergic, controlled at present, pulmonary nodule stable x2 years no indication for continued surveillance  2/23/23 b12 1326,vit d  58,magnesium 2.0 on protonix  5/4/23  allergy note patient not interested in aero allergen immunotherapy, cannot tolerate singulair, consider trial of allegra 180 mg bid for 7 to 10 days to see if benefits cough, continue atrovent nasal,flonase,nasalcrom,astelin, protonix 20 mg  8/17/23 chest x-ray negative    Allergic rhinitis  10/28/20  allergy note cough, proceed with aeroallergens skin testing, continue flonase, start sinus saline rinse, consider ENT or GI referral if allergy testing is negative, consider trial of singulair or atrovent nasal spray  11/3/20  allergy note, aeroallergy skin test positive to trees, grasses, weeds, mold, cat, guinea pig, horse, rabbit, dust mite, recommend addition of astelin nasal spray to montelukast  2/16/21  allergy note cough, positive aeroallergens skin test on 11/3/20 positive to trees, grasses, weeds, one mold, cat, guinea pig, horse, rabbit and dust mite, continue xyxal, astelin, atrovent nasal, flonase, trial of stopping montelukast to see if fatigue improves, possible ENT evaluation chronic sinusitis, continue pantoprazole  3/10/21  ENT note flexible laryngoscopy, right nasal septal deviation, interarytenoid edema, cough is better with aggressive nasal regimen and PPI, follow-up GI for dysphagia, will obtain CT sinus, continue rinses bid, PPI before dinner, stop astelin  4/23/21 CT maxillofacial without plus reconstruction, no evidence of sinus disease, convex left nasal septal deviation  4/29/21 [facility] note previous aeroallergens skin test positive to trees, grasses, weeds, mold, cat, guinea pig, horse, rabbit and dust mite, cough is improved with ipratropium and astelin, xyzal, pantoprazole, off montelukast, with improvement in symptoms patient would like to hold off on immunotherapy, continue xyzal consider adding allegra 180 mg in am, follow-up ENT chronic sinusitis  5/19/21 [facility] note chronic cough, suspect more allergy  related given improvement after allergy sprays, although she did start PPI at the same time, also has had esophageal dysphagia on occasion with solid foods, will schedule EGD for evaluation  8/27/21  allergy note cough chronic, has had work-up by pulmonary previously, number of positive testing on arrival allergy skin test, patient declines immunotherapy, continue xyzal 5 mg, flonase, astelin and atrovent nasal, did not tolerate singulair, and can add allegra 180 mg    11/30/21  allergy aeroallergens skin test positive to trees, grasses, weeds, mold, cat, guinea pig, horse, rabbit, dust mite  12/7/21  allergy no cough seen by pulmonary, tried a no steroid no benefit, offered immunotherapy patient declines, continue allegra, flonase, astelin, atrovent nasal, protonix, cannot tolerate singulair, follow-up ENT, pulmonary, can try over-the-counter nasalcrom  3/10/22  allergy note on flonase, astelin, atrovent nasal, patient had dry cough for 10 to 20 years, has seen pulmonary previously, with aeroallergens skin test 11/3/20 positive for trees, grasses, weeds, mold, cat, guinea pig, horse, rabbit and dust mite, continue allegra 180 mg am, continue flonase, recommend trial of over-the-counter aasalcrom 1-2 times per day, increase astelin to twice daily, and can increase ipratropium to 3 times daily, cannot tolerate montelukast, patient declines food skin testing  6/23/22  allergy note patient declines immunotherapy and food skin testing,continue allegra,flonase,atrovent nasal,increase astelin to 2 sprays daily,use nasalcrom and if no improvement stop nasalcrom,continue protonix 20 mg, did not tolerate singulair  11/10/22  allergy note positive aeroallergens skin testing but patient is not interested in immunotherapy, recommend increasing allegra to 180 mg bid for 2 weeks to see if cough improves, start atrovent nasal spray 2 to 3 times per day, continue flonase, nasalcrom,  astelin, could not tolerate montelukast, continue pantoprazole  5/4/23  allergy note patient not interested in aero allergen immunotherapy, cannot tolerate singulair, consider trial of allegra 180 mg bid for 7 to 10 days to see if benefits cough, continue atrovent nasal,flonase,nasalcrom,astelin, protonix 20 mg  xyzal cause drowsiness    adenoma  2004 no records path unknown  4/30/12 [facility] colon tubular adenoma, repeat 5 yrs  10/20/17 colon per [facility] 5 polyps pathology 4 polyps adenomatous and benign polypoid tissue, severe diverticulosis, recommend repeat colonoscopy 3 years  8/25/21 colonoscopy per [facility] three adenomas, repeat in 3 years    Patient Active Problem List   Diagnosis    History of thyroid cancer    Dyslipidemia    History of depression    Adenoma of colon    Preventative health care    Osteopenia    Ocular migraine    Cough    History of compression fracture of spine    History of HSV infection    Microscopic hematuria    Impaired glucose metabolism    Skin lesion    Elevated coronary artery calcium score    Pulmonary nodules benign    Allergic rhinitis    Schatzki's ring    Sensorineural hearing loss    History of COVID-19    Knee arthritis          Depression Screening  Little interest or pleasure in doing things?  0 - not at all  Feeling down, depressed , or hopeless? 0 - not at all  Patient Health Questionnaire Score: 0     If depressive symptoms identified deferred to follow up visit unless specifically addressed in assessment and plan.    Interpretation of PHQ-9 Total Score   Score Severity   1-4 No Depression   5-9 Mild Depression   10-14 Moderate Depression   15-19 Moderately Severe Depression   20-27 Severe Depression    Screening for Cognitive Impairment  Do you or any of your friends or family members have any concern about your memory? Yes  Three Minute Recall (Banana, Sunrise, Chair) 3/3    Augusto clock face with all 12 numbers and set the hands to show 20 past 8.  Yes    Cognitive  concerns identified deferred for follow up unless specifically addressed in assessment and plan.    Fall Risk Assessment  Has the patient had two or more falls in the last year or any fall with injury in the last year?  No    Safety Assessment  Do you always wear your seatbelt?  Yes  Any changes to home needed to function safely? No  Difficulty hearing.  Yes  Patient counseled about all safety risks that were identified.    Functional Assessment ADLs  Are there any barriers preventing you from cooking for yourself or meeting nutritional needs?  No.    Are there any barriers preventing you from driving safely or obtaining transportation?  No.    Are there any barriers preventing you from using a telephone or calling for help?  No    Are there any barriers preventing you from shopping?  No.    Are there any barriers preventing you from taking care of your own finances?  No    Are there any barriers preventing you from managing your medications?  No    Are there any barriers preventing you from showering, bathing or dressing yourself? No    Are there any barriers preventing you from doing housework or laundry? No  Are there any barriers preventing you from using the toilet?No  Are you currently engaging in any exercise or physical activity?  No.      Self-Assessment of Health  What is your perception of your health? Good  Do you sleep more than six hours a night? Yes  In the past 7 days, how much did pain keep you from doing your normal work? Some  Do you spend quality time with family or friends (virtually or in person)? Yes  Do you usually eat a heart healthy diet that constists of a variety of fruits, vegetables, whole grains and fiber? No  Do you eat foods high in fat and/or Fast Food more than three times per week? No    Advance Care Planning  Do you have an Advance Directive, Living Will, Durable Power of , or POLST? Yes  Advance Directive       is on file            Patient Care Team:  Asael Redd  M.D. as PCP - General  Jeimy Huntley M.D. as Consulting Physician (OB & Gyn - Gynecology)  Carolyn Ventura M.D. as Consulting Physician (Endocrinology)  Mejia Saez O.D. as Consulting Physician (Optometry)  Charu Spann M.D. as Consulting Physician (Dermatology)  Jose Alejandro Godinez M.D. as Consulting Physician (Ophthalmology)  Viraj Rodriguez M.D. as Consulting Physician (Ophthalmology)  Fred Fuentes M.D. as Consulting Physician (Ophthalmology)                 ROS:    Ostomy or other tubes or amputations no  Chronic oxygen use no     Gait: normal. Uses assistive device :  no       Health Care Screening recommendations reviewed with patient today and updated or ordered.  DPA/Advanced directive: Completed/Information provided.   Referrals for PT/OT/Nutrition counseling/Behavioral Health/Smoking cessation as above if indicated  Discussion today about general wellness and lifestyle habits:    Prevent falls and reduce trip hazards;   Have a working fire alarm and carbon monoxide detector;   Engage in regular physical activity and social activities;   Use sun protection when outdoors.       ROS           Objective          Physical Exam  Vitals and nursing note reviewed.   Constitutional:       Appearance: Normal appearance.   HENT:      Head: Normocephalic and atraumatic.      Right Ear: External ear normal.      Left Ear: External ear normal.   Eyes:      Conjunctiva/sclera: Conjunctivae normal.   Cardiovascular:      Rate and Rhythm: Normal rate and regular rhythm.      Heart sounds: Normal heart sounds.   Pulmonary:      Breath sounds: Normal breath sounds.   Abdominal:      General: There is no distension.   Musculoskeletal:         General: No swelling.      Cervical back: Neck supple.   Skin:     Coloration: Skin is not jaundiced.   Neurological:      General: No focal deficit present.      Mental Status: She is alert.   Psychiatric:         Mood and Affect: Mood normal.         Behavior:  Behavior normal.       No spinal tenderness, normal strength lower extremities, no edema                      Assessment & Plan        Assessment  #1 Medicare wellness assessment    #2 history of thyroid cancer followed by  endocrinology most recent labs on Synthroid 75 mcg daily 10/11/23 tsh 0.98,free t4 1.46,thyroglobulin<0.1    #3 dyslipidemia on Lipitor and Zetia 2/21/23 chol 155,trig 120,hdl 50,ldl 81 on lipitor 80 mg and zetia 10 mg     #4 elevated CT calcium score 116 9 November 2020, stress echocardiogram 2 years ago negative asymptomatic with exercise, no chest pain with activity    #4 allergic rhinitis followed by  allergy    #5 postmenopausal bone loss last bone density February 2023    #6 skin lesions followed by dermatology with history of basal cell, we do not have any records, she tries to dress for sun exposure, does not use sunscreen, with a hat during the summer    #7 sensorineural hearing loss last audiology evaluation a year ago    #8 depression major single episode full remission no medications, PHQ 0     #9 mid to low back pain no trauma, no radiation, question arthritis    #10 history of Schatzki's ring, on pantoprazole, chronic PPI use can contribute to vitamin D, B12, magnesium deficiency    #10 postmenopausal bone loss no osteoporosis 2/14/23 dexa LS-1.5,hip-2.0    #11 urinary urgency, no dysuria, no hematuria, occasional stress incontinence, question overactive bladder    #12 impaired glucose metabolism last A1c 6%, trying to watch her nutrition program to get regular activity    Plan  #1 health maintenance reviewed and updated    #2 old records  allergy and  endocinre    #3 old [facility] basal cell, recommend she use a sunscreen and hat outdoors with sun exposure    #4 Labs including vitamin levels, B12, magnesium, vitamin D with PPI use, also urinalysis, if urinalysis negative consider overactive bladder medication    #5  x-ray thoracic and lumbar spine    #6 mammogram screening    #7 repeat bone density next year    #8 next colonoscopy due 8/24 colon due [facility]     #9 physical therapy referral for back pain [facility]    #10 try to increase her activity 30 minutes daily, also work on nutrition limiting sweets, candies, processed foods

## 2024-02-15 NOTE — LETTER
[employer] [facility]  Asael Redd M.D.  [address]  [place] [address]  Fax: [phone]   Authorization for Release/Disclosure of   Protected Health Information   Name: LEONILA MAYS : 1945 SSN: [id number]   Address: [facility] [place] [address] Phone:    [phone] (home)    I authorize the entity listed below to release/disclose the PHI below to:   [facility]/Asael Redd M.D. and Asael Redd M.D.   Provider or Entity Name:                                                   [facility]   Address   City, [facility]   Phone:      Fax:               [phone]   Reason for request: continuity of care   Information to be released: ALL OLD RECORDS   [  ] LAST COLONOSCOPY,  including any PATH REPORT and follow-up  [  ] LAST FIT/COLOGUARD RESULT [  ] LAST DEXA  [  ] LAST MAMMOGRAM  [  ] LAST PAP  [  ] LAST LABS [  ] RETINA EXAM REPORT  [  ] IMMUNIZATION RECORDS  [ x ] Release all info      [  ] Check here and initial the line next to each item to release ALL health information INCLUDING  _____ Care and treatment for drug and / or alcohol abuse  _____ HIV testing, infection status, or AIDS  _____ Genetic Testing    DATES OF SERVICE OR TIME PERIOD TO BE DISCLOSED: _____________  I understand and acknowledge that:  * This Authorization may be revoked at any time by you in writing, except if your health information has already been used or disclosed.  * Your health information that will be used or disclosed as a result of you signing this authorization could be re-disclosed by the recipient. If this occurs, your re-disclosed health information may no longer be protected by State or Federal laws.  * You may refuse to sign this Authorization. Your refusal will not affect your ability to obtain treatment.  * This Authorization becomes effective upon signing and will  on (date) __________.      If no date is indicated, this Authorization will  one (1) year from the  signature date.    Name: Ping Das  Signature:                  Continuity of Care Date:   2/22/2024     PLEASE FAX REQUESTED RECORDS BACK TO: [phone]

## 2024-02-15 NOTE — LETTER
[facility]  Asael Redd M.D.  [address]  [place] [address]  Fax: [phone]   Authorization for Release/Disclosure of   Protected Health Information   Name: LEONILA MAYS : 1945 SSN: [id number]   Address: [facility] [place] [address] Phone:    [phone] (home)    I authorize the entity listed below to release/disclose the PHI below to:   [facility]/Asael Redd M.D. and Asael Redd M.D.   Provider or Entity Name:                                                      Dr Ventura ([facility])   Address   City, [facility]   Phone:      Fax:             [phone]   Reason for request: continuity of care   Information to be released:  Notes   [  ] LAST COLONOSCOPY,  including any PATH REPORT and follow-up  [  ] LAST FIT/COLOGUARD RESULT [  ] LAST DEXA  [  ] LAST MAMMOGRAM  [  ] LAST PAP  [  ] LAST LABS [  ] RETINA EXAM REPORT  [  ] IMMUNIZATION RECORDS  [ x ] Release all info      [  ] Check here and initial the line next to each item to release ALL health information INCLUDING  _____ Care and treatment for drug and / or alcohol abuse  _____ HIV testing, infection status, or AIDS  _____ Genetic Testing    DATES OF SERVICE OR TIME PERIOD TO BE DISCLOSED: _____________  I understand and acknowledge that:  * This Authorization may be revoked at any time by you in writing, except if your health information has already been used or disclosed.  * Your health information that will be used or disclosed as a result of you signing this authorization could be re-disclosed by the recipient. If this occurs, your re-disclosed health information may no longer be protected by State or Federal laws.  * You may refuse to sign this Authorization. Your refusal will not affect your ability to obtain treatment.  * This Authorization becomes effective upon signing and will  on (date) __________.      If no date is indicated, this Authorization will  one (1) year from the  signature date.    Name: Ping Das  Signature:               Continuity of Care Date:   2/22/2024     PLEASE FAX REQUESTED RECORDS BACK TO: [phone]

## 2024-02-15 NOTE — TELEPHONE ENCOUNTER
Need old records  (1)  endocrine for most recent visit in November  (2)  allergy from most recent visit in November  (3) skin cancer dermatology [place] need all records

## 2024-02-15 NOTE — LETTER
[facility]  Asael Redd M.D.  [address]  [place] [address]  Fax: [phone]   Authorization for Release/Disclosure of   Protected Health Information   Name: LEONILA MAYS : 1945 SSN: [id number]   Address: [facility] [place] [address] Phone:    [phone] (home)    I authorize the entity listed below to release/disclose the PHI below to:   [facility]/Asael Redd M.D. and Asael Redd M.D.   Provider or Entity Name:                                               Dr Ramesh (Allergy)   Address   City, [facility]   Phone:      Fax:             [phone]   Reason for request: continuity of care   Information to be released: OV Notes from November   [  ] LAST COLONOSCOPY,  including any PATH REPORT and follow-up  [  ] LAST FIT/COLOGUARD RESULT [  ] LAST DEXA  [  ] LAST MAMMOGRAM  [  ] LAST PAP  [  ] LAST LABS [  ] RETINA EXAM REPORT  [  ] IMMUNIZATION RECORDS  [ x ] Release all info      [  ] Check here and initial the line next to each item to release ALL health information INCLUDING  _____ Care and treatment for drug and / or alcohol abuse  _____ HIV testing, infection status, or AIDS  _____ Genetic Testing    DATES OF SERVICE OR TIME PERIOD TO BE DISCLOSED: _____________  I understand and acknowledge that:  * This Authorization may be revoked at any time by you in writing, except if your health information has already been used or disclosed.  * Your health information that will be used or disclosed as a result of you signing this authorization could be re-disclosed by the recipient. If this occurs, your re-disclosed health information may no longer be protected by State or Federal laws.  * You may refuse to sign this Authorization. Your refusal will not affect your ability to obtain treatment.  * This Authorization becomes effective upon signing and will  on (date) __________.      If no date is indicated, this Authorization will  one (1) year from the  signature date.    Name: Ping Das  Signature:            Continuity of Care Date:   2/22/2024     PLEASE FAX REQUESTED RECORDS BACK TO: [phone]

## 2024-02-22 ENCOUNTER — HOSPITAL ENCOUNTER (OUTPATIENT)
Dept: RADIOLOGY | Facility: MEDICAL CENTER | Age: 79
End: 2024-02-22
Attending: INTERNAL MEDICINE
Payer: MEDICARE

## 2024-02-22 ENCOUNTER — TELEPHONE (OUTPATIENT)
Dept: MEDICAL GROUP | Facility: MEDICAL CENTER | Age: 79
End: 2024-02-22
Payer: MEDICARE

## 2024-02-22 DIAGNOSIS — M54.6 CHRONIC BILATERAL THORACIC BACK PAIN: ICD-10-CM

## 2024-02-22 DIAGNOSIS — G89.29 CHRONIC BILATERAL THORACIC BACK PAIN: ICD-10-CM

## 2024-02-22 DIAGNOSIS — M54.50 LOW BACK PAIN, UNSPECIFIED BACK PAIN LATERALITY, UNSPECIFIED CHRONICITY, UNSPECIFIED WHETHER SCIATICA PRESENT: ICD-10-CM

## 2024-02-22 DIAGNOSIS — Z12.31 ENCOUNTER FOR SCREENING MAMMOGRAM FOR BREAST CANCER: ICD-10-CM

## 2024-02-22 PROCEDURE — 72100 X-RAY EXAM L-S SPINE 2/3 VWS: CPT

## 2024-02-22 PROCEDURE — 77067 SCR MAMMO BI INCL CAD: CPT

## 2024-02-22 PROCEDURE — 72070 X-RAY EXAM THORAC SPINE 2VWS: CPT

## 2024-02-23 ENCOUNTER — TELEPHONE (OUTPATIENT)
Dept: MEDICAL GROUP | Facility: MEDICAL CENTER | Age: 79
End: 2024-02-23
Payer: MEDICARE

## 2024-02-24 NOTE — TELEPHONE ENCOUNTER
Please notify the patient that:  (1) her mammogram is negative but does show dense breast tissue which may decrease the accuracy of the mammogram  (2) she may obtain a screening breast ultrasound which could provide further detail  (3) [facility] offers the screening breast ultrasound, however some insurance plans may not cover the screening breast ultrasound  (4) if the screening breast ultrasound is not covered, typically the out-of-pocket expense is approximately $125  (5) if she would like me to order the screening breast ultrasound let me know, and I can submit that order to [facility], otherwise we can repeat the screening mammogram in 1 year

## 2024-02-26 ENCOUNTER — TELEPHONE (OUTPATIENT)
Dept: MEDICAL GROUP | Facility: MEDICAL CENTER | Age: 79
End: 2024-02-26
Payer: MEDICARE

## 2024-02-26 DIAGNOSIS — R92.30 DENSE BREAST TISSUE ON MAMMOGRAM, UNSPECIFIED TYPE: ICD-10-CM

## 2024-02-26 NOTE — TELEPHONE ENCOUNTER
----- Message from Asael Redd M.D. sent at 2/23/2024  4:45 PM PST -----  Please notify the patient that:  (1) her mammogram is negative but does show dense breast tissue which may decrease the accuracy of the mammogram  (2) she may obtain a screening breast ultrasound which could provide further detail  (3) [facility] offers the screening breast ultrasound, however some insurance plans may not cover the screening breast ultrasound  (4) if the screening breast ultrasound is not covered, typically the out-of-pocket expense is approximately $125  (5) if she would like me to order the screening breast ultrasound let me know, and I can submit that order to [facility], otherwise we can repeat the screening mammogram in 1 year

## 2024-03-02 DIAGNOSIS — E78.5 DYSLIPIDEMIA: Chronic | ICD-10-CM

## 2024-03-03 RX ORDER — POTASSIUM CHLORIDE 750 MG/1
10 TABLET, FILM COATED, EXTENDED RELEASE ORAL DAILY
Qty: 90 TABLET | Refills: 0 | Status: SHIPPED | OUTPATIENT
Start: 2024-03-03

## 2024-03-03 RX ORDER — ATORVASTATIN CALCIUM 80 MG/1
80 TABLET, FILM COATED ORAL DAILY
Qty: 90 TABLET | Refills: 0 | Status: SHIPPED | OUTPATIENT
Start: 2024-03-03

## 2024-03-05 ENCOUNTER — HOSPITAL ENCOUNTER (OUTPATIENT)
Dept: LAB | Facility: MEDICAL CENTER | Age: 79
End: 2024-03-05
Attending: INTERNAL MEDICINE
Payer: MEDICARE

## 2024-03-05 ENCOUNTER — TELEPHONE (OUTPATIENT)
Dept: MEDICAL GROUP | Facility: MEDICAL CENTER | Age: 79
End: 2024-03-05
Payer: MEDICARE

## 2024-03-05 DIAGNOSIS — E78.5 DYSLIPIDEMIA: ICD-10-CM

## 2024-03-05 DIAGNOSIS — R79.0 LOW MAGNESIUM LEVEL: ICD-10-CM

## 2024-03-05 DIAGNOSIS — R73.09 IMPAIRED GLUCOSE METABOLISM: ICD-10-CM

## 2024-03-05 DIAGNOSIS — E53.8 B12 DEFICIENCY: ICD-10-CM

## 2024-03-05 DIAGNOSIS — R35.1 NOCTURIA: ICD-10-CM

## 2024-03-05 DIAGNOSIS — E55.9 VITAMIN D DEFICIENCY: ICD-10-CM

## 2024-03-05 PROBLEM — F32.5 MAJOR DEPRESSIVE DISORDER, SINGLE EPISODE, IN FULL REMISSION (HCC): Status: RESOLVED | Noted: 2024-02-15 | Resolved: 2024-03-05

## 2024-03-05 LAB
25(OH)D3 SERPL-MCNC: 58 NG/ML (ref 30–100)
ALBUMIN SERPL BCP-MCNC: 4.1 G/DL (ref 3.2–4.9)
ALBUMIN/GLOB SERPL: 1.5 G/DL
ALP SERPL-CCNC: 99 U/L (ref 30–99)
ALT SERPL-CCNC: 9 U/L (ref 2–50)
ANION GAP SERPL CALC-SCNC: 13 MMOL/L (ref 7–16)
APPEARANCE UR: CLEAR
AST SERPL-CCNC: 19 U/L (ref 12–45)
BACTERIA #/AREA URNS HPF: NEGATIVE /HPF
BASOPHILS # BLD AUTO: 0.6 % (ref 0–1.8)
BASOPHILS # BLD: 0.04 K/UL (ref 0–0.12)
BILIRUB SERPL-MCNC: 0.8 MG/DL (ref 0.1–1.5)
BILIRUB UR QL STRIP.AUTO: NEGATIVE
BUN SERPL-MCNC: 13 MG/DL (ref 8–22)
CALCIUM ALBUM COR SERPL-MCNC: 9.4 MG/DL (ref 8.5–10.5)
CALCIUM SERPL-MCNC: 9.5 MG/DL (ref 8.5–10.5)
CHLORIDE SERPL-SCNC: 101 MMOL/L (ref 96–112)
CHOLEST SERPL-MCNC: 157 MG/DL (ref 100–199)
CO2 SERPL-SCNC: 26 MMOL/L (ref 20–33)
COLOR UR: YELLOW
CREAT SERPL-MCNC: 0.53 MG/DL (ref 0.5–1.4)
EOSINOPHIL # BLD AUTO: 0.13 K/UL (ref 0–0.51)
EOSINOPHIL NFR BLD: 1.9 % (ref 0–6.9)
EPI CELLS #/AREA URNS HPF: NORMAL /HPF
ERYTHROCYTE [DISTWIDTH] IN BLOOD BY AUTOMATED COUNT: 42.7 FL (ref 35.9–50)
EST. AVERAGE GLUCOSE BLD GHB EST-MCNC: 123 MG/DL
FASTING STATUS PATIENT QL REPORTED: NORMAL
GFR SERPLBLD CREATININE-BSD FMLA CKD-EPI: 94 ML/MIN/1.73 M 2
GLOBULIN SER CALC-MCNC: 2.8 G/DL (ref 1.9–3.5)
GLUCOSE SERPL-MCNC: 97 MG/DL (ref 65–99)
GLUCOSE UR STRIP.AUTO-MCNC: NEGATIVE MG/DL
HBA1C MFR BLD: 5.9 % (ref 4–5.6)
HCT VFR BLD AUTO: 41.7 % (ref 37–47)
HDLC SERPL-MCNC: 51 MG/DL
HGB BLD-MCNC: 14.6 G/DL (ref 12–16)
HYALINE CASTS #/AREA URNS LPF: NORMAL /LPF
IMM GRANULOCYTES # BLD AUTO: 0.01 K/UL (ref 0–0.11)
IMM GRANULOCYTES NFR BLD AUTO: 0.1 % (ref 0–0.9)
KETONES UR STRIP.AUTO-MCNC: NEGATIVE MG/DL
LDLC SERPL CALC-MCNC: 73 MG/DL
LEUKOCYTE ESTERASE UR QL STRIP.AUTO: ABNORMAL
LYMPHOCYTES # BLD AUTO: 1.73 K/UL (ref 1–4.8)
LYMPHOCYTES NFR BLD: 25 % (ref 22–41)
MAGNESIUM SERPL-MCNC: 2.1 MG/DL (ref 1.5–2.5)
MCH RBC QN AUTO: 31.7 PG (ref 27–33)
MCHC RBC AUTO-ENTMCNC: 35 G/DL (ref 32.2–35.5)
MCV RBC AUTO: 90.7 FL (ref 81.4–97.8)
MICRO URNS: ABNORMAL
MONOCYTES # BLD AUTO: 0.58 K/UL (ref 0–0.85)
MONOCYTES NFR BLD AUTO: 8.4 % (ref 0–13.4)
NEUTROPHILS # BLD AUTO: 4.43 K/UL (ref 1.82–7.42)
NEUTROPHILS NFR BLD: 64 % (ref 44–72)
NITRITE UR QL STRIP.AUTO: NEGATIVE
NRBC # BLD AUTO: 0 K/UL
NRBC BLD-RTO: 0 /100 WBC (ref 0–0.2)
PH UR STRIP.AUTO: 6.5 [PH] (ref 5–8)
PLATELET # BLD AUTO: 289 K/UL (ref 164–446)
PMV BLD AUTO: 10 FL (ref 9–12.9)
POTASSIUM SERPL-SCNC: 4 MMOL/L (ref 3.6–5.5)
PROT SERPL-MCNC: 6.9 G/DL (ref 6–8.2)
PROT UR QL STRIP: NEGATIVE MG/DL
RBC # BLD AUTO: 4.6 M/UL (ref 4.2–5.4)
RBC # URNS HPF: NORMAL /HPF
RBC UR QL AUTO: NEGATIVE
SODIUM SERPL-SCNC: 140 MMOL/L (ref 135–145)
SP GR UR STRIP.AUTO: 1.02
TRIGL SERPL-MCNC: 165 MG/DL (ref 0–149)
UROBILINOGEN UR STRIP.AUTO-MCNC: 0.2 MG/DL
VIT B12 SERPL-MCNC: 1453 PG/ML (ref 211–911)
WBC # BLD AUTO: 6.9 K/UL (ref 4.8–10.8)
WBC #/AREA URNS HPF: NORMAL /HPF

## 2024-03-05 PROCEDURE — 80053 COMPREHEN METABOLIC PANEL: CPT

## 2024-03-05 PROCEDURE — 85025 COMPLETE CBC W/AUTO DIFF WBC: CPT

## 2024-03-05 PROCEDURE — 82306 VITAMIN D 25 HYDROXY: CPT

## 2024-03-05 PROCEDURE — 82607 VITAMIN B-12: CPT

## 2024-03-05 PROCEDURE — 83735 ASSAY OF MAGNESIUM: CPT

## 2024-03-05 PROCEDURE — 83036 HEMOGLOBIN GLYCOSYLATED A1C: CPT | Mod: GA

## 2024-03-05 PROCEDURE — 81001 URINALYSIS AUTO W/SCOPE: CPT

## 2024-03-05 PROCEDURE — 36415 COLL VENOUS BLD VENIPUNCTURE: CPT

## 2024-03-05 PROCEDURE — 80061 LIPID PANEL: CPT

## 2024-03-06 NOTE — TELEPHONE ENCOUNTER
Notified with results, cholesterol stable, slightly elevated triglycerides, A1c improved, continue her good nutrition and activity program.  No medication changes.

## 2024-03-13 ENCOUNTER — APPOINTMENT (RX ONLY)
Dept: URBAN - METROPOLITAN AREA CLINIC 4 | Facility: CLINIC | Age: 79
Setting detail: DERMATOLOGY
End: 2024-03-13

## 2024-03-13 DIAGNOSIS — L82.1 OTHER SEBORRHEIC KERATOSIS: ICD-10-CM

## 2024-03-13 DIAGNOSIS — D22 MELANOCYTIC NEVI: ICD-10-CM

## 2024-03-13 DIAGNOSIS — L81.4 OTHER MELANIN HYPERPIGMENTATION: ICD-10-CM | Status: STABLE

## 2024-03-13 DIAGNOSIS — L90.5 SCAR CONDITIONS AND FIBROSIS OF SKIN: ICD-10-CM

## 2024-03-13 DIAGNOSIS — L57.0 ACTINIC KERATOSIS: ICD-10-CM

## 2024-03-13 DIAGNOSIS — Z85.828 PERSONAL HISTORY OF OTHER MALIGNANT NEOPLASM OF SKIN: ICD-10-CM

## 2024-03-13 PROBLEM — D22.5 MELANOCYTIC NEVI OF TRUNK: Status: ACTIVE | Noted: 2024-03-13

## 2024-03-13 PROBLEM — D48.5 NEOPLASM OF UNCERTAIN BEHAVIOR OF SKIN: Status: ACTIVE | Noted: 2024-03-13

## 2024-03-13 PROCEDURE — ? COUNSELING

## 2024-03-13 PROCEDURE — ? ADDITIONAL NOTES

## 2024-03-13 PROCEDURE — ? BIOPSY BY SHAVE METHOD

## 2024-03-13 PROCEDURE — 99213 OFFICE O/P EST LOW 20 MIN: CPT | Mod: 25

## 2024-03-13 PROCEDURE — ? PRESCRIPTION

## 2024-03-13 PROCEDURE — ? PHOTO-DOCUMENTATION

## 2024-03-13 PROCEDURE — ? DEFER

## 2024-03-13 PROCEDURE — 17000 DESTRUCT PREMALG LESION: CPT | Mod: 59

## 2024-03-13 PROCEDURE — ? LIQUID NITROGEN

## 2024-03-13 PROCEDURE — ? DIAGNOSIS COMMENT

## 2024-03-13 PROCEDURE — ? OBSERVATION AND MEASURE

## 2024-03-13 PROCEDURE — 11102 TANGNTL BX SKIN SINGLE LES: CPT

## 2024-03-13 RX ORDER — CLINDAMYCIN PHOSPHATE 10 MG/ML
1 LOTION TOPICAL BID
Qty: 60 | Refills: 0 | Status: ERX | COMMUNITY
Start: 2024-03-13

## 2024-03-13 RX ADMIN — CLINDAMYCIN PHOSPHATE 1: 10 LOTION TOPICAL at 00:00

## 2024-03-13 ASSESSMENT — LOCATION ZONE DERM
LOCATION ZONE: FACE
LOCATION ZONE: LEG
LOCATION ZONE: NECK
LOCATION ZONE: TRUNK

## 2024-03-13 ASSESSMENT — LOCATION DETAILED DESCRIPTION DERM
LOCATION DETAILED: RIGHT MEDIAL POSTERIOR ANKLE
LOCATION DETAILED: RIGHT CLAVICULAR SKIN
LOCATION DETAILED: SUPERIOR LUMBAR SPINE
LOCATION DETAILED: LEFT CENTRAL ZYGOMA
LOCATION DETAILED: RIGHT SUPERIOR UPPER BACK
LOCATION DETAILED: RIGHT ANTERIOR LATERAL DISTAL THIGH
LOCATION DETAILED: LEFT SUPERIOR UPPER BACK
LOCATION DETAILED: LEFT ANTERIOR DISTAL THIGH
LOCATION DETAILED: INFERIOR THORACIC SPINE
LOCATION DETAILED: RIGHT DISTAL PRETIBIAL REGION
LOCATION DETAILED: LEFT CENTRAL LATERAL NECK

## 2024-03-13 ASSESSMENT — LOCATION SIMPLE DESCRIPTION DERM
LOCATION SIMPLE: LOWER BACK
LOCATION SIMPLE: LEFT THIGH
LOCATION SIMPLE: LEFT ZYGOMA
LOCATION SIMPLE: RIGHT THIGH
LOCATION SIMPLE: RIGHT PRETIBIAL REGION
LOCATION SIMPLE: LEFT UPPER BACK
LOCATION SIMPLE: NECK
LOCATION SIMPLE: UPPER BACK
LOCATION SIMPLE: RIGHT UPPER BACK
LOCATION SIMPLE: RIGHT ANKLE
LOCATION SIMPLE: RIGHT CLAVICULAR SKIN

## 2024-03-13 NOTE — PROCEDURE: ADDITIONAL NOTES
Detail Level: Detailed
Additional Notes: The patient declines a biopsy today and elects to monitor it for now. We will re-check in 6-8 weeks. If not resolved with topical antibiotic therapy we may biopsy to r/o a skin cancer
Render Risk Assessment In Note?: no

## 2024-03-13 NOTE — PROCEDURE: DEFER
Size Of Lesion In Cm (Optional): 0
Introduction Text (Please End With A Colon): The following procedure was deferred: Pt is scheduled with Dr. Huizar for Mohs
Detail Level: Detailed

## 2024-03-13 NOTE — PROCEDURE: BIOPSY BY SHAVE METHOD
Detail Level: Detailed
Depth Of Biopsy: dermis
Was A Bandage Applied: Yes
Size Of Lesion In Cm: 0.6
X Size Of Lesion In Cm: 0
Biopsy Type: H and E
Biopsy Method: Personna blade
Anesthesia Type: 1% lidocaine without epinephrine and a 1:10 solution of 8.4% sodium bicarbonate
Anesthesia Volume In Cc: 1
Hemostasis: Aluminum Chloride
Wound Care: Petrolatum
Dressing: bandage
Destruction After The Procedure: No
Type Of Destruction Used: Cryotherapy
Curettage Text: The wound bed was treated with curettage after the biopsy was performed.
Cryotherapy Text: The wound bed was treated with cryotherapy after the biopsy was performed.
Electrodesiccation Text: The wound bed was treated with electrodesiccation after the biopsy was performed.
Electrodesiccation And Curettage Text: The wound bed was treated with electrodesiccation and curettage after the biopsy was performed.
Silver Nitrate Text: The wound bed was treated with silver nitrate after the biopsy was performed.
Lab: [id number]
Lab Facility: 
Consent: Written consent was obtained and risks were reviewed including but not limited to scarring, infection, bleeding, scabbing, incomplete removal, nerve damage and allergy to anesthesia.
Post-Care Instructions: I reviewed with the patient in detail post-care instructions. Patient is to keep the biopsy site dry overnight, and then apply bacitracin/petroleum twice daily until healed.
Notification Instructions: Patient will be notified of biopsy results. However, patient instructed to call the office if not contacted within 2 weeks.
Billing Type: Third-Party Bill
Information: Selecting Yes will display possible errors in your note based on the variables you have selected. This validation is only offered as a suggestion for you. PLEASE NOTE THAT THE VALIDATION TEXT WILL BE REMOVED WHEN YOU FINALIZE YOUR NOTE. IF YOU WANT TO FAX A PRELIMINARY NOTE YOU WILL NEED TO TOGGLE THIS TO 'NO' IF YOU DO NOT WANT IT IN YOUR FAXED NOTE.

## 2024-03-13 NOTE — PROCEDURE: DIAGNOSIS COMMENT
Detail Level: Detailed
Comment: See photo from 09/12/2018
Comment: [id number]
Render Risk Assessment In Note?: no
Comment: History of skin cancers
Comment: [id number], hx of Nodular BCC with clear margins at biopsy.

## 2024-03-13 NOTE — PROCEDURE: COUNSELING
Patient Specific Counseling (Will Not Stick From Patient To Patient): No changes noted when compared to the previously taken photo.
Detail Level: Simple
Detail Level: Detailed
Detail Level: Generalized

## 2024-03-19 ENCOUNTER — HOSPITAL ENCOUNTER (OUTPATIENT)
Dept: RADIOLOGY | Facility: MEDICAL CENTER | Age: 79
End: 2024-03-19
Attending: INTERNAL MEDICINE
Payer: MEDICARE

## 2024-03-19 DIAGNOSIS — R92.30 DENSE BREAST TISSUE ON MAMMOGRAM, UNSPECIFIED TYPE: ICD-10-CM

## 2024-03-19 PROCEDURE — 76641 ULTRASOUND BREAST COMPLETE: CPT

## 2024-04-07 RX ORDER — PANTOPRAZOLE SODIUM 20 MG/1
20 TABLET, DELAYED RELEASE ORAL DAILY
Qty: 180 TABLET | Refills: 3 | Status: SHIPPED | OUTPATIENT
Start: 2024-04-07

## 2024-04-12 DIAGNOSIS — J30.1 ALLERGIC RHINITIS DUE TO POLLEN, UNSPECIFIED SEASONALITY: ICD-10-CM

## 2024-04-12 RX ORDER — FLUTICASONE PROPIONATE 50 MCG
SPRAY, SUSPENSION (ML) NASAL
Qty: 48 G | Refills: 3 | Status: SHIPPED | OUTPATIENT
Start: 2024-04-12

## 2024-04-24 ENCOUNTER — HOSPITAL ENCOUNTER (OUTPATIENT)
Dept: LAB | Facility: MEDICAL CENTER | Age: 79
End: 2024-04-24
Attending: STUDENT IN AN ORGANIZED HEALTH CARE EDUCATION/TRAINING PROGRAM
Payer: MEDICARE

## 2024-04-24 PROCEDURE — 84439 ASSAY OF FREE THYROXINE: CPT

## 2024-04-24 PROCEDURE — 36415 COLL VENOUS BLD VENIPUNCTURE: CPT

## 2024-04-24 PROCEDURE — 84443 ASSAY THYROID STIM HORMONE: CPT

## 2024-04-25 LAB
T4 FREE SERPL-MCNC: 1.57 NG/DL (ref 0.93–1.7)
TSH SERPL DL<=0.005 MIU/L-ACNC: 1.55 UIU/ML (ref 0.38–5.33)

## 2024-05-29 ENCOUNTER — APPOINTMENT (RX ONLY)
Dept: URBAN - METROPOLITAN AREA CLINIC 4 | Facility: CLINIC | Age: 79
Setting detail: DERMATOLOGY
End: 2024-05-29

## 2024-05-29 DIAGNOSIS — D485 NEOPLASM OF UNCERTAIN BEHAVIOR OF SKIN: ICD-10-CM

## 2024-05-29 PROBLEM — C44.719 BASAL CELL CARCINOMA OF SKIN OF LEFT LOWER LIMB, INCLUDING HIP: Status: ACTIVE | Noted: 2024-05-29

## 2024-05-29 PROBLEM — D48.5 NEOPLASM OF UNCERTAIN BEHAVIOR OF SKIN: Status: ACTIVE | Noted: 2024-05-29

## 2024-05-29 PROCEDURE — 17262 DSTRJ MAL LES T/A/L 1.1-2.0: CPT

## 2024-05-29 PROCEDURE — ? CURETTAGE AND DESTRUCTION

## 2024-05-29 PROCEDURE — ? DIAGNOSIS COMMENT

## 2024-05-29 PROCEDURE — ? ADDITIONAL NOTES

## 2024-05-29 PROCEDURE — ? DEFER

## 2024-05-29 PROCEDURE — 99212 OFFICE O/P EST SF 10 MIN: CPT | Mod: 25

## 2024-05-29 PROCEDURE — ? COUNSELING

## 2024-05-29 ASSESSMENT — LOCATION SIMPLE DESCRIPTION DERM: LOCATION SIMPLE: NOSE

## 2024-05-29 ASSESSMENT — LOCATION DETAILED DESCRIPTION DERM: LOCATION DETAILED: NASAL DORSUM

## 2024-05-29 ASSESSMENT — LOCATION ZONE DERM: LOCATION ZONE: NOSE

## 2024-05-29 NOTE — PROCEDURE: CURETTAGE AND DESTRUCTION
Detail Level: Detailed
Biopsy Photograph Reviewed: Yes
Accession # (Optional): [id number]
Number Of Curettages: 3
Size Of Lesion In Cm: 0.7
Size Of Lesion After Curettage: 1.1
Add Intralesional Injection: No
Total Volume (Ccs): 1
Anesthesia Type: 1% lidocaine with 1:100,000 epinephrine and a 1:10 solution of 8.4% sodium bicarbonate
Cautery Type: electrodesiccation
What Was Performed First?: Curettage
Final Size Statement: The size of the lesion after curettage was
Additional Information: (Optional): The wound was cleaned, and a pressure dressing was applied.  The patient received detailed post-op instructions.
Consent was obtained from the patient. The risks, benefits and alternatives to therapy were discussed in detail. Specifically, the risks of infection, scarring, bleeding, prolonged wound healing, nerve injury, incomplete removal, allergy to anesthesia and recurrence were addressed. Alternatives to ED&C, such as: surgical removal and XRT were also discussed.  Prior to the procedure, the treatment site was clearly identified and confirmed by the patient. All components of Universal Protocol/PAUSE Rule completed.
Post-Care Instructions: I reviewed with the patient in detail post-care instructions. Patient is to keep the biopsy site dry overnight, and then apply bacitracin/petroleum twice daily until healed.
Bill As A Line Item Or As Units: Line Item

## 2024-05-29 NOTE — PROCEDURE: ADDITIONAL NOTES
Detail Level: Detailed
Additional Notes: Patient continues to decline biopsy.  Feels it is improving.  Looks slightly more like sebaceous hyperplasia rather than folliculitis today, but cannot rule out BCC.  Pt want to wait until September appointment to f/u.  Understands that it might grow in the interim if left undiagnosed.
Render Risk Assessment In Note?: no

## 2024-05-31 DIAGNOSIS — E78.5 DYSLIPIDEMIA: Chronic | ICD-10-CM

## 2024-05-31 RX ORDER — POTASSIUM CHLORIDE 750 MG/1
10 TABLET, FILM COATED, EXTENDED RELEASE ORAL DAILY
Qty: 90 TABLET | Refills: 2 | Status: SHIPPED | OUTPATIENT
Start: 2024-05-31

## 2024-05-31 RX ORDER — ATORVASTATIN CALCIUM 80 MG/1
80 TABLET, FILM COATED ORAL DAILY
Qty: 90 TABLET | Refills: 2 | Status: SHIPPED | OUTPATIENT
Start: 2024-05-31

## 2024-05-31 NOTE — TELEPHONE ENCOUNTER
Received request via: Pharmacy    Was the patient seen in the last year in this department? Yes    Does the patient have an active prescription (recently filled or refills available) for medication(s) requested? No    Pharmacy Name: [facility]     Does the patient have shelter Plus and need 100 day supply (blood pressure, diabetes and cholesterol meds only)? Patient does not have SCP

## 2024-08-31 RX ORDER — EZETIMIBE 10 MG/1
10 TABLET ORAL DAILY
Qty: 90 TABLET | Refills: 1 | Status: SHIPPED | OUTPATIENT
Start: 2024-08-31

## 2024-09-18 ENCOUNTER — APPOINTMENT (RX ONLY)
Dept: URBAN - METROPOLITAN AREA CLINIC 4 | Facility: CLINIC | Age: 79
Setting detail: DERMATOLOGY
End: 2024-09-18

## 2024-09-18 DIAGNOSIS — L57.0 ACTINIC KERATOSIS: ICD-10-CM

## 2024-09-18 DIAGNOSIS — L90.5 SCAR CONDITIONS AND FIBROSIS OF SKIN: ICD-10-CM

## 2024-09-18 DIAGNOSIS — D22 MELANOCYTIC NEVI: ICD-10-CM

## 2024-09-18 DIAGNOSIS — D485 NEOPLASM OF UNCERTAIN BEHAVIOR OF SKIN: ICD-10-CM | Status: STABLE

## 2024-09-18 DIAGNOSIS — Z85.828 PERSONAL HISTORY OF OTHER MALIGNANT NEOPLASM OF SKIN: ICD-10-CM

## 2024-09-18 DIAGNOSIS — L81.4 OTHER MELANIN HYPERPIGMENTATION: ICD-10-CM | Status: STABLE

## 2024-09-18 DIAGNOSIS — L20.89 OTHER ATOPIC DERMATITIS: ICD-10-CM

## 2024-09-18 DIAGNOSIS — L82.1 OTHER SEBORRHEIC KERATOSIS: ICD-10-CM

## 2024-09-18 DIAGNOSIS — L82.0 INFLAMED SEBORRHEIC KERATOSIS: ICD-10-CM

## 2024-09-18 PROBLEM — D22.5 MELANOCYTIC NEVI OF TRUNK: Status: ACTIVE | Noted: 2024-09-18

## 2024-09-18 PROBLEM — D48.5 NEOPLASM OF UNCERTAIN BEHAVIOR OF SKIN: Status: ACTIVE | Noted: 2024-09-18

## 2024-09-18 PROCEDURE — ? MEDICATION COUNSELING

## 2024-09-18 PROCEDURE — 99213 OFFICE O/P EST LOW 20 MIN: CPT | Mod: 25

## 2024-09-18 PROCEDURE — ? ADDITIONAL NOTES

## 2024-09-18 PROCEDURE — 17110 DESTRUCTION B9 LES UP TO 14: CPT

## 2024-09-18 PROCEDURE — ? COUNSELING

## 2024-09-18 PROCEDURE — ? TREATMENT REGIMEN

## 2024-09-18 PROCEDURE — ? PHOTO-DOCUMENTATION

## 2024-09-18 PROCEDURE — ? LIQUID NITROGEN

## 2024-09-18 PROCEDURE — ? DIAGNOSIS COMMENT

## 2024-09-18 PROCEDURE — ? OBSERVATION AND MEASURE

## 2024-09-18 PROCEDURE — ? PRESCRIPTION

## 2024-09-18 PROCEDURE — 17000 DESTRUCT PREMALG LESION: CPT | Mod: 59

## 2024-09-18 RX ORDER — TRIAMCINOLONE ACETONIDE 1 MG/G
CREAM TOPICAL BID
Qty: 80 | Refills: 1 | Status: ERX | COMMUNITY
Start: 2024-09-18

## 2024-09-18 RX ADMIN — TRIAMCINOLONE ACETONIDE: 1 CREAM TOPICAL at 00:00

## 2024-09-18 ASSESSMENT — LOCATION SIMPLE DESCRIPTION DERM
LOCATION SIMPLE: SUPERIOR FOREHEAD
LOCATION SIMPLE: LEFT UPPER BACK
LOCATION SIMPLE: RIGHT THIGH
LOCATION SIMPLE: RIGHT UPPER BACK
LOCATION SIMPLE: RIGHT ANKLE
LOCATION SIMPLE: LEFT THIGH
LOCATION SIMPLE: NECK
LOCATION SIMPLE: LEFT PRETIBIAL REGION
LOCATION SIMPLE: UPPER BACK
LOCATION SIMPLE: LEFT FOREARM
LOCATION SIMPLE: LEFT ZYGOMA
LOCATION SIMPLE: LOWER BACK
LOCATION SIMPLE: RIGHT HAND
LOCATION SIMPLE: CHEST
LOCATION SIMPLE: RIGHT FOREARM
LOCATION SIMPLE: LEFT HAND
LOCATION SIMPLE: RIGHT CLAVICULAR SKIN
LOCATION SIMPLE: NOSE
LOCATION SIMPLE: RIGHT PRETIBIAL REGION

## 2024-09-18 ASSESSMENT — LOCATION DETAILED DESCRIPTION DERM
LOCATION DETAILED: RIGHT RADIAL DORSAL HAND
LOCATION DETAILED: LEFT ULNAR DORSAL HAND
LOCATION DETAILED: RIGHT POSTERIOR ANKLE
LOCATION DETAILED: SUPERIOR LUMBAR SPINE
LOCATION DETAILED: RIGHT DISTAL PRETIBIAL REGION
LOCATION DETAILED: SUPERIOR MID FOREHEAD
LOCATION DETAILED: INFERIOR THORACIC SPINE
LOCATION DETAILED: RIGHT ANTERIOR LATERAL DISTAL THIGH
LOCATION DETAILED: RIGHT CLAVICULAR SKIN
LOCATION DETAILED: LEFT ANTERIOR PROXIMAL THIGH
LOCATION DETAILED: RIGHT SUPERIOR UPPER BACK
LOCATION DETAILED: RIGHT MEDIAL POSTERIOR ANKLE
LOCATION DETAILED: RIGHT PROXIMAL PRETIBIAL REGION
LOCATION DETAILED: RIGHT PROXIMAL DORSAL FOREARM
LOCATION DETAILED: MIDDLE STERNUM
LOCATION DETAILED: LEFT CENTRAL LATERAL NECK
LOCATION DETAILED: LEFT SUPERIOR UPPER BACK
LOCATION DETAILED: LEFT PROXIMAL DORSAL FOREARM
LOCATION DETAILED: LEFT PROXIMAL PRETIBIAL REGION
LOCATION DETAILED: RIGHT ANTERIOR PROXIMAL THIGH
LOCATION DETAILED: NASAL DORSUM
LOCATION DETAILED: LEFT CENTRAL ZYGOMA
LOCATION DETAILED: LEFT DISTAL DORSAL FOREARM

## 2024-09-18 ASSESSMENT — LOCATION ZONE DERM
LOCATION ZONE: NECK
LOCATION ZONE: NOSE
LOCATION ZONE: ARM
LOCATION ZONE: LEG
LOCATION ZONE: FACE
LOCATION ZONE: TRUNK
LOCATION ZONE: HAND

## 2024-09-18 ASSESSMENT — BSA ECZEMA: % BODY COVERED IN ECZEMA: 10

## 2024-09-18 ASSESSMENT — SEVERITY ASSESSMENT 2020: SEVERITY 2020: MILD

## 2024-09-18 ASSESSMENT — ITCH NUMERIC RATING SCALE: HOW SEVERE IS YOUR ITCHING?: 5

## 2024-09-18 NOTE — PROCEDURE: DIAGNOSIS COMMENT
Detail Level: Detailed
Render Risk Assessment In Note?: no
Comment: [id number], hx of Nodular BCC with clear margins at biopsy.
Comment: [id number]
Comment: History of skin cancers
Comment: See photo from 09/12/2018
Comment: [id number], hx of Nodular BCC. Treated 5/29/24

## 2024-09-18 NOTE — PROCEDURE: ADDITIONAL NOTES
Detail Level: Detailed
Additional Notes: Patient continues to decline biopsy.  Feels it is improving.  Looks slightly more like sebaceous hyperplasia rather than folliculitis today, but cannot rule out BCC.  Pt want to wait until September appointment to f/u.  Understands that it might grow in the interim if left undiagnosed.
Render Risk Assessment In Note?: no
Additional Notes: The patient continues to decline treatment. She elects to continue to apply Clindamycin lotion and monitor the lesion.

## 2024-09-18 NOTE — PROCEDURE: LIQUID NITROGEN
Consent: The patient's consent was obtained including but not limited to risks of crusting, scabbing, blistering, scarring, darker or lighter pigmentary change, recurrence, incomplete removal and infection.
Medical Necessity Clause: This procedure was medically necessary because the lesions that were treated were:
Render Note In Bullet Format When Appropriate: No
Include Z78.9 (Other Specified Conditions Influencing Health Status) As An Associated Diagnosis?: Yes
Spray Paint Text: The liquid nitrogen was applied to the skin utilizing a spray paint frosting technique.
Post-Care Instructions: I reviewed with the patient in detail post-care instructions. Patient is to wear sunprotection, and avoid picking at any of the treated lesions. Pt may apply Vaseline to crusted or scabbing areas.
Detail Level: Detailed
Medical Necessity Information: It is in your best interest to select a reason for this procedure from the list below. All of these items fulfill various CMS LCD requirements except the new and changing color options.
Duration Of Freeze Thaw-Cycle (Seconds): 0

## 2024-09-18 NOTE — PROCEDURE: COUNSELING
Detail Level: Detailed
Patient Specific Counseling (Will Not Stick From Patient To Patient): No changes noted when compared to the previously taken photo.
Detail Level: Simple
Detail Level: Generalized

## 2024-09-18 NOTE — PROCEDURE: MEDICATION COUNSELING
Soolantra Pregnancy And Lactation Text: This medication is Pregnancy Category C. This medication is considered safe during breast feeding.
Sarecycline Pregnancy And Lactation Text: This medication is Pregnancy Category D and not consider safe during pregnancy. It is also excreted in breast milk.
Hydroxyzine Counseling: Patient advised that the medication is sedating and not to drive a car after taking this medication.  Patient informed of potential adverse effects including but not limited to dry mouth, urinary retention, and blurry vision.  The patient verbalized understanding of the proper use and possible adverse effects of hydroxyzine.  All of the patient's questions and concerns were addressed.
Prednisone Pregnancy And Lactation Text: This medication is Pregnancy Category C and it isn't know if it is safe during pregnancy. This medication is excreted in breast milk.
Picato Counseling:  I discussed with the patient the risks of Picato including but not limited to erythema, scaling, itching, weeping, crusting, and pain.
Topical Metronidazole Counseling: Metronidazole is a topical antibiotic medication. You may experience burning, stinging, redness, or allergic reactions.  Please call our office if you develop any problems from using this medication.
Bactrim Pregnancy And Lactation Text: This medication is Pregnancy Category D and is known to cause fetal risk.  It is also excreted in breast milk.
Winlevi Pregnancy And Lactation Text: This medication is considered safe during pregnancy and breastfeeding.
Siliq Counseling:  I discussed with the patient the risks of Siliq including but not limited to new or worsening depression, suicidal thoughts and behavior, immunosuppression, malignancy, posterior leukoencephalopathy syndrome, and serious infections.  The patient understands that monitoring is required including a PPD at baseline and must alert us or the primary physician if symptoms of infection or other concerning signs are noted. There is also a special program designed to monitor depression which is required with Siliq.
Rinvoq Pregnancy And Lactation Text: Based on animal studies, Rinvoq may cause embryo-fetal harm when administered to pregnant women.  The medication should not be used in pregnancy.  Breastfeeding is not recommended during treatment and for 6 days after the last dose.
Birth Control Pills Counseling: Birth Control Pill Counseling: I discussed with the patient the potential side effects of OCPs including but not limited to increased risk of stroke, heart attack, thrombophlebitis, deep venous thrombosis, hepatic adenomas, breast changes, GI upset, headaches, and depression.  The patient verbalized understanding of the proper use and possible adverse effects of OCPs. All of the patient's questions and concerns were addressed.
Cimzia Counseling:  I discussed with the patient the risks of Cimzia including but not limited to immunosuppression, allergic reactions and infections.  The patient understands that monitoring is required including a PPD at baseline and must alert us or the primary physician if symptoms of infection or other concerning signs are noted.
Humira Pregnancy And Lactation Text: This medication is Pregnancy Category B and is considered safe during pregnancy. It is unknown if this medication is excreted in breast milk.
Ketoconazole Counseling:   Patient counseled regarding improving absorption with orange juice.  Adverse effects include but are not limited to breast enlargement, headache, diarrhea, nausea, upset stomach, liver function test abnormalities, taste disturbance, and stomach pain.  There is a rare possibility of liver failure that can occur when taking ketoconazole. The patient understands that monitoring of LFTs may be required, especially at baseline. The patient verbalized understanding of the proper use and possible adverse effects of ketoconazole.  All of the patient's questions and concerns were addressed.
Odomzo Counseling- I discussed with the patient the risks of Odomzo including but not limited to nausea, vomiting, diarrhea, constipation, weight loss, changes in the sense of taste, decreased appetite, muscle spasms, and hair loss.  The patient verbalized understanding of the proper use and possible adverse effects of Odomzo.  All of the patient's questions and concerns were addressed.
Oxybutynin Pregnancy And Lactation Text: This medication is Pregnancy Category B and is considered safe during pregnancy. It is unknown if it is excreted in breast milk.
Nsaids Counseling: NSAID Counseling: I discussed with the patient that NSAIDs should be taken with food. Prolonged use of NSAIDs can result in the development of stomach ulcers.  Patient advised to stop taking NSAIDs if abdominal pain occurs.  The patient verbalized understanding of the proper use and possible adverse effects of NSAIDs.  All of the patient's questions and concerns were addressed.
Sotyktu Counseling:  I discussed the most common side effects of Sotyktu including: common cold, sore throat, sinus infections, cold sores, canker sores, folliculitis, and acne.? I also discussed more serious side effects of Sotyktu including but not limited to: serious allergic reactions; increased risk for infections such as TB; cancers such as lymphomas; rhabdomyolysis and elevated CPK; and elevated triglycerides and liver enzymes.?
Tranexamic Acid Counseling:  Patient advised of the small risk of bleeding problems with tranexamic acid. They were also instructed to call if they developed any nausea, vomiting or diarrhea. All of the patient's questions and concerns were addressed.
Isotretinoin Counseling: Patient should get monthly blood tests, not donate blood, not drive at night if vision affected, not share medication, and not undergo elective surgery for 6 months after tx completed. Side effects reviewed, pt to contact office should one occur.
Metronidazole Counseling:  I discussed with the patient the risks of metronidazole including but not limited to seizures, nausea/vomiting, a metallic taste in the mouth, nausea/vomiting and severe allergy.
Benzoyl Peroxide Counseling: Patient counseled that medicine may cause skin irritation and bleach clothing.  In the event of skin irritation, the patient was advised to reduce the amount of the drug applied or use it less frequently.   The patient verbalized understanding of the proper use and possible adverse effects of benzoyl peroxide.  All of the patient's questions and concerns were addressed.
Gabapentin Pregnancy And Lactation Text: This medication is Pregnancy Category C and isn't considered safe during pregnancy. It is excreted in breast milk.
Arava Counseling:  Patient counseled regarding adverse effects of Arava including but not limited to nausea, vomiting, abnormalities in liver function tests. Patients may develop mouth sores, rash, diarrhea, and abnormalities in blood counts. The patient understands that monitoring is required including LFTs and blood counts.  There is a rare possibility of scarring of the liver and lung problems that can occur when taking methotrexate. Persistent nausea, loss of appetite, pale stools, dark urine, cough, and shortness of breath should be reported immediately. Patient advised to discontinue Arava treatment and consult with a physician prior to attempting conception. The patient will have to undergo a treatment to eliminate Arava from the body prior to conception.
Drysol Counseling:  I discussed with the patient the risks of drysol/aluminum chloride including but not limited to skin rash, itching, irritation, burning.
Imiquimod Pregnancy And Lactation Text: This medication is Pregnancy Category C. It is unknown if this medication is excreted in breast milk.
Hydroxyzine Pregnancy And Lactation Text: This medication is not safe during pregnancy and should not be taken. It is also excreted in breast milk and breast feeding isn't recommended.
Cephalexin Counseling: I counseled the patient regarding use of cephalexin as an antibiotic for prophylactic and/or therapeutic purposes. Cephalexin (commonly prescribed under brand name Keflex) is a cephalosporin antibiotic which is active against numerous classes of bacteria, including most skin bacteria. Side effects may include nausea, diarrhea, gastrointestinal upset, rash, hives, yeast infections, and in rare cases, hepatitis, kidney disease, seizures, fever, confusion, neurologic symptoms, and others. Patients with severe allergies to penicillin medications are cautioned that there is about a 10% incidence of cross-reactivity with cephalosporins. When possible, patients with penicillin allergies should use alternatives to cephalosporins for antibiotic therapy.
Metronidazole Pregnancy And Lactation Text: This medication is Pregnancy Category B and considered safe during pregnancy.  It is also excreted in breast milk.
Cimzia Pregnancy And Lactation Text: This medication crosses the placenta but can be considered safe in certain situations. Cimzia may be excreted in breast milk.
Tetracycline Counseling: Patient counseled regarding possible photosensitivity and increased risk for sunburn.  Patient instructed to avoid sunlight, if possible.  When exposed to sunlight, patients should wear protective clothing, sunglasses, and sunscreen.  The patient was instructed to call the office immediately if the following severe adverse effects occur:  hearing changes, easy bruising/bleeding, severe headache, or vision changes.  The patient verbalized understanding of the proper use and possible adverse effects of tetracycline.  All of the patient's questions and concerns were addressed. Patient understands to avoid pregnancy while on therapy due to potential birth defects.
Hyrimoz Counseling:  I discussed with the patient the risks of adalimumab including but not limited to myelosuppression, immunosuppression, autoimmune hepatitis, demyelinating diseases, lymphoma, and serious infections.  The patient understands that monitoring is required including a PPD at baseline and must alert us or the primary physician if symptoms of infection or other concerning signs are noted.
Isotretinoin Pregnancy And Lactation Text: This medication is Pregnancy Category X and is considered extremely dangerous during pregnancy. It is unknown if it is excreted in breast milk.
Benzoyl Peroxide Pregnancy And Lactation Text: This medication is Pregnancy Category C. It is unknown if benzoyl peroxide is excreted in breast milk.
Drysol Pregnancy And Lactation Text: This medication is considered safe during pregnancy and breast feeding.
VTAMA Counseling: I discussed with the patient that VTAMA is not for use in the eyes, mouth or mouth. They should call the office if they develop any signs of allergic reactions to VTAMA. The patient verbalized understanding of the proper use and possible adverse effects of VTAMA.  All of the patient's questions and concerns were addressed.
Topical Retinoid counseling:  Patient advised to apply a pea-sized amount only at bedtime and wait 30 minutes after washing their face before applying.  If too drying, patient may add a non-comedogenic moisturizer. The patient verbalized understanding of the proper use and possible adverse effects of retinoids.  All of the patient's questions and concerns were addressed.
Cyclophosphamide Pregnancy And Lactation Text: This medication is Pregnancy Category D and it isn't considered safe during pregnancy. This medication is excreted in breast milk.
Topical Metronidazole Pregnancy And Lactation Text: This medication is Pregnancy Category B and considered safe during pregnancy.  It is also considered safe to use while breastfeeding.
Klisyri Counseling:  I discussed with the patient the risks of Klisyri including but not limited to erythema, scaling, itching, weeping, crusting, and pain.
Propranolol Counseling:  I discussed with the patient the risks of propranolol including but not limited to low heart rate, low blood pressure, low blood sugar, restlessness and increased cold sensitivity. They should call the office if they experience any of these side effects.
Taltz Counseling: I discussed with the patient the risks of ixekizumab including but not limited to immunosuppression, serious infections, worsening of inflammatory bowel disease and drug reactions.  The patient understands that monitoring is required including a PPD at baseline and must alert us or the primary physician if symptoms of infection or other concerning signs are noted.
Siliq Pregnancy And Lactation Text: The risk during pregnancy and breastfeeding is uncertain with this medication.
Birth Control Pills Pregnancy And Lactation Text: This medication should be avoided if pregnant and for the first 30 days post-partum.
Ketoconazole Pregnancy And Lactation Text: This medication is Pregnancy Category C and it isn't know if it is safe during pregnancy. It is also excreted in breast milk and breast feeding isn't recommended.
Nsaids Pregnancy And Lactation Text: These medications are considered safe up to 30 weeks gestation. It is excreted in breast milk.
Tranexamic Acid Pregnancy And Lactation Text: It is unknown if this medication is safe during pregnancy or breast feeding.
Cosentyx Counseling:  I discussed with the patient the risks of Cosentyx including but not limited to worsening of Crohn's disease, immunosuppression, allergic reactions and infections.  The patient understands that monitoring is required including a PPD at baseline and must alert us or the primary physician if symptoms of infection or other concerning signs are noted.
Vtama Pregnancy And Lactation Text: It is unknown if this medication can cause problems during pregnancy and breastfeeding.
Cyclosporine Counseling:  I discussed with the patient the risks of cyclosporine including but not limited to hypertension, gingival hyperplasia,myelosuppression, immunosuppression, liver damage, kidney damage, neurotoxicity, lymphoma, and serious infections. The patient understands that monitoring is required including baseline blood pressure, CBC, CMP, lipid panel and uric acid, and then 1-2 times monthly CMP and blood pressure.
Topical Steroids Counseling: I discussed with the patient that prolonged use of topical steroids can result in the increased appearance of superficial blood vessels (telangiectasias), lightening (hypopigmentation) and thinning of the skin (atrophy).  Patient understands to avoid using high potency steroids in skin folds, the groin or the face.  The patient verbalized understanding of the proper use and possible adverse effects of topical steroids.  All of the patient's questions and concerns were addressed.
Cibinqo Counseling: I discussed with the patient the risks of Cibinqo therapy including but not limited to common cold, nausea, headache, cold sores, increased blood CPK levels, dizziness, UTIs, fatigue, acne, and vomitting. Live vaccines should be avoided.  This medication has been linked to serious infections; higher rate of mortality; malignancy and lymphoproliferative disorders; major adverse cardiovascular events; thrombosis; thrombocytopenia and lymphopenia; lipid elevations; and retinal detachment.
Glycopyrrolate Counseling:  I discussed with the patient the risks of glycopyrrolate including but not limited to skin rash, drowsiness, dry mouth, difficulty urinating, and blurred vision.
Odomzo Pregnancy And Lactation Text: This medication is Pregnancy Category X and is absolutely contraindicated during pregnancy. It is unknown if it is excreted in breast milk.
Sotyktu Pregnancy And Lactation Text: There is insufficient data to evaluate whether or not Sotyktu is safe to use during pregnancy.? ?It is not known if Sotyktu passes into breast milk and whether or not it is safe to use when breastfeeding.??
Klisyri Pregnancy And Lactation Text: It is unknown if this medication can harm a developing fetus or if it is excreted in breast milk.
Simponi Counseling:  I discussed with the patient the risks of golimumab including but not limited to myelosuppression, immunosuppression, autoimmune hepatitis, demyelinating diseases, lymphoma, and serious infections.  The patient understands that monitoring is required including a PPD at baseline and must alert us or the primary physician if symptoms of infection or other concerning signs are noted.
Spironolactone Counseling: Patient advised regarding risks of diarrhea, abdominal pain, hyperkalemia, birth defects (for female patients), liver toxicity and renal toxicity. The patient may need blood work to monitor liver and kidney function and potassium levels while on therapy. The patient verbalized understanding of the proper use and possible adverse effects of spironolactone.  All of the patient's questions and concerns were addressed.
Carac Counseling:  I discussed with the patient the risks of Carac including but not limited to erythema, scaling, itching, weeping, crusting, and pain.
Elidel Counseling: Patient may experience a mild burning sensation during topical application. Elidel is not approved in children less than 2 years of age. There have been case reports of hematologic and skin malignancies in patients using topical calcineurin inhibitors although causality is questionable.
Olanzapine Counseling- I discussed with the patient the common side effects of olanzapine including but are not limited to: lack of energy, dry mouth, increased appetite, sleepiness, tremor, constipation, dizziness, changes in behavior, or restlessness.  Explained that teenagers are more likely to experience headaches, abdominal pain, pain in the arms or legs, tiredness, and sleepiness.  Serious side effects include but are not limited: increased risk of death in elderly patients who are confused, have memory loss, or dementia-related psychosis; hyperglycemia; increased cholesterol and triglycerides; and weight gain.
Detail Level: Generalized
Cephalexin Pregnancy And Lactation Text: This medication is Pregnancy Category B and considered safe during pregnancy.  It is also excreted in breast milk but can be used safely for shorter doses.
High Dose Vitamin A Counseling: Side effects reviewed, pt to contact office should one occur.
Protopic Counseling: Patient may experience a mild burning sensation during topical application. Protopic is not approved in children less than 2 years of age. There have been case reports of hematologic and skin malignancies in patients using topical calcineurin inhibitors although causality is questionable.
Minocycline Counseling: Patient advised regarding possible photosensitivity and discoloration of the teeth, skin, lips, tongue and gums.  Patient instructed to avoid sunlight, if possible.  When exposed to sunlight, patients should wear protective clothing, sunglasses, and sunscreen.  The patient was instructed to call the office immediately if the following severe adverse effects occur:  hearing changes, easy bruising/bleeding, severe headache, or vision changes.  The patient verbalized understanding of the proper use and possible adverse effects of minocycline.  All of the patient's questions and concerns were addressed.
Valtrex Counseling: I discussed with the patient the risks of valacyclovir including but not limited to kidney damage, nausea, vomiting and severe allergy.  The patient understands that if the infection seems to be worsening or is not improving, they are to call.
Topical Steroids Applications Pregnancy And Lactation Text: Most topical steroids are considered safe to use during pregnancy and lactation.  Any topical steroid applied to the breast or nipple should be washed off before breastfeeding.
Zoryve Counseling:  I discussed with the patient that Zoryve is not for use in the eyes, mouth or vagina. The most commonly reported side effects include diarrhea, headache, insomnia, application site pain, upper respiratory tract infections, and urinary tract infections.  All of the patient's questions and concerns were addressed.
Tazorac Counseling:  Patient advised that medication is irritating and drying.  Patient may need to apply sparingly and wash off after an hour before eventually leaving it on overnight.  The patient verbalized understanding of the proper use and possible adverse effects of tazorac.  All of the patient's questions and concerns were addressed.
Glycopyrrolate Pregnancy And Lactation Text: This medication is Pregnancy Category B and is considered safe during pregnancy. It is unknown if it is excreted breast milk.
Clofazimine Counseling:  I discussed with the patient the risks of clofazimine including but not limited to skin and eye pigmentation, liver damage, nausea/vomiting, gastrointestinal bleeding and allergy.
Propranolol Pregnancy And Lactation Text: This medication is Pregnancy Category C and it isn't known if it is safe during pregnancy. It is excreted in breast milk.
Terbinafine Counseling: Patient counseling regarding adverse effects of terbinafine including but not limited to headache, diarrhea, rash, upset stomach, liver function test abnormalities, itching, taste/smell disturbance, nausea, abdominal pain, and flatulence.  There is a rare possibility of liver failure that can occur when taking terbinafine.  The patient understands that a baseline LFT and kidney function test may be required. The patient verbalized understanding of the proper use and possible adverse effects of terbinafine.  All of the patient's questions and concerns were addressed.
Cibinqo Pregnancy And Lactation Text: It is unknown if this medication will adversely affect pregnancy or breast feeding.  You should not take this medication if you are currently pregnant or planning a pregnancy or while breastfeeding.
Albendazole Counseling:  I discussed with the patient the risks of albendazole including but not limited to cytopenia, kidney damage, nausea/vomiting and severe allergy.  The patient understands that this medication is being used in an off-label manner.
Xeljanz Counseling: I discussed with the patient the risks of Xeljanz therapy including increased risk of infection, liver issues, headache, diarrhea, or cold symptoms. Live vaccines should be avoided. They were instructed to call if they have any problems.
Minoxidil Counseling: Minoxidil is a topical medication which can increase blood flow where it is applied. It is uncertain how this medication increases hair growth. Side effects are uncommon and include stinging and allergic reactions.
Terbinafine Pregnancy And Lactation Text: This medication is Pregnancy Category B and is considered safe during pregnancy. It is also excreted in breast milk and breast feeding isn't recommended.
Fluconazole Counseling:  Patient counseled regarding adverse effects of fluconazole including but not limited to headache, diarrhea, nausea, upset stomach, liver function test abnormalities, taste disturbance, and stomach pain.  There is a rare possibility of liver failure that can occur when taking fluconazole.  The patient understands that monitoring of LFTs and kidney function test may be required, especially at baseline. The patient verbalized understanding of the proper use and possible adverse effects of fluconazole.  All of the patient's questions and concerns were addressed.
Spironolactone Pregnancy And Lactation Text: This medication can cause feminization of the male fetus and should be avoided during pregnancy. The active metabolite is also found in breast milk.
Dutasteride Male Counseling: Dustasteride Counseling:  I discussed with the patient the risks of use of dutasteride including but not limited to decreased libido, decreased ejaculate volume, and gynecomastia. Women who can become pregnant should not handle medication.  All of the patient's questions and concerns were addressed.
Ilumya Counseling: I discussed with the patient the risks of tildrakizumab including but not limited to immunosuppression, malignancy, posterior leukoencephalopathy syndrome, and serious infections.  The patient understands that monitoring is required including a PPD at baseline and must alert us or the primary physician if symptoms of infection or other concerning signs are noted.
Hydroxychloroquine Counseling:  I discussed with the patient that a baseline ophthalmologic exam is needed at the start of therapy and every year thereafter while on therapy. A CBC may also be warranted for monitoring.  The side effects of this medication were discussed with the patient, including but not limited to agranulocytosis, aplastic anemia, seizures, rashes, retinopathy, and liver toxicity. Patient instructed to call the office should any adverse effect occur.  The patient verbalized understanding of the proper use and possible adverse effects of Plaquenil.  All the patient's questions and concerns were addressed.
Include Pregnancy/Lactation Warning?: No
Clindamycin Counseling: I counseled the patient regarding use of clindamycin as an antibiotic for prophylactic and/or therapeutic purposes. Clindamycin is active against numerous classes of bacteria, including skin bacteria. Side effects may include nausea, diarrhea, gastrointestinal upset, rash, hives, yeast infections, and in rare cases, colitis.
Protopic Pregnancy And Lactation Text: This medication is Pregnancy Category C. It is unknown if this medication is excreted in breast milk when applied topically.
SSKI Counseling:  I discussed with the patient the risks of SSKI including but not limited to thyroid abnormalities, metallic taste, GI upset, fever, headache, acne, arthralgias, paraesthesias, lymphadenopathy, easy bleeding, arrhythmias, and allergic reaction.
Tremfya Counseling: I discussed with the patient the risks of guselkumab including but not limited to immunosuppression, serious infections, worsening of inflammatory bowel disease and drug reactions.  The patient understands that monitoring is required including a PPD at baseline and must alert us or the primary physician if symptoms of infection or other concerning signs are noted.
Olanzapine Pregnancy And Lactation Text: This medication is pregnancy category C.   There are no adequate and well controlled trials with olanzapine in pregnant females.  Olanzapine should be used during pregnancy only if the potential benefit justifies the potential risk to the fetus.   In a study in lactating healthy women, olanzapine was excreted in breast milk.  It is recommended that women taking olanzapine should not breast feed.
High Dose Vitamin A Pregnancy And Lactation Text: High dose vitamin A therapy is contraindicated during pregnancy and breast feeding.
Carac Pregnancy And Lactation Text: This medication is Pregnancy Category X and contraindicated in pregnancy and in women who may become pregnant. It is unknown if this medication is excreted in breast milk.
Methotrexate Counseling:  Patient counseled regarding adverse effects of methotrexate including but not limited to nausea, vomiting, abnormalities in liver function tests. Patients may develop mouth sores, rash, diarrhea, and abnormalities in blood counts. The patient understands that monitoring is required including LFT's and blood counts.  There is a rare possibility of scarring of the liver and lung problems that can occur when taking methotrexate. Persistent nausea, loss of appetite, pale stools, dark urine, cough, and shortness of breath should be reported immediately. Patient advised to discontinue methotrexate treatment at least three months before attempting to become pregnant.  I discussed the need for folate supplements while taking methotrexate.  These supplements can decrease side effects during methotrexate treatment. The patient verbalized understanding of the proper use and possible adverse effects of methotrexate.  All of the patient's questions and concerns were addressed.
Topical Sulfur Applications Counseling: Topical Sulfur Counseling: Patient counseled that this medication may cause skin irritation or allergic reactions.  In the event of skin irritation, the patient was advised to reduce the amount of the drug applied or use it less frequently.   The patient verbalized understanding of the proper use and possible adverse effects of topical sulfur application.  All of the patient's questions and concerns were addressed.
Tazorac Pregnancy And Lactation Text: This medication is not safe during pregnancy. It is unknown if this medication is excreted in breast milk.
Qbrexza Counseling:  I discussed with the patient the risks of Qbrexza including but not limited to headache, mydriasis, blurred vision, dry eyes, nasal dryness, dry mouth, dry throat, dry skin, urinary hesitation, and constipation.  Local skin reactions including erythema, burning, stinging, and itching can also occur.
Clindamycin Pregnancy And Lactation Text: This medication can be used in pregnancy if certain situations. Clindamycin is also present in breast milk.
Xolair Pregnancy And Lactation Text: This medication is Pregnancy Category B and is considered safe during pregnancy. This medication is excreted in breast milk.
Litfulo Counseling: I discussed with the patient the risks of Litfulo therapy including but not limited to upper respiratory tract infections, shingles, cold sores, and nausea. Live vaccines should be avoided.  This medication has been linked to serious infections; higher rate of mortality; malignancy and lymphoproliferative disorders; major adverse cardiovascular events; thrombosis; gastrointestinal perforations; neutropenia; lymphopenia; anemia; liver enzyme elevations; and lipid elevations.
Albendazole Pregnancy And Lactation Text: This medication is Pregnancy Category C and it isn't known if it is safe during pregnancy. It is also excreted in breast milk.
Xelmonsterz Pregnancy And Lactation Text: This medication is Pregnancy Category D and is not considered safe during pregnancy.  The risk during breast feeding is also uncertain.
Valtrex Pregnancy And Lactation Text: this medication is Pregnancy Category B and is considered safe during pregnancy. This medication is not directly found in breast milk but it's metabolite acyclovir is present.
Dupixent Counseling: I discussed with the patient the risks of dupilumab including but not limited to eye infection and irritation, cold sores, injection site reactions, worsening of asthma, allergic reactions and increased risk of parasitic infection.  Live vaccines should be avoided while taking dupilumab. Dupilumab will also interact with certain medications such as warfarin and cyclosporine. The patient understands that monitoring is required and they must alert us or the primary physician if symptoms of infection or other concerning signs are noted.
Dutasteride Female Counseling: Dutasteride Counseling:  I discussed with the patient the risks of use of dutasteride including but not limited to decreased libido and sexual dysfunction. Explained the teratogenic nature of the medication and stressed the importance of not getting pregnant during treatment. All of the patient's questions and concerns were addressed.
Sski Pregnancy And Lactation Text: This medication is Pregnancy Category D and isn't considered safe during pregnancy. It is excreted in breast milk.
Litfulo Pregnancy And Lactation Text: Based on animal studies, Lifulo may cause embryo-fetal harm when administered to pregnant women.  The medication should not be used in pregnancy.  Breastfeeding is not recommended during treatment.
Skyrizi Counseling: I discussed with the patient the risks of risankizumab-rzaa including but not limited to immunosuppression, and serious infections.  The patient understands that monitoring is required including a PPD at baseline and must alert us or the primary physician if symptoms of infection or other concerning signs are noted.
Oral Minoxidil Counseling- I discussed with the patient the risks of oral minoxidil including but not limited to shortness of breath, swelling of the feet or ankles, dizziness, lightheadedness, unwanted hair growth and allergic reaction.  The patient verbalized understanding of the proper use and possible adverse effects of oral minoxidil.  All of the patient's questions and concerns were addressed.
Fluconazole Pregnancy And Lactation Text: This medication is Pregnancy Category C and it isn't know if it is safe during pregnancy. It is also excreted in breast milk.
Quinolones Counseling:  I discussed with the patient the risks of fluoroquinolones including but not limited to GI upset, allergic reaction, drug rash, diarrhea, dizziness, photosensitivity, yeast infections, liver function test abnormalities, tendonitis/tendon rupture.
Azathioprine Counseling:  I discussed with the patient the risks of azathioprine including but not limited to myelosuppression, immunosuppression, hepatotoxicity, lymphoma, and infections.  The patient understands that monitoring is required including baseline LFTs, Creatinine, possible TPMP genotyping and weekly CBCs for the first month and then every 2 weeks thereafter.  The patient verbalized understanding of the proper use and possible adverse effects of azathioprine.  All of the patient's questions and concerns were addressed.
Eucrisa Counseling: Patient may experience a mild burning sensation during topical application. Eucrisa is not approved in children less than 2 years of age.
Colchicine Counseling:  Patient counseled regarding adverse effects including but not limited to stomach upset (nausea, vomiting, stomach pain, or diarrhea).  Patient instructed to limit alcohol consumption while taking this medication.  Colchicine may reduce blood counts especially with prolonged use.  The patient understands that monitoring of kidney function and blood counts may be required, especially at baseline. The patient verbalized understanding of the proper use and possible adverse effects of colchicine.  All of the patient's questions and concerns were addressed.
Calcipotriene Counseling:  I discussed with the patient the risks of calcipotriene including but not limited to erythema, scaling, itching, and irritation.
Minoxidil Pregnancy And Lactation Text: This medication has not been assigned a Pregnancy Risk Category but animal studies failed to show danger with the topical medication. It is unknown if the medication is excreted in breast milk.
Hydroxychloroquine Pregnancy And Lactation Text: This medication has been shown to cause fetal harm but it isn't assigned a Pregnancy Risk Category. There are small amounts excreted in breast milk.
Doxycycline Counseling:  Patient counseled regarding possible photosensitivity and increased risk for sunburn.  Patient instructed to avoid sunlight, if possible.  When exposed to sunlight, patients should wear protective clothing, sunglasses, and sunscreen.  The patient was instructed to call the office immediately if the following severe adverse effects occur:  hearing changes, easy bruising/bleeding, severe headache, or vision changes.  The patient verbalized understanding of the proper use and possible adverse effects of doxycycline.  All of the patient's questions and concerns were addressed.
Acitretin Counseling:  I discussed with the patient the risks of acitretin including but not limited to hair loss, dry lips/skin/eyes, liver damage, hyperlipidemia, depression/suicidal ideation, photosensitivity.  Serious rare side effects can include but are not limited to pancreatitis, pseudotumor cerebri, bony changes, clot formation/stroke/heart attack.  Patient understands that alcohol is contraindicated since it can result in liver toxicity and significantly prolong the elimination of the drug by many years.
Qbrexza Pregnancy And Lactation Text: There is no available data on Qbrexza use in pregnant women.  There is no available data on Qbrexza use in lactation.
Cimetidine Counseling:  I discussed with the patient the risks of Cimetidine including but not limited to gynecomastia, headache, diarrhea, nausea, drowsiness, arrhythmias, pancreatitis, skin rashes, psychosis, bone marrow suppression and kidney toxicity.
Zyclara Counseling:  I discussed with the patient the risks of imiquimod including but not limited to erythema, scaling, itching, weeping, crusting, and pain.  Patient understands that the inflammatory response to imiquimod is variable from person to person and was educated regarded proper titration schedule.  If flu-like symptoms develop, patient knows to discontinue the medication and contact us.
Mirvaso Counseling: Mirvaso is a topical medication which can decrease superficial blood flow where applied. Side effects are uncommon and include stinging, redness and allergic reactions.
Topical Clindamycin Counseling: Patient counseled that this medication may cause skin irritation or allergic reactions.  In the event of skin irritation, the patient was advised to reduce the amount of the drug applied or use it less frequently.   The patient verbalized understanding of the proper use and possible adverse effects of clindamycin.  All of the patient's questions and concerns were addressed.
Methotrexate Pregnancy And Lactation Text: This medication is Pregnancy Category X and is known to cause fetal harm. This medication is excreted in breast milk.
Topical Sulfur Applications Pregnancy And Lactation Text: This medication is Pregnancy Category C and has an unknown safety profile during pregnancy. It is unknown if this topical medication is excreted in breast milk.
Ivermectin Counseling:  Patient instructed to take medication on an empty stomach with a full glass of water.  Patient informed of potential adverse effects including but not limited to nausea, diarrhea, dizziness, itching, and swelling of the extremities or lymph nodes.  The patient verbalized understanding of the proper use and possible adverse effects of ivermectin.  All of the patient's questions and concerns were addressed.
Xolair Counseling:  Patient informed of potential adverse effects including but not limited to fever, muscle aches, rash and allergic reactions.  The patient verbalized understanding of the proper use and possible adverse effects of Xolair.  All of the patient's questions and concerns were addressed.
Infliximab Counseling:  I discussed with the patient the risks of infliximab including but not limited to myelosuppression, immunosuppression, autoimmune hepatitis, demyelinating diseases, lymphoma, and serious infections.  The patient understands that monitoring is required including a PPD at baseline and must alert us or the primary physician if symptoms of infection or other concerning signs are noted.
Dutasteride Pregnancy And Lactation Text: This medication is absolutely contraindicated in women, especially during pregnancy and breast feeding. Feminization of male fetuses is possible if taking while pregnant.
Dupixent Pregnancy And Lactation Text: This medication likely crosses the placenta but the risk for the fetus is uncertain. This medication is excreted in breast milk.
Erivedge Counseling- I discussed with the patient the risks of Erivedge including but not limited to nausea, vomiting, diarrhea, constipation, weight loss, changes in the sense of taste, decreased appetite, muscle spasms, and hair loss.  The patient verbalized understanding of the proper use and possible adverse effects of Erivedge.  All of the patient's questions and concerns were addressed.
Griseofulvin Counseling:  I discussed with the patient the risks of griseofulvin including but not limited to photosensitivity, cytopenia, liver damage, nausea/vomiting and severe allergy.  The patient understands that this medication is best absorbed when taken with a fatty meal (e.g., ice cream or french fries).
Oral Minoxidil Pregnancy And Lactation Text: This medication should only be used when clearly needed if you are pregnant, attempting to become pregnant or breast feeding.
Olumiant Counseling: I discussed with the patient the risks of Olumiant therapy including but not limited to upper respiratory tract infections, shingles, cold sores, and nausea. Live vaccines should be avoided.  This medication has been linked to serious infections; higher rate of mortality; malignancy and lymphoproliferative disorders; major adverse cardiovascular events; thrombosis; gastrointestinal perforations; neutropenia; lymphopenia; anemia; liver enzyme elevations; and lipid elevations.
Aklief counseling:  Patient advised to apply a pea-sized amount only at bedtime and wait 30 minutes after washing their face before applying.  If too drying, patient may add a non-comedogenic moisturizer.  The most commonly reported side effects including irritation, redness, scaling, dryness, stinging, burning, itching, and increased risk of sunburn.  The patient verbalized understanding of the proper use and possible adverse effects of retinoids.  All of the patient's questions and concerns were addressed.
Calcipotriene Pregnancy And Lactation Text: The use of this medication during pregnancy or lactation is not recommended as there is insufficient data.
Wartpeel Counseling:  I discussed with the patient the risks of Wartpeel including but not limited to erythema, scaling, itching, weeping, crusting, and pain.
Thalidomide Counseling: I discussed with the patient the risks of thalidomide including but not limited to birth defects, anxiety, weakness, chest pain, dizziness, cough and severe allergy.
Azathioprine Pregnancy And Lactation Text: This medication is Pregnancy Category D and isn't considered safe during pregnancy. It is unknown if this medication is excreted in breast milk.
Low Dose Naltrexone Counseling- I discussed with the patient the potential risks and side effects of low dose naltrexone including but not limited to: more vivid dreams, headaches, nausea, vomiting, abdominal pain, fatigue, dizziness, and anxiety.
Mirvaso Pregnancy And Lactation Text: This medication has not been assigned a Pregnancy Risk Category. It is unknown if the medication is excreted in breast milk.
Azithromycin Counseling:  I discussed with the patient the risks of azithromycin including but not limited to GI upset, allergic reaction, drug rash, diarrhea, and yeast infections.
Finasteride Male Counseling: Finasteride Counseling:  I discussed with the patient the risks of use of finasteride including but not limited to decreased libido, decreased ejaculate volume, gynecomastia, and depression. Women should not handle medication.  All of the patient's questions and concerns were addressed.
Acitretin Pregnancy And Lactation Text: This medication is Pregnancy Category X and should not be given to women who are pregnant or may become pregnant in the future. This medication is excreted in breast milk.
Doxycycline Pregnancy And Lactation Text: This medication is Pregnancy Category D and not consider safe during pregnancy. It is also excreted in breast milk but is considered safe for shorter treatment courses.
Enbrel Counseling:  I discussed with the patient the risks of etanercept including but not limited to myelosuppression, immunosuppression, autoimmune hepatitis, demyelinating diseases, lymphoma, and infections.  The patient understands that monitoring is required including a PPD at baseline and must alert us or the primary physician if symptoms of infection or other concerning signs are noted.
Aklief Pregnancy And Lactation Text: It is unknown if this medication is safe to use during pregnancy.  It is unknown if this medication is excreted in breast milk.  Breastfeeding women should use the topical cream on the smallest area of the skin for the shortest time needed while breastfeeding.  Do not apply to nipple and areola.
Cantharidin Counseling:  I discussed with the patient the risks of Cantharidin including but not limited to pain, redness, burning, itching, and blistering.
Hydroquinone Counseling:  Patient advised that medication may result in skin irritation, lightening (hypopigmentation), dryness, and burning.  In the event of skin irritation, the patient was advised to reduce the amount of the drug applied or use it less frequently.  Rarely, spots that are treated with hydroquinone can become darker (pseudoochronosis).  Should this occur, patient instructed to stop medication and call the office. The patient verbalized understanding of the proper use and possible adverse effects of hydroquinone.  All of the patient's questions and concerns were addressed.
Otezla Counseling: The side effects of Otezla were discussed with the patient, including but not limited to worsening or new depression, weight loss, diarrhea, nausea, upper respiratory tract infection, and headache. Patient instructed to call the office should any adverse effect occur.  The patient verbalized understanding of the proper use and possible adverse effects of Otezla.  All the patient's questions and concerns were addressed.
Spevigo Counseling: I discussed with the patient the risks of Spevigo including but not limited to fatigue, nasuea, vomiting, headache, pruritus, urinary tract infection, an infusion related reactions.  The patient understands that monitoring is required including screening for tuberculosis at baseline and yearly screening thereafter while continuing Spevigo therapy. They should contact us if symptoms of infection or other concerning signs are noted.
Rhofade Counseling: Rhofade is a topical medication which can decrease superficial blood flow where applied. Side effects are uncommon and include stinging, redness and allergic reactions.
Rifampin Counseling: I discussed with the patient the risks of rifampin including but not limited to liver damage, kidney damage, red-orange body fluids, nausea/vomiting and severe allergy.
Cantharidin Pregnancy And Lactation Text: This medication has not been proven safe during pregnancy. It is unknown if this medication is excreted in breast milk.
Low Dose Naltrexone Pregnancy And Lactation Text: Naltrexone is pregnancy category C.  There have been no adequate and well-controlled studies in pregnant women.  It should be used in pregnancy only if the potential benefit justifies the potential risk to the fetus.   Limited data indicates that naltrexone is minimally excreted into breastmilk.
Bimzelx Counseling:  I discussed with the patient the risks of Bimzelx including but not limited to depression, immunosuppression, allergic reactions and infections.  The patient understands that monitoring is required including a PPD at baseline and must alert us or the primary physician if symptoms of infection or other concerning signs are noted.
Griseofulvin Pregnancy And Lactation Text: This medication is Pregnancy Category X and is known to cause serious birth defects. It is unknown if this medication is excreted in breast milk but breast feeding should be avoided.
Solaraze Pregnancy And Lactation Text: This medication is Pregnancy Category B and is considered safe. There is some data to suggest avoiding during the third trimester. It is unknown if this medication is excreted in breast milk.
Cellcept Counseling:  I discussed with the patient the risks of mycophenolate mofetil including but not limited to infection/immunosuppression, GI upset, hypokalemia, hypercholesterolemia, bone marrow suppression, lymphoproliferative disorders, malignancy, GI ulceration/bleed/perforation, colitis, interstitial lung disease, kidney failure, progressive multifocal leukoencephalopathy, and birth defects.  The patient understands that monitoring is required including a baseline creatinine and regular CBC testing. In addition, patient must alert us immediately if symptoms of infection or other concerning signs are noted.
Topical Ketoconazole Counseling: Patient counseled that this medication may cause skin irritation or allergic reactions.  In the event of skin irritation, the patient was advised to reduce the amount of the drug applied or use it less frequently.   The patient verbalized understanding of the proper use and possible adverse effects of ketoconazole.  All of the patient's questions and concerns were addressed.
Dapsone Counseling: I discussed with the patient the risks of dapsone including but not limited to hemolytic anemia, agranulocytosis, rashes, methemoglobinemia, kidney failure, peripheral neuropathy, headaches, GI upset, and liver toxicity.  Patients who start dapsone require monitoring including baseline LFTs and weekly CBCs for the first month, then every month thereafter.  The patient verbalized understanding of the proper use and possible adverse effects of dapsone.  All of the patient's questions and concerns were addressed.
Olumiant Pregnancy And Lactation Text: Based on animal studies, Olumiant may cause embryo-fetal harm when administered to pregnant women.  The medication should not be used in pregnancy.  Breastfeeding is not recommended during treatment.
Adbry Counseling: I discussed with the patient the risks of tralokinumab including but not limited to eye infection and irritation, cold sores, injection site reactions, worsening of asthma, allergic reactions and increased risk of parasitic infection.  Live vaccines should be avoided while taking tralokinumab. The patient understands that monitoring is required and they must alert us or the primary physician if symptoms of infection or other concerning signs are noted.
Finasteride Female Counseling: Finasteride Counseling:  I discussed with the patient the risks of use of finasteride including but not limited to decreased libido and sexual dysfunction. Explained the teratogenic nature of the medication and stressed the importance of not getting pregnant during treatment. All of the patient's questions and concerns were addressed.
Rituxan Counseling:  I discussed with the patient the risks of Rituxan infusions. Side effects can include infusion reactions, severe drug rashes including mucocutaneous reactions, reactivation of latent hepatitis and other infections and rarely progressive multifocal leukoencephalopathy.  All of the patient's questions and concerns were addressed.
Opioid Counseling: I discussed with the patient the potential side effects of opioids including but not limited to addiction, altered mental status, and depression. I stressed avoiding alcohol, benzodiazepines, muscle relaxants and sleep aids unless specifically okayed by a physician. The patient verbalized understanding of the proper use and possible adverse effects of opioids. All of the patient's questions and concerns were addressed. They were instructed to flush the remaining pills down the toilet if they did not need them for pain.
Opzelura Counseling:  I discussed with the patient the risks of Opzelura including but not limited to nasopharngitis, bronchitis, ear infection, eosinophila, hives, diarrhea, folliculitis, tonsillitis, and rhinorrhea.  Taken orally, this medication has been linked to serious infections; higher rate of mortality; malignancy and lymphoproliferative disorders; major adverse cardiovascular events; thrombosis; thrombocytopenia, anemia, and neutropenia; and lipid elevations.
Niacinamide Counseling: I recommended taking niacin or niacinamide, also know as vitamin B3, twice daily. Recent evidence suggests that taking vitamin B3 (500 mg twice daily) can reduce the risk of actinic keratoses and non-melanoma skin cancers. Side effects of vitamin B3 include flushing and headache.
Doxepin Counseling:  Patient advised that the medication is sedating and not to drive a car after taking this medication. Patient informed of potential adverse effects including but not limited to dry mouth, urinary retention, and blurry vision.  The patient verbalized understanding of the proper use and possible adverse effects of doxepin.  All of the patient's questions and concerns were addressed.
Azithromycin Pregnancy And Lactation Text: This medication is considered safe during pregnancy and is also secreted in breast milk.
Erythromycin Counseling:  I discussed with the patient the risks of erythromycin including but not limited to GI upset, allergic reaction, drug rash, diarrhea, increase in liver enzymes, and yeast infections.
Bexarotene Counseling:  I discussed with the patient the risks of bexarotene including but not limited to hair loss, dry lips/skin/eyes, liver abnormalities, hyperlipidemia, pancreatitis, depression/suicidal ideation, photosensitivity, drug rash/allergic reactions, hypothyroidism, anemia, leukopenia, infection, cataracts, and teratogenicity.  Patient understands that they will need regular blood tests to check lipid profile, liver function tests, white blood cell count, thyroid function tests and pregnancy test if applicable.
Azelaic Acid Counseling: Patient counseled that medicine may cause skin irritation and to avoid applying near the eyes.  In the event of skin irritation, the patient was advised to reduce the amount of the drug applied or use it less frequently.   The patient verbalized understanding of the proper use and possible adverse effects of azelaic acid.  All of the patient's questions and concerns were addressed.
Otezla Pregnancy And Lactation Text: This medication is Pregnancy Category C and it isn't known if it is safe during pregnancy. It is unknown if it is excreted in breast milk.
Rifampin Pregnancy And Lactation Text: This medication is Pregnancy Category C and it isn't know if it is safe during pregnancy. It is also excreted in breast milk and should not be used if you are breast feeding.
5-Fu Counseling: 5-Fluorouracil Counseling:  I discussed with the patient the risks of 5-fluorouracil including but not limited to erythema, scaling, itching, weeping, crusting, and pain.
Spevigo Pregnancy And Lactation Text: The risk during pregnancy and breastfeeding is uncertain with this medication. This medication does cross the placenta. It is unknown if this medication is found in breast milk.
Winlevi Counseling:  I discussed with the patient the risks of topical clascoterone including but not limited to erythema, scaling, itching, and stinging. Patient voiced their understanding.
Soolantra Counseling: I discussed with the patients the risks of topial Soolantra. This is a medicine which decreases the number of mites and inflammation in the skin. You experience burning, stinging, eye irritation or allergic reactions.  Please call our office if you develop any problems from using this medication.
Dapsone Pregnancy And Lactation Text: This medication is Pregnancy Category C and is not considered safe during pregnancy or breast feeding.
Prednisone Counseling:  I discussed with the patient the risks of prolonged use of prednisone including but not limited to weight gain, insomnia, osteoporosis, mood changes, diabetes, susceptibility to infection, glaucoma and high blood pressure.  In cases where prednisone use is prolonged, patients should be monitored with blood pressure checks, serum glucose levels and an eye exam.  Additionally, the patient may need to be placed on GI prophylaxis, PCP prophylaxis, and calcium and vitamin D supplementation and/or a bisphosphonate.  The patient verbalized understanding of the proper use and the possible adverse effects of prednisone.  All of the patient's questions and concerns were addressed.
Libtayo Counseling- I discussed with the patient the risks of Libtayo including but not limited to nausea, vomiting, diarrhea, and bone or muscle pain.  The patient verbalized understanding of the proper use and possible adverse effects of Libtayo.  All of the patient's questions and concerns were addressed.
Bactrim Counseling:  I discussed with the patient the risks of sulfa antibiotics including but not limited to GI upset, allergic reaction, drug rash, diarrhea, dizziness, photosensitivity, and yeast infections.  Rarely, more serious reactions can occur including but not limited to aplastic anemia, agranulocytosis, methemoglobinemia, blood dyscrasias, liver or kidney failure, lung infiltrates or desquamative/blistering drug rashes.
Solaraze Counseling:  I discussed with the patient the risks of Solaraze including but not limited to erythema, scaling, itching, weeping, crusting, and pain.
Bexarotene Pregnancy And Lactation Text: This medication is Pregnancy Category X and should not be given to women who are pregnant or may become pregnant. This medication should not be used if you are breast feeding.
Erythromycin Pregnancy And Lactation Text: This medication is Pregnancy Category B and is considered safe during pregnancy. It is also excreted in breast milk.
Opioid Pregnancy And Lactation Text: These medications can lead to premature delivery and should be avoided during pregnancy. These medications are also present in breast milk in small amounts.
Bimzelx Pregnancy And Lactation Text: This medication crosses the placenta and the safety is uncertain during pregnancy. It is unknown if this medication is present in breast milk.
Rinvoq Counseling: I discussed with the patient the risks of Rinvoq therapy including but not limited to upper respiratory tract infections, shingles, cold sores, bronchitis, nausea, cough, fever, acne, and headache. Live vaccines should be avoided.  This medication has been linked to serious infections; higher rate of mortality; malignancy and lymphoproliferative disorders; major adverse cardiovascular events; thrombosis; thrombocytopenia, anemia, and neutropenia; lipid elevations; liver enzyme elevations; and gastrointestinal perforations.
Itraconazole Counseling:  I discussed with the patient the risks of itraconazole including but not limited to liver damage, nausea/vomiting, neuropathy, and severe allergy.  The patient understands that this medication is best absorbed when taken with acidic beverages such as non-diet cola or ginger ale.  The patient understands that monitoring is required including baseline LFTs and repeat LFTs at intervals.  The patient understands that they are to contact us or the primary physician if concerning signs are noted.
Stelara Counseling:  I discussed with the patient the risks of ustekinumab including but not limited to immunosuppression, malignancy, posterior leukoencephalopathy syndrome, and serious infections.  The patient understands that monitoring is required including a PPD at baseline and must alert us or the primary physician if symptoms of infection or other concerning signs are noted.
Rituxan Pregnancy And Lactation Text: This medication is Pregnancy Category C and it isn't know if it is safe during pregnancy. It is unknown if this medication is excreted in breast milk but similar antibodies are known to be excreted.
Humira Counseling:  I discussed with the patient the risks of adalimumab including but not limited to myelosuppression, immunosuppression, autoimmune hepatitis, demyelinating diseases, lymphoma, and serious infections.  The patient understands that monitoring is required including a PPD at baseline and must alert us or the primary physician if symptoms of infection or other concerning signs are noted.
Finasteride Pregnancy And Lactation Text: This medication is absolutely contraindicated during pregnancy. It is unknown if it is excreted in breast milk.
Sarecycline Counseling: Patient advised regarding possible photosensitivity and discoloration of the teeth, skin, lips, tongue and gums.  Patient instructed to avoid sunlight, if possible.  When exposed to sunlight, patients should wear protective clothing, sunglasses, and sunscreen.  The patient was instructed to call the office immediately if the following severe adverse effects occur:  hearing changes, easy bruising/bleeding, severe headache, or vision changes.  The patient verbalized understanding of the proper use and possible adverse effects of sarecycline.  All of the patient's questions and concerns were addressed.
Cyclophosphamide Counseling:  I discussed with the patient the risks of cyclophosphamide including but not limited to hair loss, hormonal abnormalities, decreased fertility, abdominal pain, diarrhea, nausea and vomiting, bone marrow suppression and infection. The patient understands that monitoring is required while taking this medication.
Gabapentin Counseling: I discussed with the patient the risks of gabapentin including but not limited to dizziness, somnolence, fatigue and ataxia.
Doxepin Pregnancy And Lactation Text: This medication is Pregnancy Category C and it isn't known if it is safe during pregnancy. It is also excreted in breast milk and breast feeding isn't recommended.
Libtayo Pregnancy And Lactation Text: This medication is contraindicated in pregnancy and when breast feeding.
Imiquimod Counseling:  I discussed with the patient the risks of imiquimod including but not limited to erythema, scaling, itching, weeping, crusting, and pain.  Patient understands that the inflammatory response to imiquimod is variable from person to person and was educated regarded proper titration schedule.  If flu-like symptoms develop, patient knows to discontinue the medication and contact us.
Opzelura Pregnancy And Lactation Text: There is insufficient data to evaluate drug-associated risk for major birth defects, miscarriage, or other adverse maternal or fetal outcomes.  There is a pregnancy registry that monitors pregnancy outcomes in pregnant persons exposed to the medication during pregnancy.  It is unknown if this medication is excreted in breast milk.  Do not breastfeed during treatment and for about 4 weeks after the last dose.
Oxybutynin Counseling:  I discussed with the patient the risks of oxybutynin including but not limited to skin rash, drowsiness, dry mouth, difficulty urinating, and blurred vision.
Adbry Pregnancy And Lactation Text: It is unknown if this medication will adversely affect pregnancy or breast feeding.
Niacinamide Pregnancy And Lactation Text: These medications are considered safe during pregnancy.

## 2024-11-25 ENCOUNTER — HOSPITAL ENCOUNTER (OUTPATIENT)
Dept: LAB | Facility: MEDICAL CENTER | Age: 79
End: 2024-11-25
Attending: INTERNAL MEDICINE
Payer: MEDICARE

## 2024-11-25 LAB
T4 FREE SERPL-MCNC: 1.25 NG/DL (ref 0.93–1.7)
TSH SERPL-ACNC: 12.3 UIU/ML (ref 0.35–5.5)

## 2024-11-25 PROCEDURE — 86800 THYROGLOBULIN ANTIBODY: CPT

## 2024-11-25 PROCEDURE — 84443 ASSAY THYROID STIM HORMONE: CPT

## 2024-11-25 PROCEDURE — 36415 COLL VENOUS BLD VENIPUNCTURE: CPT

## 2024-11-25 PROCEDURE — 84432 ASSAY OF THYROGLOBULIN: CPT

## 2024-11-25 PROCEDURE — 84439 ASSAY OF FREE THYROXINE: CPT

## 2024-12-30 ENCOUNTER — HOSPITAL ENCOUNTER (OUTPATIENT)
Dept: LAB | Facility: MEDICAL CENTER | Age: 79
End: 2024-12-30
Attending: INTERNAL MEDICINE
Payer: MEDICARE

## 2024-12-30 LAB
ALBUMIN SERPL BCP-MCNC: 4.3 G/DL (ref 3.2–4.9)
ALBUMIN/GLOB SERPL: 1.9 G/DL
ALP SERPL-CCNC: 105 U/L (ref 30–99)
ALT SERPL-CCNC: 17 U/L (ref 2–50)
ANION GAP SERPL CALC-SCNC: 12 MMOL/L (ref 7–16)
AST SERPL-CCNC: 14 U/L (ref 12–45)
BASOPHILS # BLD AUTO: 0.4 % (ref 0–1.8)
BASOPHILS # BLD: 0.03 K/UL (ref 0–0.12)
BILIRUB SERPL-MCNC: 0.6 MG/DL (ref 0.1–1.5)
BUN SERPL-MCNC: 14 MG/DL (ref 8–22)
CALCIUM ALBUM COR SERPL-MCNC: 8.9 MG/DL (ref 8.5–10.5)
CALCIUM SERPL-MCNC: 9.1 MG/DL (ref 8.5–10.5)
CHLORIDE SERPL-SCNC: 106 MMOL/L (ref 96–112)
CO2 SERPL-SCNC: 25 MMOL/L (ref 20–33)
CREAT SERPL-MCNC: 0.44 MG/DL (ref 0.5–1.4)
CRP SERPL HS-MCNC: <0.3 MG/DL (ref 0–0.75)
EOSINOPHIL # BLD AUTO: 0.13 K/UL (ref 0–0.51)
EOSINOPHIL NFR BLD: 1.9 % (ref 0–6.9)
ERYTHROCYTE [DISTWIDTH] IN BLOOD BY AUTOMATED COUNT: 46 FL (ref 35.9–50)
ERYTHROCYTE [SEDIMENTATION RATE] IN BLOOD BY WESTERGREN METHOD: 8 MM/HOUR (ref 0–25)
GFR SERPLBLD CREATININE-BSD FMLA CKD-EPI: 98 ML/MIN/1.73 M 2
GLOBULIN SER CALC-MCNC: 2.3 G/DL (ref 1.9–3.5)
GLUCOSE SERPL-MCNC: 86 MG/DL (ref 65–99)
HCT VFR BLD AUTO: 40 % (ref 37–47)
HGB BLD-MCNC: 13.4 G/DL (ref 12–16)
IMM GRANULOCYTES # BLD AUTO: 0.02 K/UL (ref 0–0.11)
IMM GRANULOCYTES NFR BLD AUTO: 0.3 % (ref 0–0.9)
LYMPHOCYTES # BLD AUTO: 1.67 K/UL (ref 1–4.8)
LYMPHOCYTES NFR BLD: 24.5 % (ref 22–41)
MCH RBC QN AUTO: 31.2 PG (ref 27–33)
MCHC RBC AUTO-ENTMCNC: 33.5 G/DL (ref 32.2–35.5)
MCV RBC AUTO: 93.2 FL (ref 81.4–97.8)
MONOCYTES # BLD AUTO: 0.52 K/UL (ref 0–0.85)
MONOCYTES NFR BLD AUTO: 7.6 % (ref 0–13.4)
NEUTROPHILS # BLD AUTO: 4.46 K/UL (ref 1.82–7.42)
NEUTROPHILS NFR BLD: 65.3 % (ref 44–72)
NRBC # BLD AUTO: 0 K/UL
NRBC BLD-RTO: 0 /100 WBC (ref 0–0.2)
PLATELET # BLD AUTO: 285 K/UL (ref 164–446)
PMV BLD AUTO: 10 FL (ref 9–12.9)
POTASSIUM SERPL-SCNC: 4 MMOL/L (ref 3.6–5.5)
PROT SERPL-MCNC: 6.6 G/DL (ref 6–8.2)
RBC # BLD AUTO: 4.29 M/UL (ref 4.2–5.4)
SODIUM SERPL-SCNC: 143 MMOL/L (ref 135–145)
WBC # BLD AUTO: 6.8 K/UL (ref 4.8–10.8)

## 2024-12-30 PROCEDURE — 80053 COMPREHEN METABOLIC PANEL: CPT

## 2024-12-30 PROCEDURE — 85652 RBC SED RATE AUTOMATED: CPT

## 2024-12-30 PROCEDURE — 36415 COLL VENOUS BLD VENIPUNCTURE: CPT

## 2024-12-30 PROCEDURE — 85025 COMPLETE CBC W/AUTO DIFF WBC: CPT

## 2024-12-30 PROCEDURE — 83520 IMMUNOASSAY QUANT NOS NONAB: CPT

## 2024-12-30 PROCEDURE — 86140 C-REACTIVE PROTEIN: CPT

## 2024-12-30 PROCEDURE — 83088 ASSAY OF HISTAMINE: CPT

## 2025-01-01 LAB — TRYPTASE SERPL-MCNC: 5.6 UG/L

## 2025-01-03 ENCOUNTER — HOSPITAL ENCOUNTER (OUTPATIENT)
Dept: LAB | Facility: MEDICAL CENTER | Age: 80
End: 2025-01-03
Attending: PHYSICIAN ASSISTANT
Payer: MEDICARE

## 2025-01-03 LAB
HISTAMINE SERPL-SCNC: <8 NMOL/L (ref 0–8)
T4 FREE SERPL-MCNC: 1.51 NG/DL (ref 0.93–1.7)
TSH SERPL-ACNC: 2.31 UIU/ML (ref 0.35–5.5)

## 2025-01-03 PROCEDURE — 84443 ASSAY THYROID STIM HORMONE: CPT

## 2025-01-03 PROCEDURE — 36415 COLL VENOUS BLD VENIPUNCTURE: CPT

## 2025-01-03 PROCEDURE — 84439 ASSAY OF FREE THYROXINE: CPT

## 2025-01-30 ENCOUNTER — TELEPHONE (OUTPATIENT)
Dept: MEDICAL GROUP | Facility: MEDICAL CENTER | Age: 80
End: 2025-01-30
Payer: MEDICARE

## 2025-01-30 PROBLEM — R15.9 FECAL INCONTINENCE: Status: ACTIVE | Noted: 2025-01-30

## 2025-01-31 NOTE — TELEPHONE ENCOUNTER
Please call [facility], could they send us the pathology report from her colonoscopy done on January 17

## 2025-02-10 ENCOUNTER — HOSPITAL ENCOUNTER (OUTPATIENT)
Dept: LAB | Facility: MEDICAL CENTER | Age: 80
End: 2025-02-10
Attending: PHYSICIAN ASSISTANT
Payer: MEDICARE

## 2025-02-10 LAB
T4 FREE SERPL-MCNC: 1.49 NG/DL (ref 0.93–1.7)
TSH SERPL-ACNC: 2.4 UIU/ML (ref 0.35–5.5)

## 2025-02-10 PROCEDURE — 84439 ASSAY OF FREE THYROXINE: CPT

## 2025-02-10 PROCEDURE — 84443 ASSAY THYROID STIM HORMONE: CPT

## 2025-02-10 PROCEDURE — 36415 COLL VENOUS BLD VENIPUNCTURE: CPT

## 2025-02-15 SDOH — HEALTH STABILITY: PHYSICAL HEALTH: ON AVERAGE, HOW MANY DAYS PER WEEK DO YOU ENGAGE IN MODERATE TO STRENUOUS EXERCISE (LIKE A BRISK WALK)?: 0 DAYS

## 2025-02-15 SDOH — ECONOMIC STABILITY: FOOD INSECURITY: WITHIN THE PAST 12 MONTHS, THE FOOD YOU BOUGHT JUST DIDN'T LAST AND YOU DIDN'T HAVE MONEY TO GET MORE.: NEVER TRUE

## 2025-02-15 SDOH — HEALTH STABILITY: PHYSICAL HEALTH: ON AVERAGE, HOW MANY MINUTES DO YOU ENGAGE IN EXERCISE AT THIS LEVEL?: 0 MIN

## 2025-02-15 SDOH — ECONOMIC STABILITY: FOOD INSECURITY: WITHIN THE PAST 12 MONTHS, YOU WORRIED THAT YOUR FOOD WOULD RUN OUT BEFORE YOU GOT MONEY TO BUY MORE.: NEVER TRUE

## 2025-02-15 SDOH — ECONOMIC STABILITY: INCOME INSECURITY: HOW HARD IS IT FOR YOU TO PAY FOR THE VERY BASICS LIKE FOOD, HOUSING, MEDICAL CARE, AND HEATING?: NOT HARD AT ALL

## 2025-02-15 SDOH — ECONOMIC STABILITY: INCOME INSECURITY: IN THE LAST 12 MONTHS, WAS THERE A TIME WHEN YOU WERE NOT ABLE TO PAY THE MORTGAGE OR RENT ON TIME?: NO

## 2025-02-15 SDOH — HEALTH STABILITY: MENTAL HEALTH
STRESS IS WHEN SOMEONE FEELS TENSE, NERVOUS, ANXIOUS, OR CAN'T SLEEP AT NIGHT BECAUSE THEIR MIND IS TROUBLED. HOW STRESSED ARE YOU?: RATHER MUCH

## 2025-02-15 ASSESSMENT — SOCIAL DETERMINANTS OF HEALTH (SDOH)
HOW OFTEN DO YOU HAVE SIX OR MORE DRINKS ON ONE OCCASION: NEVER
HOW OFTEN DO YOU ATTENT MEETINGS OF THE CLUB OR ORGANIZATION YOU BELONG TO?: MORE THAN 4 TIMES PER YEAR
HOW OFTEN DO YOU ATTEND CHURCH OR RELIGIOUS SERVICES?: MORE THAN 4 TIMES PER YEAR
WITHIN THE PAST 12 MONTHS, YOU WORRIED THAT YOUR FOOD WOULD RUN OUT BEFORE YOU GOT THE MONEY TO BUY MORE: NEVER TRUE
DO YOU BELONG TO ANY CLUBS OR ORGANIZATIONS SUCH AS CHURCH GROUPS UNIONS, FRATERNAL OR ATHLETIC GROUPS, OR SCHOOL GROUPS?: YES
HOW OFTEN DO YOU GET TOGETHER WITH FRIENDS OR RELATIVES?: TWICE A WEEK
HOW OFTEN DO YOU ATTEND CHURCH OR RELIGIOUS SERVICES?: MORE THAN 4 TIMES PER YEAR
IN A TYPICAL WEEK, HOW MANY TIMES DO YOU TALK ON THE PHONE WITH FAMILY, FRIENDS, OR NEIGHBORS?: TWICE A WEEK
DO YOU BELONG TO ANY CLUBS OR ORGANIZATIONS SUCH AS CHURCH GROUPS UNIONS, FRATERNAL OR ATHLETIC GROUPS, OR SCHOOL GROUPS?: YES
HOW OFTEN DO YOU GET TOGETHER WITH FRIENDS OR RELATIVES?: TWICE A WEEK
HOW OFTEN DO YOU HAVE A DRINK CONTAINING ALCOHOL: 2-3 TIMES A WEEK
HOW MANY DRINKS CONTAINING ALCOHOL DO YOU HAVE ON A TYPICAL DAY WHEN YOU ARE DRINKING: 1 OR 2
HOW HARD IS IT FOR YOU TO PAY FOR THE VERY BASICS LIKE FOOD, HOUSING, MEDICAL CARE, AND HEATING?: NOT HARD AT ALL
IN THE PAST 12 MONTHS, HAS THE ELECTRIC, GAS, OIL, OR WATER COMPANY THREATENED TO SHUT OFF SERVICE IN YOUR HOME?: NO
HOW OFTEN DO YOU ATTENT MEETINGS OF THE CLUB OR ORGANIZATION YOU BELONG TO?: MORE THAN 4 TIMES PER YEAR
IN A TYPICAL WEEK, HOW MANY TIMES DO YOU TALK ON THE PHONE WITH FAMILY, FRIENDS, OR NEIGHBORS?: TWICE A WEEK

## 2025-02-15 ASSESSMENT — LIFESTYLE VARIABLES
HOW OFTEN DO YOU HAVE SIX OR MORE DRINKS ON ONE OCCASION: NEVER
AUDIT-C TOTAL SCORE: 3
SKIP TO QUESTIONS 9-10: 1
HOW OFTEN DO YOU HAVE A DRINK CONTAINING ALCOHOL: 2-3 TIMES A WEEK
HOW MANY STANDARD DRINKS CONTAINING ALCOHOL DO YOU HAVE ON A TYPICAL DAY: 1 OR 2

## 2025-02-21 ENCOUNTER — APPOINTMENT (OUTPATIENT)
Dept: MEDICAL GROUP | Facility: MEDICAL CENTER | Age: 80
End: 2025-02-21
Payer: MEDICARE

## 2025-02-21 ENCOUNTER — TELEPHONE (OUTPATIENT)
Dept: MEDICAL GROUP | Facility: MEDICAL CENTER | Age: 80
End: 2025-02-21

## 2025-02-21 VITALS
OXYGEN SATURATION: 97 % | TEMPERATURE: 97 F | SYSTOLIC BLOOD PRESSURE: 118 MMHG | HEART RATE: 70 BPM | BODY MASS INDEX: 25.78 KG/M2 | HEIGHT: 63 IN | DIASTOLIC BLOOD PRESSURE: 60 MMHG | WEIGHT: 145.5 LBS

## 2025-02-21 DIAGNOSIS — Z85.850 HISTORY OF THYROID CANCER: Chronic | ICD-10-CM

## 2025-02-21 DIAGNOSIS — R31.29 MICROSCOPIC HEMATURIA: ICD-10-CM

## 2025-02-21 DIAGNOSIS — R73.09 IMPAIRED GLUCOSE METABOLISM: ICD-10-CM

## 2025-02-21 DIAGNOSIS — E78.5 DYSLIPIDEMIA: ICD-10-CM

## 2025-02-21 DIAGNOSIS — Z12.31 ENCOUNTER FOR SCREENING MAMMOGRAM FOR BREAST CANCER: ICD-10-CM

## 2025-02-21 DIAGNOSIS — Z00.00 MEDICARE ANNUAL WELLNESS VISIT, SUBSEQUENT: ICD-10-CM

## 2025-02-21 DIAGNOSIS — F32.5 MAJOR DEPRESSIVE DISORDER, SINGLE EPISODE, IN FULL REMISSION (HCC): ICD-10-CM

## 2025-02-21 DIAGNOSIS — E55.9 VITAMIN D DEFICIENCY: ICD-10-CM

## 2025-02-21 DIAGNOSIS — Z00.00 PREVENTATIVE HEALTH CARE: Chronic | ICD-10-CM

## 2025-02-21 DIAGNOSIS — M81.0 POSTMENOPAUSAL BONE LOSS: ICD-10-CM

## 2025-02-21 PROCEDURE — G0439 PPPS, SUBSEQ VISIT: HCPCS | Performed by: INTERNAL MEDICINE

## 2025-02-21 PROCEDURE — 3078F DIAST BP <80 MM HG: CPT | Performed by: INTERNAL MEDICINE

## 2025-02-21 PROCEDURE — 3074F SYST BP LT 130 MM HG: CPT | Performed by: INTERNAL MEDICINE

## 2025-02-21 RX ORDER — ESTRADIOL 0.1 MG/G
CREAM VAGINAL
COMMUNITY
Start: 2025-02-07

## 2025-02-21 ASSESSMENT — ACTIVITIES OF DAILY LIVING (ADL): BATHING_REQUIRES_ASSISTANCE: 0

## 2025-02-21 ASSESSMENT — FIBROSIS 4 INDEX: FIB4 SCORE: 0.94

## 2025-02-21 ASSESSMENT — PATIENT HEALTH QUESTIONNAIRE - PHQ9
5. POOR APPETITE OR OVEREATING: 0 - NOT AT ALL
SUM OF ALL RESPONSES TO PHQ QUESTIONS 1-9: 3
CLINICAL INTERPRETATION OF PHQ2 SCORE: 1

## 2025-02-21 ASSESSMENT — ENCOUNTER SYMPTOMS: GENERAL WELL-BEING: GOOD

## 2025-02-21 NOTE — LETTER
[facility]  Asael Redd M.D.  [address]  [address]  [place] [address]  Fax: [phone]   Authorization for Release/Disclosure of   Protected Health Information   Name: PING MAYS : 1945 SSN: [id number]   Address: [facility] [place] [address] Phone:    [phone] (home)    I authorize the entity listed below to release/disclose the PHI below to:   [facility]/Asael Redd M.D. and Asael Redd M.D.   Provider or Entity Name:  Dr. Ventura    [facility]   Phone:      Fax: [phone]      Reason for request: continuity of care   Information to be released:    [  ] LAST COLONOSCOPY,  including any PATH REPORT and follow-up  [  ] LAST FIT/COLOGUARD RESULT [  ] LAST DEXA  [  ] LAST MAMMOGRAM  [  ] LAST PAP  [  ] LAST LABS [  ] RETINA EXAM REPORT  [  ] IMMUNIZATION RECORDS  [  X] Release last 12 months       [  ] Check here and initial the line next to each item to release ALL health information INCLUDING  _____ Care and treatment for drug and / or alcohol abuse  _____ HIV testing, infection status, or AIDS  _____ Genetic Testing    DATES OF SERVICE OR TIME PERIOD TO BE DISCLOSED: _____________  I understand and acknowledge that:  * This Authorization may be revoked at any time by you in writing, except if your health information has already been used or disclosed.  * Your health information that will be used or disclosed as a result of you signing this authorization could be re-disclosed by the recipient. If this occurs, your re-disclosed health information may no longer be protected by State or Federal laws.  * You may refuse to sign this Authorization. Your refusal will not affect your ability to obtain treatment.  * This Authorization becomes effective upon signing and will  on (date) __________.      If no date is indicated, this Authorization will  one (1) year from the signature date.    Name: Ping Mays  Signature: Date:   2025      PLEASE FAX REQUESTED RECORDS BACK TO: [phone]

## 2025-02-21 NOTE — PROGRESS NOTES
Chief Complaint   Patient presents with    Annual Exam       HPI:  Ping Das is a 79 y.o. here for Medicare Annual Wellness Visit     Patient Active Problem List    Diagnosis Date Noted    Fecal incontinence 01/30/2025    Low back pain 02/22/2024    Knee arthritis 02/11/2024    History of COVID-19 08/11/2023    Sensorineural hearing loss 04/06/2023    Schatzki's ring 08/25/2021    Allergic rhinitis 11/02/2020    Pulmonary nodules benign 07/08/2020    Elevated coronary artery calcium score 06/19/2020    Skin lesion 01/06/2020    Impaired glucose metabolism 10/19/2018    Microscopic hematuria 11/03/2017    History of HSV infection 05/10/2016    Cough 03/07/2016    History of compression fracture of spine 03/07/2016    Ocular migraine 01/24/2013    Osteopenia 07/06/2011    History of thyroid cancer 05/15/2009    Dyslipidemia 05/15/2009    History of depression 05/15/2009    Adenoma of colon 05/15/2009    Preventative health care 05/15/2009       Current Outpatient Medications   Medication Sig Dispense Refill    estradiol (ESTRACE) 0.1 MG/GM vaginal cream APPLY 1 GRAM VAGINALLY WITH FINGER TWICE WEEKLY AT BEDTIME      ezetimibe (ZETIA) 10 MG Tab Take 1 Tablet by mouth every day. 90 Tablet 1    potassium chloride ER (KLOR-CON) 10 MEQ tablet Take 1 tablet by mouth once daily 90 Tablet 2    atorvastatin (LIPITOR) 80 MG tablet Take 1 tablet by mouth once daily 90 Tablet 2    fluticasone (FLONASE) 50 MCG/ACT nasal spray Use 2 spray(s) in each nostril once daily 48 g 3    pantoprazole (PROTONIX) 20 MG tablet Take 1 tablet by mouth once daily 180 Tablet 3    levothyroxine (SYNTHROID) 75 MCG Tab Take 1 Tablet by mouth every morning on an empty stomach. 1 Tablet 0    potassium chloride SA (K-DUR) 10 MEQ Tab CR TAKE ONE TABLET BY MOUTH DAILY 90 Tablet 3    naproxen (NAPROSYN) 500 MG Tab Take 1 Tablet by mouth 2 times a day as needed (pain). 60 Tablet 2    fexofenadine (ALLEGRA) 180 MG tablet       melatonin 5 mg Tab        ipratropium (ATROVENT) 0.03 % Solution PUT 1 TO 2 SPRAYS INTO EACH NOSTRIL ONE TO TWO TIMES DAILY      Calcium Polycarbophil (FIBER-CAPS PO) Take  by mouth.      BIOTIN PO Take  by mouth.      azelastine (ASTELIN) 137 MCG/SPRAY nasal spray Administer 1 Spray into affected nostril(S) 2 times a day.      Multiple Vitamin (MULTI-DAY PO) Take  by mouth.      Calcium Carbonate-Vitamin D (CALCIUM 600/VITAMIN D PO) Take  by mouth. Three times a week      cyanocobalamin (VITAMIN B-12) 500 MCG Tab Take 1 Tablet by mouth every day.      Cholecalciferol (VITAMIN D3 PO) Take  by mouth.      Glucosamine HCl (GLUCOSAMINE PO) Take  by mouth.      Omega-3 Fatty Acids (OMEGA 3 PO) Take  by mouth.      acyclovir (ZOVIRAX) 400 MG tablet Take 1 Tablet by mouth 2 times a day.       No current facility-administered medications for this visit.          Current supplements as per medication list.     Allergies: Kenalog and Other environmental    Current social contact/activities: ***     She  reports that she has never smoked. She has been exposed to tobacco smoke. She has never used smokeless tobacco. She reports current alcohol use of about 1.8 - 2.4 oz of alcohol per week. She reports that she does not use drugs.  Counseling given: Not Answered  Tobacco comments: Parents smoked/ smoked/worked in [employer]      ROS:    Gait: Uses no assistive device  Ostomy: No  Other tubes: No  Amputations: No  Chronic oxygen use: No  Last eye exam:2 months ago   Wears hearing aids: No   : Reports urinary leakage during the last 6 months that has somewhat interfered with their daily activities or sleep.    Screening:    Depression Screening  Little interest or pleasure in doing things?  0 - not at all  Feeling down, depressed , or hopeless? 1 - several days  Trouble falling or staying asleep, or sleeping too much?  2 - more than half the days  Feeling tired or having little energy?  0 - not at all  Poor appetite or overeating?  0 - not at  all  Feeling bad about yourself - or that you are a failure or have let yourself or your family down? 0 - not at all  Trouble concentrating on things, such as reading the newspaper or watching television? 0 - not at all  Moving or speaking so slowly that other people could have noticed.  Or the opposite - being so fidgety or restless that you have been moving around a lot more than usual?  0 - not at all  Thoughts that you would be better off dead, or of hurting yourself?  0 - not at all  Patient Health Questionnaire Score: 3    If depressive symptoms identified deferred to follow up visit unless specifically addressed in assessment and plan.    Interpretation of PHQ-9 Total Score   Score Severity   1-4 No Depression   5-9 Mild Depression   10-14 Moderate Depression   15-19 Moderately Severe Depression   20-27 Severe Depression    Screening for Cognitive Impairment  Do you or any of your friends or family members have any concern about your memory? No  Three Minute Recall (Village, Kitchen, Baby) 3/3    Augusto clock face with all 12 numbers and set the hands to show 10 minutes past 11.  Yes    Cognitive concerns identified deferred for follow up unless specifically addressed in assessment and plan.    Fall Risk Assessment  Has the patient had two or more falls in the last year or any fall with injury in the last year?  No    Safety Assessment  Do you always wear your seatbelt?  Yes  Any changes to home needed to function safely? No  Difficulty hearing.  No  Patient counseled about all safety risks that were identified.    Functional Assessment ADLs  Are there any barriers preventing you from cooking for yourself or meeting nutritional needs?  No.    Are there any barriers preventing you from driving safely or obtaining transportation?  No.    Are there any barriers preventing you from using a telephone or calling for help?  No    Are there any barriers preventing you from shopping?  No.    Are there any barriers  preventing you from taking care of your own finances?  No    Are there any barriers preventing you from managing your medications?  No    Are there any barriers preventing you from showering, bathing or dressing yourself? No    Are there any barriers preventing you from doing housework or laundry? No    Are there any barriers preventing you from using the toilet?No    Are you currently engaging in any exercise or physical activity?  Yes.      Self-Assessment of Health  What is your perception of your health? Good    Do you sleep more than six hours a night? No    In the past 7 days, how much did pain keep you from doing your normal work? None    Do you spend quality time with family or friends (virtually or in person)? Yes    Do you usually eat a heart healthy diet that constists of a variety of fruits, vegetables, whole grains and fiber? No    Do you eat foods high in fat and/or Fast Food more than three times per week? No    How concerned are you that your medical conditions are not being well managed? Not at all    Are you worried that in the next 2 months, you may not have stable housing that you own, rent, or stay in as part of a household? No        Advance Care Planning  Do you have an Advance Directive, Living Will, Durable Power of , or POLST? Yes  Advance Directive       is on file      Health Maintenance Summary            Current Care Gaps       Annual Wellness Visit (Yearly) Overdue since 2/15/2025      02/15/2024  Visit Dx: Medicare annual wellness visit, subsequent    02/13/2023  Visit Dx: Medicare annual wellness visit, subsequent    12/07/2021  Visit Dx: Medicare annual wellness visit, subsequent    01/06/2020  Visit Dx: Medicare annual wellness visit, subsequent    10/11/2018  Visit Dx: Medicare annual wellness visit, subsequent     Only the first 5 history entries have been loaded, but more history exists.                    Upcoming       Bone Density Scan (Every 5 Years) Next due on  2/17/2028 02/17/2023  DS-BONE DENSITY STUDY (DEXA)    06/17/2020  DS-BONE DENSITY STUDY (DEXA)    09/05/2017  DS-BONE DENSITY STUDY (DEXA)    06/12/2015  DS-BONE DENSITY STUDY (DEXA)    07/06/2011  DS-BONE DENSITY STUDY (DEXA)      Only the first 5 history entries have been loaded, but more history exists.              Colorectal Cancer Screening (Colonoscopy - Every 5 Years) Next due on 2/6/2030 02/06/2025  COLONOSCOPY RESULTS    01/17/2025  COLONOSCOPY RESULTS    01/17/2025  COLONOSCOPY RESULTS    09/06/2021  REFERRAL TO GI FOR COLONOSCOPY    08/25/2021  REFERRAL TO GI FOR COLONOSCOPY      Only the first 5 history entries have been loaded, but more history exists.              IMM DTaP/Tdap/Td Vaccine (3 - Td or Tdap) Next due on 2/17/2033 02/17/2023  Imm Admin: Tdap Vaccine    01/19/2012  Imm Admin: Tdap Vaccine                      Completed or No Longer Recommended       Influenza Vaccine (Series Information) Completed      10/19/2024  Imm Admin: Influenza high-dose trivalent (PF)    09/12/2023  Imm Admin: Influenza Vaccine Adult HD    10/07/2022  Imm Admin: Influenza Vaccine Adult HD    09/28/2021  Imm Admin: Influenza Vaccine Adult HD    09/04/2020  Imm Admin: Influenza, Unspecified - HISTORICAL DATA      Only the first 5 history entries have been loaded, but more history exists.              Zoster (Shingles) Vaccines (Series Information) Completed      02/05/2020  Imm Admin: Zoster Vaccine Recombinant (RZV) (SHINGRIX)    02/05/2020  Imm Admin: Zoster Vaccine Recombinant (RZV) (SHINGRIX)    11/13/2019  Imm Admin: Zoster Vaccine Recombinant (RZV) (SHINGRIX)    03/04/2014  Imm Admin: Zoster Vaccine Live (ZVL) (Zostavax) - HISTORICAL DATA              COVID-19 Vaccine (Series Information) Completed      10/31/2024  Imm Admin: COVID-19, mRNA, LNP-S, PF, daisy-sucrose, 30 mcg/0.3 mL    11/02/2023  Imm Admin: Covid-19 Mrna (Spikevax) Moderna 12+ Years    10/01/2022  Imm Admin: PFIZER BIVALENT  SARS-COV-2 VACCINE (12+)    04/08/2022  Imm Admin: MODERNA SARS-COV-2 VACCINE (12+)    10/11/2021  Imm Admin: PFIZER PURPLE CAP SARS-COV-2 VACCINATION (12+)      Only the first 5 history entries have been loaded, but more history exists.              Hepatitis C Screening  Completed      02/11/2024  Done    06/11/2020  Hepatitis C Antibody component of HEP C VIRUS ANTIBODY              Pneumococcal Vaccine: 50+ Years (Series Information) Completed      02/15/2024  Imm Admin: Pneumococcal Conjugate Vaccine (PCV20)    02/05/2015  Imm Admin: Pneumococcal Conjugate Vaccine (Prevnar/PCV-13)    01/19/2012  Imm Admin: Pneumococcal polysaccharide vaccine (PPSV-23)              Hepatitis A Vaccine (Hep A) (Series Information) Aged Out      No completion history exists for this topic.              Hepatitis B Vaccine (Hep B) (Series Information) Aged Out     No completion history exists for this topic.              HPV Vaccines (Series Information) Aged Out     No completion history exists for this topic.              Polio Vaccine (Inactivated Polio) (Series Information) Aged Out     No completion history exists for this topic.              Meningococcal Immunization (Series Information) Aged Out     No completion history exists for this topic.              Mammogram  Discontinued        Frequency changed to Never automatically (Topic No Longer Applies)    02/22/2024  MA-SCREENING MAMMO BILAT W/TOMOSYNTHESIS W/CAD    02/14/2023  MA-SCREENING MAMMO BILAT W/TOMOSYNTHESIS W/CAD    01/04/2022  MA-SCREENING MAMMO BILAT W/TOMOSYNTHESIS W/CAD    06/17/2020  MA-SCREENING MAMMO BILAT W/TOMOSYNTHESIS W/CAD     Only the first 5 history entries have been loaded, but more history exists.            Cervical Cancer Screening  Discontinued        Frequency changed to Never automatically (Topic No Longer Applies)    11/27/2018  THINPREP PAP, REFLEX HPV ON ASC-US AND ABOVE    11/27/2018  PATHOLOGY GYN SPECIMEN    06/21/2016  THINPREP PAP,  REFLEX HPV ON ASC-US AND ABOVE    06/21/2016  PATHOLOGY GYN SPECIMEN      Only the first 5 history entries have been loaded, but more history exists.                            Patient Care Team:  Asael Redd M.D. as PCP - General  Jeimy Huntley M.D. as Consulting Physician (OB & Gyn - Gynecology)  Carolyn Ventura M.D. as Consulting Physician (Endocrinology)  Mejia Saez O.D. as Consulting Physician (Optometry)  Charu Spann M.D. as Consulting Physician (Dermatology)  Jose Alejandro Godinez M.D. as Consulting Physician (Ophthalmology)  Viraj Rodriguez M.D. (Inactive) as Consulting Physician (Ophthalmology)  Tracey Ramon M.D. as Consulting Physician (Ophthalmology)      Social History     Tobacco Use    Smoking status: Never     Passive exposure: Past    Smokeless tobacco: Never    Tobacco comments:     Parents smoked/ smoked/worked in casinos   Vaping Use    Vaping status: Never Used   Substance Use Topics    Alcohol use: Yes     Alcohol/week: 1.8 - 2.4 oz     Types: 3 - 4 Glasses of wine per week    Drug use: No     Family History   Problem Relation Age of Onset    Cancer Mother     Heart Disease Father     Heart Disease Sister     Cancer Daughter      She  has a past medical history of Anesthesia, Cancer (HCC), CATARACT, Colon polyp (5/15/2009), Cough, Depression (5/15/2009), Dyslipidemia (5/15/2009), Hypothyroid (5/15/2009), Hypovitaminosis D (5/15/2009), Palpitations (5/15/2009), Papillary carcinoma of thyroid (HCC) (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    She has no past medical history of Painful breathing or Wheezing.   Past Surgical History:   Procedure Laterality Date    VITRECTOMY POSTERIOR  11/17/2011    Performed by TRACEY RAMON at [facility]    OTHER  August 2007    thyroid    OTHER  Nov 2006    cataract    OTHER  July 2006    Skin /face       Exam:   There were no vitals taken  for this visit. There is no height or weight on file to calculate BMI.    Hearing good.    Dentition good  Alert, oriented in no acute distress.  Eye contact is good, speech goal directed, affect calm    Assessment and Plan. The following treatment and monitoring plan is recommended:    There are no diagnoses linked to this encounter.    Services suggested: No services needed at this time  Health Care Screening: Age-appropriate preventive services recommended by USPTF and ACIP covered by Medicare were discussed today. Services ordered if indicated and agreed upon by the patient.  Referrals offered: Community-based lifestyle interventions to reduce health risks and promote self-management and wellness, fall prevention, nutrition, physical activity, tobacco-use cessation, weight loss, and mental health services as per orders if indicated.    Discussion today about general wellness and lifestyle habits:    Prevent falls and reduce trip hazards; Cautioned about securing or removing rugs.  Have a working fire alarm and carbon monoxide detector;   Engage in regular physical activity and social activities     Follow-up: No follow-ups on file.

## 2025-02-21 NOTE — LETTER
[facility]  Asael Redd M.D.  [address]  [address]  [place] [address]  Fax: [phone]   Authorization for Release/Disclosure of   Protected Health Information   Name: PING MAYS : 1945 SSN: [id number]   Address: [facility] [place] [address] Phone:    [phone] (home)    I authorize the entity listed below to release/disclose the PHI below to:   [facility]/Asael Redd M.D. and Asael Redd M.D.   Provider or Entity Name:  [facility]   Address   City, State, [facility]   Phone:      Fax:[phone]     Reason for request: continuity of care   Information to be released:    [  ] LAST COLONOSCOPY,  including any PATH REPORT and follow-up  [  ] LAST FIT/COLOGUARD RESULT [  ] LAST DEXA  [  ] LAST MAMMOGRAM  [  ] LAST PAP  [  ] LAST LABS [  ] RETINA EXAM REPORT  [  ] IMMUNIZATION RECORDS  [ X ] Release all info      [  ] Check here and initial the line next to each item to release ALL health information INCLUDING  _____ Care and treatment for drug and / or alcohol abuse  _____ HIV testing, infection status, or AIDS  _____ Genetic Testing    DATES OF SERVICE OR TIME PERIOD TO BE DISCLOSED: _____________  I understand and acknowledge that:  * This Authorization may be revoked at any time by you in writing, except if your health information has already been used or disclosed.  * Your health information that will be used or disclosed as a result of you signing this authorization could be re-disclosed by the recipient. If this occurs, your re-disclosed health information may no longer be protected by State or Federal laws.  * You may refuse to sign this Authorization. Your refusal will not affect your ability to obtain treatment.  * This Authorization becomes effective upon signing and will  on (date) __________.      If no date is indicated, this Authorization will  one (1) year from the signature date.    Name: Ping Mays  Signature: Date:   2025     PLEASE FAX REQUESTED  RECORDS BACK TO: [phone]

## 2025-02-21 NOTE — PROGRESS NOTES
Subjective     Ping Das is a 79 y.o. female who presents with  medicare wellness           HPI          Medicare wellness assessment  Current supplements: Reviewed  Chronic narcotic pain medicines: No  Allergies:  reviewed  Sees gyn dr.ernst LION   Sees endocrine  on synthroid 75 mcg  Sees  allergy chronic itching  Sees skin cancer dermatology [place]  Sees optic gallery and  for cataract  Screening: Reviewed  Depressive Symptoms: Denies feeling down, depressed or hopeless. Denies loss of interest or pleasure in doing things   ADLs: Denies needing help with using telephone, transportation, shopping, preparing meals, housework, laundry, or managing medication or money.    Independent with bathing, hygiene, feeding, toileting, dressing    Memory concerns: Denies difficulty remembering details of conversations, events and upcoming appointments.  Hearing problems: Denies.   Recent falls no  Current social contact/activities: Reviewed  Not exercising on a regular basis   Eats one healthy meal per day at 2 pm, for breakfast multi grain bread and coffee, and for dinner popcorn, cheese, perhaps snacks on some chips at the end of the day  She reports that she has never smoked. She has been exposed to tobacco smoke. She has never used smokeless tobacco. She reports current alcohol use of about 1.8 - 2.4 oz of alcohol per week. She reports that she does not use drugs.  Counseling given: Not Answered  Tobacco comments: Parents smoked/ smoked/worked in [place]        ROS:    Gait: Uses no assistive device  Ostomy: No  Other tubes: No  Amputations: No  Chronic oxygen use: No  Last eye exam:2 months ago   Wears hearing aids: No   : Reports urinary leakage during the last 6 months that has somewhat interfered with their daily activities or sleep.    Patient Active Problem List   Diagnosis    History of thyroid cancer    Dyslipidemia    History of depression    Adenoma of colon     Preventative health care    Osteopenia    Ocular migraine    Cough    History of compression fracture of spine    History of HSV infection    Microscopic hematuria    Impaired glucose metabolism    Skin lesion    Elevated coronary artery calcium score    Pulmonary nodules benign    Allergic rhinitis    Schatzki's ring    Sensorineural hearing loss    History of COVID-19    Knee arthritis    Low back pain    Fecal incontinence       skin lesion   9/25/17  skin cancer dermatology   9/12/18  skin cancer dermatology   6/8/19  skin cancer dermatology   12/16/19  skin cancer dermatology basal cell carcinoma left upper back  6/15/20  skin cancer dermatology   2/24/21  skin cancer dermatology   9/15/21  skin cancer dermatology basal cell carcinoma right medial distal pretibial region  11/10/21  skin cancer dermatology basal cell carcinoma right medial distal pretibial region  3/16/22  skin cancer dermatology   8/31/22  skin cancer dermatology   3/10/23  skin cancer dermatology   9/6/23  skin cancer dermatology basal cell carcinoma left central neck     sensorineural hearing loss  3/28/23  audiology note bilateral sensorineural hearing loss only at very high frequencies otherwise normal     schatzki ring  8/25/21 EGD per [facility] noted schatzki's ring 18 mm balloon dilation, small hiatal hernia, biopsies negative     pulmonary nodules  6/17/20 CT high-resolution thorax no evidence of interstitial thickening or honeycombing, scattered cystic lucencies, consideration lymphocytic interstitial pneumonitis, emphysema, lymphangiomyomatosis, focal groundglass opacities upper lobes, coronary calcifications  10/12/20 CT thorax stable 5 to 6 mm biapical groundglass nodules, no new nodules hyperinflated lungs, atherosclerosis with coronary calcification  5/27/21 CT thorax without, stable less than 6 mm biapical groundglass nodules,  emphysematous changes lungs  7/2/21 pulmonary note symptoms improved with singulair but she was not able to tolerate this medication, she also does see allergy and will continue with intranasal therapy, repeat CT thorax in 1 year, follow-up 6 months   7/2/22 CT thorax without, stable biapical 5 to 6 mm groundglass lung nodules, underlying emphysema  11/10/22  pulmonary note chronic cough, presumed allergic, controlled at present, pulmonary nodule stable x2 years no indication for continued surveillance  8/17/23 chest x-ray negative     Preventative health  4/7/19 YOLANDA negative, EKG sinus, ultrasound aorta mild atherosclerotic changes, ultrasound carotid mild left plaque bulb done at [facility]  6/11/20 hep c ab negative   2/5/20 shingrix second done  2/14/23 dexa LS-1.5,hip-2.0  2/17/23 tdap  9/12/23 rsv  2/15/24 prevnar 20  2/23/24 mammogram heterogeneously dense breast tissue recommend screening breast ultrasound  3/5/24 vit d 58  3/20/24 sonocine  10/19/24 flu  10/31/24 covid   1/17/25 colon per [facility] hyperplastic polyps, moderate diverticulosis,follow up 5 years to discuss benefits of colonoscopy     Osteopenia  10/08 dexa LS -1.0, hip -0.4  7/11 dexa LS -0.5,hip -1.2  2/13 vit d 50  3/15/14 vit d 31  3/25/15 vit d 33  6/12/15 dexa LS -0.6,hip -1.3;FRAX 10.5% major,hip 1.7%  3/7/16 cxr mid and upper thoracic spine compression fracture  5/17/16 vit d 29 take extra 2000 units daily  9/5/17 dexa LS-0.7,hip-1.2  9/5/17 vit d 42  6/11/20 vit d 44  6/18/20 dexa LS-0.6,hip-1.4  2/10/22 vit d 43  2/14/23 dexa LS-1.5,hip-2.0  2/23/23 vit d 58  3/5/24 vit d 58     Ocular migraine     Microscopic hematuria  9/5/17 UA 2-5 RBC  9/7/17 urine culture negative, repeat UA 2 weeks, order in system  9/27/17 UA 2-5 RBC, will order us renal  10/4/17 ultrasound renal negative repeat urinalysis 2 weeks, if still positive will check CT urogram and urology evaluation  10/31/17 UA no RBC urine culture negative  2/12/23 UA 0-2  RBC  3/5/24 UA 0-2 RBC    low back pain   2/22/24 x-ray thoracic spine chronic T11 compression fracture, minimal thoracic spondylosis  2/22/24 x-ray lumbar spine mild lumbar spondylosis no fracture, levocurvature    knee arthritis  10/13/23 x-ray right knee degenerative changes bilateral most notable medial compartments, chondrocalcinosis noted bilateral  10/13/23 KATIE note right knee pain, celestone 2 cc steroid injection performed     Impaired glucose metabolism  10/19/18 A1c 6.2%  6/11/20 A1c 5.8%  2/10/22 A1c 6.1%  2/21/23 A1c 6.0%  3/5/24 A1c 5.9%     HSV acyclovir as needed     History papillary thyroid cancer  8/07 s/p thyroidectomy 1.1 cm no evid of adenopathy sees   3/11 dr.abbott saunders note u/s thyroid in june, on levoxyl 100 mg  6/11 tsh 0.33, on levoxyl 100 mcg  1/12 tsh 3.9 on levoxyl 100 mcg followed by   8/12 dr.abbott saunders note, tsh 0.05,free t4 1.46, thyroglobulin <0.9, decrease levothyroxine to 100 mcg. Repeat thyroid ultrasound February 1/13 tsh 0.1, thyroglobulin less than 0.1; dr.abbott saunders note no changes  1/29/14 tsh 0.17,free t4 1.0,thyroglobulin <0.1 on levothyroxine 100 mcg  2/25/14 tsh 0.12, free t4, free t4 1.2,thyroglobulin 0.1 on levothyroxine 100 mcg  9/5/14 tsh 0.13,free t4 1.1 on levothyroxine 100 mcg one pill six days per week ,1/2 pill on day 7  9/20/14  endocrine note  1/6/15 tsh 1.5,free t4 0.99 on levothyroxine 100 mcg 6 days per week and 75 mcg one day per week  1/13/15  endocrine note  3/24/15 tsh 0.3,free t4 1.5 on levothyroxine 100 mcg 6 days per week  4/2/15  endocrine note continue same dose levothyroxine and follow up 6 months  9/30/15 tsh 0.09,free t4 1.3, thyroglobulin<0.1, antithyroglobulin antibody less<0.9; on synthroid 100 mcg 6 days a week, synthroid 75 mcg one day per week  10/6/15  endocrinology note decrease total thyroid dose by 25 mcg during the week  5/17/16 tsh 0.6,free t4 0.9 on synthroid 100 mcg 6 days  per week and 50 mcg 1 day per week? Per  endocrinology  5/15/17 tsh 1.1,free t4 1.1 on levothyroxine 100 mcg six days per week  5/18/17  endocrine note change to levothyroxine 75 mcg daily   11/27/17 tsh 0.15,free t4 1.26, thyroglbulin< 0.1, antithyroglobulin < 0.9  followed by  endocrinology  1/19/18 tsh 0.9 on levothyroxine per  endocrinology  4/17/18 tsh 0.7, free t4 1.2 on levothyroxine per  endocrinology  10/18/18 tsh 0.3,free t4 1.2,on levothyroxine 100 mcg six days and 1/2 tab day 7  10/24/18  endocrine note change levothyroxine to half a tablet twice a week, full tablet 5 days a week, recheck labs after  1/23/19 tsh 1.6,free t4 0.98, thyroglobulin less than 0.1, antithyroglobulin less than 0.9 per endocrinology  6/21/19 tsh 0.37, free t4 1.13 on levothyroxine 100 mcg  half a tablet twice a week, full tablet 5 days a week per  endocrinology  10/16/19 tsh 0.4, free t4 1.3    12/30/19 tsh 0.6,free t4 1.02 on levothyroxine 88 mcg six days per week and 1/2 tab one day per week per endocrine   1/6/20  endocrine note continue on levothyroxine 88 mcg six days per week and 1/2 tab one day per week  7/10/20 leonid kang endocrine note with , tsh 1.9, free t4 1.3, thyroglobulin<0.1, thyroglobulin antibody<0.9, on levothyroxine 88 mcg six days per week and 1/2 tab one day per week, change to levothyroxine 75 mcg daily  10/7/20 tsh 0.13,free t4 1.5 per endocrinology  6/11/20 thyroglobulin less than 0.1, thyroglobulin antibody less than 0.9 on levothyroxine 88 mcg 6 days a week, levothyroxine 75 mcg 1 day a week  10/7/20 tsh 1.19, free t4 1.56 on levothyroxine 88 mcg 6 days a week, levothyroxine 75 mcg 1 day a week  10/25/20 leonid kang at  endocrine note on levothyroxine 88 mcg 6 days a week, levothyroxine 75 mcg 1 day a week  12/8/20 leonid kang at  endocrine note tsh 0.71,free t4 1.3,  on levothyroxine 88 mcg 6 days  a week, and levothyroxine 125 mcg 1/2 tab qday  4/12/21 tsh 0.5,free t4 1.5  4/19/21  endocrine note on levothyroxine 88 mcg 6 days a week, and levothyroxine 125 mcg 1/2 tab 1 day a week  11/4/21 tsh 1.1,free t4 1.6 on levothyroxine 88 mcg 6 days a week, and levothyroxine 125 mcg 1/2 tab 1 day a week per   2/10/22 tsh 0.9 on levothyroxine 88 mcg 6 days a week, and levothyroxine 125 mcg 1/2 tab 1 day a week per  endocrine  8/15/22 tsh 0.4,free t4 1.56 on levothyroxine 88 mcg six days per week change to 88 mcg six days per week and 1/2 tab one day per week   8/25/22 vanessa cox at [facility] endocrine note () on levothyroxine 88 mcg 6 days per week and 1/2 tab one day per week   11/1/22 tsh 0.9,free t4 1.5,thyroglobulin<0.1,thyroglobulin ab<0.9 on levothyroxine 88 mcg 6 days per week and 1/2 tab one day per week   11/10/22 vanessa cox at [facility] endocrine note () continue on levothyroxine 88 mcg 6 days per week and 1/2 tab one day per week  3/30/23 tsh 0.64, free t4 1.48 on levothyroxine 88 mcg 6 days and 1/2 tab day 7  4/13/23  endocrine note on levothyroxine 88 mcg 6 days and 1/2 tab day 7 change to levothyroxine 75 mcg daily  10/11/23 tsh 0.98,free t4 1.46,thyroglobulin<0.1 on synthroid 75 mcg daily  1/3/25 tsh 2.3,free t4 1.5  1/9/25  endocrine note  2/10/25 tsh 2.4,free t4 1.49     History depression  Since 2001, having failed celexa and effexor?, lexapro working well  4/10 samples cymbalta  6/11 off cymbalta  7/24/14 trial wellbutrin 150 mg, PHQ-9 17  8/19/14 behavioral health note; pt will follow up with psychotherapy  9/22/14 increase wellbutrin to 300 mgdepression  10/21/14 PHQ 14  10/21/14 decrease wellbutrin to 150 mg and add effexor XR 75 mg qday  1/25/15 off wellbutrin  2/13/23 depression screening score 0     History compression fracture  11/10/04 thoracic x-ray negative  3/4/14 cxr mid apex right lateral curvature  thoracic spine may be positional or from mild scoliosis  3/7/16 cxr mid and upper thoracic spine compression fracture  9/5/17 dexa LS-0.7,hip-1.2  6/18/20 dexa LS-0.6,hip-1.4  2/10/22 vit d 43  2/14/23 dexa LS-1.5,hip-2.0  3/5/24 vit d 58    fecal incontinence  1/17/25 colon per [facility] hyperplastic polyps, moderate diverticulosis  1/30/25 [facility] note patient experiencing fecal leakage, now with urinary incontinence, possibly secondary to pelvic floor dysfunction, recommend follow-up with  for ARM, but she would like to hold off until she sees gynecology, will submit referral to [facility] ct cardiac calcium  6/17/20 CT high-resolution thorax,coronary calcifications  10/12/20 CT thorax atherosclerosis with coronary calcification  11/12/20 CT coronary calcium score: LMA 0.0, LCx 211.5, .3, .5, .2 = 1169.4, probable dilation left ventricle and left atrium, linear density lingula and left lower lobe consistent with scar or atelectasis  2/8/21 chol 143,trig 151,hdl 41,ldl 72 on lipitor 80 mg  3/16/21 stress echo negative for ischemia, appropriate augmentation LV function after maximal exercise, EF 60%  4/13/21 ultrasound carotid <50% bilateral stenosis  2/21/23 chol 155,trig 120,hdl 50,ldl 81 on lipitor 80 mg and zetia 10 mg     Dyslipidemia  2/09 chol 204,trig 130, hdl 62, ldl 116  5/10 chol 142,trig 63,hdl 64,ldl 65  6/11 chol 120,trig 65,hdl 68,ldl 39 on mevacor and niaspan  1/12 chol 98,trig 55,hdl 40,ldl 47 on mevacor and niaspan; ok to stop niaspan  2/13 chol 172,trig 314,hdl 45,ldl 64 on mevacor 40 mg; start fish oil 2 bid   3/15/14 chol 198,trig 101,hdl 54,ldl 124 on mevacor 40 mg and fish oil  7/29/14 dietary evaluation and education  3/25/15 chol 175,trig 107,hdl 54,ldl 100 on mevacor 40 mg  5/17/16 chol 196,trig 105,hdl 52,ldl 123 on mevacor 40 mg  9/5/17 chol 206,trig 139,hdl 48,ldl 130 on mevacor 40 mg  9/8/17 change to lipitor 20 mg and repeat labs in 4  weeks  11/1/17 chol 179,trig 168,hdl 44,ldl 101 on lipitor 20 mg  10/19/18 chol 188,trig 170,hdl 47,ldl 107 on lipitor 20 mg  6/11/20 chol 181,trig 137,hdl 49,ldl 105 on lipitor 20 mg  10/12/20 CT thorax atherosclerosis with coronary calcification  11/12/20 CT coronary calcium score: LMA 0.0, LCx 211.5, .3, .5, .2 = 1169.4, probable dilation left ventricle and left atrium, linear density lingula and left lower lobe consistent with scar or atelectasis  11/12/20 increase lipitor to 80 mg repeat labs 4 weeks, stress echo ordered  2/8/21 chol 143,trig 151,hdl 41,ldl 72 on lipitor 80 mg  3/16/21 stress echo negative for ischemia, appropriate augmentation LV function after maximal exercise, EF 60%  4/13/21 ultrasound carotid <50% bilateral stenosis  2/10/22 chol 174,trig 110,hdl 50,ldl 102 on lipitor 80 mg  2/22/22 start zetia 10 mg, continue on lipitor 80 mg  5/26/22 chol 149,trig 106,hdl 51,ldl 77 on lipitor 80 mg and zetia 10 mg   2/21/23 chol 155,trig 120,hdl 50,ldl 81 on lipitor 80 mg and zetia 10 mg  3/5/24 chol 157,trig 165,hdl 51,ldl 73  on lipitor 80 mg and zetia 10 mg     cough  10/11/18 cough chronic in nature, question allergic rhinitis although treatment ineffective for preventing cough, tried prilosec over-the-counter no benefit  10/11/18 cxr negative  11/8/18 PFT FEV1 1.8 (87% predicted), FVC 2.5 (91% predicted), FEV/FVC 74%, no bronchodilator response, DLCO 120% predicted, normal pulmonary function testing   11/12/18 symptoms somewhat improved on prilosec 20 mg increased to 40 mg  1/6/20 trial protonix, CT high resolution ordered, referral allergy  6/17/20 CT high-resolution thorax no evidence of interstitial thickening or honeycombing, scattered cystic lucencies, consideration lymphocytic interstitial pneumonitis, emphysema, lymphangiomyomatosis, focal groundglass opacities upper lobes, coronary calcifications  7/8/20 pulmonary note start spiriva, repeat CT in 3 months  10/12/20 CT  thorax stable 5 to 6 mm biapical groundglass nodules, no new nodules hyperinflated lungs, atherosclerosis with coronary calcification  10/28/20  allergy note cough, proceed with aeroallergens skin testing, continue flonase, start sinus saline rinse, consider ENT or GI referral if allergy testing is negative, consider trial of singulair or atrovent nasal spray  11/3/20 pulmonary note chronic cough, off spiriva, suspect GERD, consider ENT assessment vocal cords, follow-up 6 months  2/16/21  allergy note cough, positive aeroallergens skin test on 11/3/20 positive to trees, grasses, weeds, one mold, cat, guinea pig, horse, rabbit and dust mite, continue xyxal, astelin, atrovent nasal, flonase, trial of stopping montelukast to see if fatigue improves, possible ENT evaluation chronic sinusitis, continue pantoprazole  3/10/21  ENT note flexible laryngoscopy, right nasal septal deviation, interarytenoid edema, cough is better with aggressive nasal regimen and PPI, follow-up GI for dysphagia, will obtain CT sinus, continue rinses bid, PPI before dinner, stop astelin  4/13/21 CT maxillofacial without plus reconstruction, no evidence of sinus disease, convex left nasal septal deviation  4/29/21 [facility] note previous aeroallergens skin test positive to trees, grasses, weeds, mold, cat, guinea pig, horse, rabbit and dust mite, cough is improved with ipratropium and astelin, xyzal, pantoprazole, off montelukast, with improvement in symptoms patient would like to hold off on immunotherapy, continue xyzal consider adding allegra 180 mg in am, follow-up ENT chronic sinusitis   5/19/21 [facility] note chronic cough, suspect more allergy related given improvement after allergy sprays, although she did start PPI at the same time, also has had esophageal dysphagia on occasion with solid foods, will schedule EGD for evaluation  5/27/11 CT thorax without, stable less than 6 mm biapical groundglass nodules,  emphysematous changes lungs  7/2/21 pulmonary note symptoms improved with singulair but she was not able to tolerate this medication, she also does see allergy and will continue with intranasal therapy, repeat CT thorax in 1 year, follow-up 6 months  8/25/21 EGD per [facility] noted schatzki's ring 18 mm balloon dilation, small hiatal hernia  8/27/21  allergy note cough chronic, has had work-up by pulmonary previously, number of positive testing on arrival allergy skin test, patient declines immunotherapy, continue xyzal 5 mg, flonase, astelin and atrovent nasal, did not tolerate singulair, and can add allegra 180 mg   12/7/21 stop protonix  3/10/22  allergy note on flonase, astelin, atrovent nasal, patient had dry cough for 10 to 20 years, has seen pulmonary previously, with aeroallergens skin test 11/3/20 positive for trees, grasses, weeds, mold, cat, guinea pig, horse, rabbit and dust mite, continue allegra 180 mg am, continue flonase, recommend trial of over-the-counter aasalcrom 1-2 times per day, increase astelin to twice daily, and can increase ipratropium to 3 times daily, cannot tolerate montelukast, patient declines food skin testing  6/23/22  allergy note patient declines immunotherapy and food skin testing,continue allegra,flonase,atrovent nasal,increase astelin to 2 sprays daily,use nasalcrom and if no improvement stop nasalcrom,continue protonix 20 mg, did not tolerate singulair  7/2/22 CT thorax without, stable biapical 5 to 6 mm groundglass lung nodules, underlying emphysema  11/10/22  allergy note positive aeroallergens skin testing but patient is not interested in immunotherapy, recommend increasing allegra to 180 mg bid for 2 weeks to see if cough improves, start atrovent nasal spray 2 to 3 times per day, continue flonase, nasalcrom, astelin, could not tolerate montelukast, continue pantoprazole  11/10/22  pulmonary note chronic cough, presumed allergic,  controlled at present, pulmonary nodule stable x2 years no indication for continued surveillance  2/23/23 b12 1326,vit d 58,magnesium 2.0 on protonix  5/4/23  allergy note patient not interested in aero allergen immunotherapy, cannot tolerate singulair, consider trial of allegra 180 mg bid for 7 to 10 days to see if benefits cough, continue atrovent nasal,flonase,nasalcrom,astelin, protonix 20 mg  8/17/23 chest x-ray negative  3/5/24 b12 1453,vit d 58,magnesium 2.1 on protonix 20 mg     Allergic rhinitis  10/28/20  allergy note cough, proceed with aeroallergens skin testing, continue flonase, start sinus saline rinse, consider ENT or GI referral if allergy testing is negative, consider trial of singulair or atrovent nasal spray  11/3/20  allergy note, aeroallergy skin test positive to trees, grasses, weeds, mold, cat, guinea pig, horse, rabbit, dust mite, recommend addition of astelin nasal spray to montelukast  2/16/21  allergy note cough, positive aeroallergens skin test on 11/3/20 positive to trees, grasses, weeds, one mold, cat, guinea pig, horse, rabbit and dust mite, continue xyxal, astelin, atrovent nasal, flonase, trial of stopping montelukast to see if fatigue improves, possible ENT evaluation chronic sinusitis, continue pantoprazole  3/10/21  ENT note flexible laryngoscopy, right nasal septal deviation, interarytenoid edema, cough is better with aggressive nasal regimen and PPI, follow-up GI for dysphagia, will obtain CT sinus, continue rinses bid, PPI before dinner, stop astelin  4/23/21 CT maxillofacial without plus reconstruction, no evidence of sinus disease, convex left nasal septal deviation  4/29/21 [facility] note previous aeroallergens skin test positive to trees, grasses, weeds, mold, cat, guinea pig, horse, rabbit and dust mite, cough is improved with ipratropium and astelin, xyzal, pantoprazole, off montelukast, with improvement in symptoms patient  would like to hold off on immunotherapy, continue xyzal consider adding allegra 180 mg in am, follow-up ENT chronic sinusitis  5/19/21 [facility] note chronic cough, suspect more allergy related given improvement after allergy sprays, although she did start PPI at the same time, also has had esophageal dysphagia on occasion with solid foods, will schedule EGD for evaluation  8/27/21  allergy note cough chronic, has had work-up by pulmonary previously, number of positive testing on arrival allergy skin test, patient declines immunotherapy, continue xyzal 5 mg, flonase, astelin and atrovent nasal, did not tolerate singulair, and can add allegra 180 mg    11/30/21  allergy aeroallergens skin test positive to trees, grasses, weeds, mold, cat, guinea pig, horse, rabbit, dust mite  12/7/21  allergy no cough seen by pulmonary, tried a no steroid no benefit, offered immunotherapy patient declines, continue allegra, flonase, astelin, atrovent nasal, protonix, cannot tolerate singulair, follow-up ENT, pulmonary, can try over-the-counter nasalcrom  3/10/22  allergy note on flonase, astelin, atrovent nasal, patient had dry cough for 10 to 20 years, has seen pulmonary previously, with aeroallergens skin test 11/3/20 positive for trees, grasses, weeds, mold, cat, guinea pig, horse, rabbit and dust mite, continue allegra 180 mg am, continue flonase, recommend trial of over-the-counter aasalcrom 1-2 times per day, increase astelin to twice daily, and can increase ipratropium to 3 times daily, cannot tolerate montelukast, patient declines food skin testing  6/23/22  allergy note patient declines immunotherapy and food skin testing,continue allegra,flonase,atrovent nasal,increase astelin to 2 sprays daily,use nasalcrom and if no improvement stop nasalcrom,continue protonix 20 mg, did not tolerate singulair  11/10/22  allergy note positive aeroallergens skin testing but patient is not  interested in immunotherapy, recommend increasing allegra to 180 mg bid for 2 weeks to see if cough improves, start atrovent nasal spray 2 to 3 times per day, continue flonase, nasalcrom, astelin, could not tolerate montelukast, continue pantoprazole  5/4/23  allergy note patient not interested in aero allergen immunotherapy, cannot tolerate singulair, consider trial of allegra 180 mg bid for 7 to 10 days to see if benefits cough, continue atrovent nasal,flonase,nasalcrom,astelin, protonix 20 mg  xyzal cause drowsiness     adenoma  2004 no records path unknown  4/30/12 [facility] colon tubular adenoma, repeat 5 yrs  10/20/17 colon per [facility] 5 polyps pathology 4 polyps adenomatous and benign polypoid tissue, severe diverticulosis, recommend repeat colonoscopy 3 years  8/25/21 colonoscopy per [facility] three adenomas, repeat in 3 years  1/17/25 colon per [facility] hyperplastic polyps, moderate diverticulosis,follow up 5 years to discuss benefits of colonoscopy      Current Outpatient Medications   Medication Sig Dispense Refill    estradiol (ESTRACE) 0.1 MG/GM vaginal cream APPLY 1 GRAM VAGINALLY WITH FINGER TWICE WEEKLY AT BEDTIME      ezetimibe (ZETIA) 10 MG Tab Take 1 Tablet by mouth every day. 90 Tablet 1    potassium chloride ER (KLOR-CON) 10 MEQ tablet Take 1 tablet by mouth once daily 90 Tablet 2    atorvastatin (LIPITOR) 80 MG tablet Take 1 tablet by mouth once daily 90 Tablet 2    fluticasone (FLONASE) 50 MCG/ACT nasal spray Use 2 spray(s) in each nostril once daily 48 g 3    pantoprazole (PROTONIX) 20 MG tablet Take 1 tablet by mouth once daily 180 Tablet 3    levothyroxine (SYNTHROID) 75 MCG Tab Take 1 Tablet by mouth every morning on an empty stomach. 1 Tablet 0    potassium chloride SA (K-DUR) 10 MEQ Tab CR TAKE ONE TABLET BY MOUTH DAILY 90 Tablet 3    naproxen (NAPROSYN) 500 MG Tab Take 1 Tablet by mouth 2 times a day as needed (pain). 60 Tablet 2    fexofenadine (ALLEGRA) 180 MG tablet       melatonin 5 mg Tab        ipratropium (ATROVENT) 0.03 % Solution PUT 1 TO 2 SPRAYS INTO EACH NOSTRIL ONE TO TWO TIMES DAILY      Calcium Polycarbophil (FIBER-CAPS PO) Take  by mouth.      BIOTIN PO Take  by mouth.      azelastine (ASTELIN) 137 MCG/SPRAY nasal spray Administer 1 Spray into affected nostril(S) 2 times a day.      Multiple Vitamin (MULTI-DAY PO) Take  by mouth.      Calcium Carbonate-Vitamin D (CALCIUM 600/VITAMIN D PO) Take  by mouth. Three times a week      cyanocobalamin (VITAMIN B-12) 500 MCG Tab Take 1 Tablet by mouth every day.      Cholecalciferol (VITAMIN D3 PO) Take  by mouth.      Glucosamine HCl (GLUCOSAMINE PO) Take  by mouth.      Omega-3 Fatty Acids (OMEGA 3 PO) Take  by mouth.      acyclovir (ZOVIRAX) 400 MG tablet Take 1 Tablet by mouth 2 times a day.       No current facility-administered medications for this visit.     Depression Screening  Little interest or pleasure in doing things?  0 - not at all  Feeling down, depressed , or hopeless? 1 - several days  Trouble falling or staying asleep, or sleeping too much?  2 - more than half the days  Feeling tired or having little energy?  0 - not at all  Poor appetite or overeating?  0 - not at all  Feeling bad about yourself - or that you are a failure or have let yourself or your family down? 0 - not at all  Trouble concentrating on things, such as reading the newspaper or watching television? 0 - not at all  Moving or speaking so slowly that other people could have noticed.  Or the opposite - being so fidgety or restless that you have been moving around a lot more than usual?  0 - not at all  Thoughts that you would be better off dead, or of hurting yourself?  0 - not at all  Patient Health Questionnaire Score: 3    If depressive symptoms identified deferred to follow up visit unless specifically addressed in assessment and plan.    Interpretation of PHQ-9 Total Score   Score Severity   1-4 No Depression   5-9 Mild Depression   10-14 Moderate Depression   15-19  Moderately Severe Depression   20-27 Severe Depression    Screening for Cognitive Impairment  Do you or any of your friends or family members have any concern about your memory? No  Three Minute Recall (Village, Kitchen, Baby) 3/3    Augusto clock face with all 12 numbers and set the hands to show 10 minutes past 11.  Yes    Cognitive concerns identified deferred for follow up unless specifically addressed in assessment and plan.    Fall Risk Assessment  Has the patient had two or more falls in the last year or any fall with injury in the last year?  No    Safety Assessment  Do you always wear your seatbelt?  Yes  Any changes to home needed to function safely? No  Difficulty hearing.  No  Patient counseled about all safety risks that were identified.    Functional Assessment ADLs  Are there any barriers preventing you from cooking for yourself or meeting nutritional needs?  No.    Are there any barriers preventing you from driving safely or obtaining transportation?  No.    Are there any barriers preventing you from using a telephone or calling for help?  No    Are there any barriers preventing you from shopping?  No.    Are there any barriers preventing you from taking care of your own finances?  No    Are there any barriers preventing you from managing your medications?  No    Are there any barriers preventing you from showering, bathing or dressing yourself? No    Are there any barriers preventing you from doing housework or laundry? No    Are there any barriers preventing you from using the toilet?No    Are you currently engaging in any exercise or physical activity?  Yes.      Self-Assessment of Health  What is your perception of your health? Good    Do you sleep more than six hours a night? No    In the past 7 days, how much did pain keep you from doing your normal work? None    Do you spend quality time with family or friends (virtually or in person)? Yes    Do you usually eat a heart healthy diet that constists  of a variety of fruits, vegetables, whole grains and fiber? No    Do you eat foods high in fat and/or Fast Food more than three times per week? No    How concerned are you that your medical conditions are not being well managed? Not at all    Are you worried that in the next 2 months, you may not have stable housing that you own, rent, or stay in as part of a household? No        Advance Care Planning  Do you have an Advance Directive, Living Will, Durable Power of , or POLST? Yes  Advance Directive       is on file      Health Maintenance Summary            Current Care Gaps       Annual Wellness Visit (Yearly) Overdue since 2/15/2025      02/15/2024  Visit Dx: Medicare annual wellness visit, subsequent    02/13/2023  Visit Dx: Medicare annual wellness visit, subsequent    12/07/2021  Visit Dx: Medicare annual wellness visit, subsequent    01/06/2020  Visit Dx: Medicare annual wellness visit, subsequent    10/11/2018  Visit Dx: Medicare annual wellness visit, subsequent     Only the first 5 history entries have been loaded, but more history exists.                    Upcoming       Bone Density Scan (Every 5 Years) Next due on 2/17/2028 02/17/2023  DS-BONE DENSITY STUDY (DEXA)    06/17/2020  DS-BONE DENSITY STUDY (DEXA)    09/05/2017  DS-BONE DENSITY STUDY (DEXA)    06/12/2015  DS-BONE DENSITY STUDY (DEXA)    07/06/2011  DS-BONE DENSITY STUDY (DEXA)      Only the first 5 history entries have been loaded, but more history exists.              Colorectal Cancer Screening (Colonoscopy - Every 5 Years) Next due on 2/6/2030 02/06/2025  COLONOSCOPY RESULTS    01/17/2025  COLONOSCOPY RESULTS    01/17/2025  COLONOSCOPY RESULTS    09/06/2021  REFERRAL TO GI FOR COLONOSCOPY    08/25/2021  REFERRAL TO GI FOR COLONOSCOPY      Only the first 5 history entries have been loaded, but more history exists.              IMM DTaP/Tdap/Td Vaccine (3 - Td or Tdap) Next due on 2/17/2033 02/17/2023  Imm Admin: Tdap  Vaccine    01/19/2012  Imm Admin: Tdap Vaccine                      Completed or No Longer Recommended       Influenza Vaccine (Series Information) Completed      10/19/2024  Imm Admin: Influenza high-dose trivalent (PF)    09/12/2023  Imm Admin: Influenza Vaccine Adult HD    10/07/2022  Imm Admin: Influenza Vaccine Adult HD    09/28/2021  Imm Admin: Influenza Vaccine Adult HD    09/04/2020  Imm Admin: Influenza, Unspecified - HISTORICAL DATA      Only the first 5 history entries have been loaded, but more history exists.              Zoster (Shingles) Vaccines (Series Information) Completed      02/05/2020  Imm Admin: Zoster Vaccine Recombinant (RZV) (SHINGRIX)    02/05/2020  Imm Admin: Zoster Vaccine Recombinant (RZV) (SHINGRIX)    11/13/2019  Imm Admin: Zoster Vaccine Recombinant (RZV) (SHINGRIX)    03/04/2014  Imm Admin: Zoster Vaccine Live (ZVL) (Zostavax) - HISTORICAL DATA              COVID-19 Vaccine (Series Information) Completed      10/31/2024  Imm Admin: COVID-19, mRNA, LNP-S, PF, daisy-sucrose, 30 mcg/0.3 mL    11/02/2023  Imm Admin: Covid-19 Mrna (Spikevax) Moderna 12+ Years    10/01/2022  Imm Admin: PFIZER BIVALENT SARS-COV-2 VACCINE (12+)    04/08/2022  Imm Admin: MODERNA SARS-COV-2 VACCINE (12+)    10/11/2021  Imm Admin: PFIZER PURPLE CAP SARS-COV-2 VACCINATION (12+)      Only the first 5 history entries have been loaded, but more history exists.              Hepatitis C Screening  Completed      02/11/2024  Done    06/11/2020  Hepatitis C Antibody component of HEP C VIRUS ANTIBODY              Pneumococcal Vaccine: 50+ Years (Series Information) Completed      02/15/2024  Imm Admin: Pneumococcal Conjugate Vaccine (PCV20)    02/05/2015  Imm Admin: Pneumococcal Conjugate Vaccine (Prevnar/PCV-13)    01/19/2012  Imm Admin: Pneumococcal polysaccharide vaccine (PPSV-23)              Hepatitis A Vaccine (Hep A) (Series Information) Aged Out      No completion history exists for this topic.               Hepatitis B Vaccine (Hep B) (Series Information) Aged Out     No completion history exists for this topic.              HPV Vaccines (Series Information) Aged Out     No completion history exists for this topic.              Polio Vaccine (Inactivated Polio) (Series Information) Aged Out     No completion history exists for this topic.              Meningococcal Immunization (Series Information) Aged Out     No completion history exists for this topic.              Mammogram  Discontinued        Frequency changed to Never automatically (Topic No Longer Applies)    02/22/2024  MA-SCREENING MAMMO BILAT W/TOMOSYNTHESIS W/CAD    02/14/2023  MA-SCREENING MAMMO BILAT W/TOMOSYNTHESIS W/CAD    01/04/2022  MA-SCREENING MAMMO BILAT W/TOMOSYNTHESIS W/CAD    06/17/2020  MA-SCREENING MAMMO BILAT W/TOMOSYNTHESIS W/CAD     Only the first 5 history entries have been loaded, but more history exists.            Cervical Cancer Screening  Discontinued        Frequency changed to Never automatically (Topic No Longer Applies)    11/27/2018  THINPREP PAP, REFLEX HPV ON ASC-US AND ABOVE    11/27/2018  PATHOLOGY GYN SPECIMEN    06/21/2016  THINPREP PAP, REFLEX HPV ON ASC-US AND ABOVE    06/21/2016  PATHOLOGY GYN SPECIMEN      Only the first 5 history entries have been loaded, but more history exists.                            Patient Care Team:  Asael Redd M.D. as PCP - General  Jeimy Huntley M.D. as Consulting Physician (OB & Gyn - Gynecology)  Carolyn Ventura M.D. as Consulting Physician (Endocrinology)  Mejia Saez O.D. as Consulting Physician (Optometry)  Charu Spann M.D. as Consulting Physician (Dermatology)  Jose Alejandro Godinez M.D. as Consulting Physician (Ophthalmology)  Viraj Rodriguez M.D. (Inactive) as Consulting Physician (Ophthalmology)  Fred Fuentes M.D. as Consulting Physician (Ophthalmology)      Health Care Screening recommendations reviewed with patient today and updated or  "ordered.  DPA/Advanced directive: Completed/Information provided.   Referrals for PT/OT/Nutrition counseling/Behavioral Health/Smoking cessation as above if indicated  Discussion today about general wellness and lifestyle habits:    Prevent falls and reduce trip hazards;   Have a working fire alarm and carbon monoxide detector;   Engage in regular physical activity and social activities;   Use sun protection when outdoors.     ROS           Objective     /60   Pulse 70   Temp 36.1 °C (97 °F) (Temporal)   Ht 1.6 m (5' 3\")   Wt 66 kg (145 lb 8.1 oz)   SpO2 97%   BMI 25.77 kg/m²      Physical Exam  Vitals and nursing note reviewed.   Constitutional:       Appearance: Normal appearance.   HENT:      Head: Normocephalic and atraumatic.      Right Ear: External ear normal.      Left Ear: External ear normal.      Nose: Nose normal.   Cardiovascular:      Rate and Rhythm: Normal rate and regular rhythm.      Heart sounds: Normal heart sounds.   Pulmonary:      Effort: Pulmonary effort is normal.      Breath sounds: Normal breath sounds.   Abdominal:      General: There is no distension.   Musculoskeletal:         General: No swelling.      Cervical back: Neck supple.   Skin:     Findings: No bruising.   Neurological:      Mental Status: She is alert.   Psychiatric:         Mood and Affect: Mood normal.         Assessment & Plan       Assessment  #! Medicare wellness appointment     #2  Allergic rhinitis and itching followed by  allergy I do not have any recent records.  Patient is on Pepcid twice daily, Allegra daily    #3 chronic cough either related to allergies or reflux, followed by pulmonary, has also seen ENT and GI    #4 reflux on pantoprazole 20 mg daily    #5 dyslipidemia 3/5/24 chol 157,trig 165,hdl 51,ldl 73  on lipitor 80 mg and zetia 10 mg    #6 elevated CT calcium score 11/12/20 CT coronary calcium score: LMA 0.0, LCx 211.5, .3, .5, .2 = 1169.4 with negative subsequent " stress echo 3/16/21 stress echo negative for ischemia, appropriate augmentation LV function after maximal exercise, EF 60%    #7 history of papillary thyroid cancer status post thyroidectomy 2007 followed by endocrinology    #8 vaginal atrophy postmenopausal on vaginal estrogen per gynecology    #9 postmenopausal bone loss and vitamin D deficiency most recent DEXA 2/14/23 dexa LS-1.5,hip-2.0    #10 basal cell skin cancer followed by dermatology    #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 Health maintenance reviewed and updated     #2 old records  endocrine, continue Synthroid per endocrinology 75 mcg daily, thyroid labs per endocrinology    #3 old records gynecology F[facility] dr.ernst barrett, continue vaginal estrogen per gynecology    #4 old records  allergy     #5 [facility]    #6 old records [facility]     #7 mammogram and bone density    #8 Labs    #9 start walking daily start of at least 5 minutes daily, she does not like to walk when it is warm outside show she has not been walking for exercise since May    #10 power of  paperwork as daughter anna perkins lives in [place], asked her to drop the power of  paperwork off, or she can send us a copy of that in [employer] so we can uploaded that into her chart    #11 continue working on a good nutrition program limiting sweets, candies, processed foods    12 follow-up 1 year

## 2025-02-21 NOTE — LETTER
[facility]  Asael Redd M.D.  [address]  [address]  [place] [address]  Fax: [phone]   Authorization for Release/Disclosure of   Protected Health Information   Name: PING MAYS : 1945 SSN: [id number]   Address: [facility] [place] [address] Phone:    [phone] (home)    I authorize the entity listed below to release/disclose the PHI below to:   [facility]/Asael Redd M.D. and Asael Redd M.D.   Provider or Entity Name:     Address   City, State, [facility]   Phone:      Fax:[phone]      Reason for request: continuity of care   Information to be released:    [  ] LAST COLONOSCOPY,  including any PATH REPORT and follow-up  [  ] LAST FIT/COLOGUARD RESULT [  ] LAST DEXA  [  ] LAST MAMMOGRAM  [  ] LAST PAP  [  ] LAST LABS [  ] RETINA EXAM REPORT  [  ] IMMUNIZATION RECORDS  [  X] Release last 12 months       [  ] Check here and initial the line next to each item to release ALL health information INCLUDING  _____ Care and treatment for drug and / or alcohol abuse  _____ HIV testing, infection status, or AIDS  _____ Genetic Testing    DATES OF SERVICE OR TIME PERIOD TO BE DISCLOSED: _____________  I understand and acknowledge that:  * This Authorization may be revoked at any time by you in writing, except if your health information has already been used or disclosed.  * Your health information that will be used or disclosed as a result of you signing this authorization could be re-disclosed by the recipient. If this occurs, your re-disclosed health information may no longer be protected by State or Federal laws.  * You may refuse to sign this Authorization. Your refusal will not affect your ability to obtain treatment.  * This Authorization becomes effective upon signing and will  on (date) __________.      If no date is indicated, this Authorization will  one (1) year from the signature date.    Name: Ping Mays  Signature: Date:   2025     PLEASE FAX  REQUESTED RECORDS BACK TO: [phone]

## 2025-02-21 NOTE — TELEPHONE ENCOUNTER
need old records please  (1)  endocrine for the past 12 months  (2)  allergy for the past 12 months  (3)  gyn at [facility]  (4) skin cancer dermatology [place] for the past 12 months  (5) optic gallery optometry [facility] records for the last 12 months

## 2025-02-21 NOTE — LETTER
[facility]  Asael Redd M.D.  [address]  [address]  [place] [address]  Fax: [phone]   Authorization for Release/Disclosure of   Protected Health Information   Name: PING MAYS : 1945 SSN: [id number]   Address: [facility] [place] [address] Phone:    [phone] (home)    I authorize the entity listed below to release/disclose the PHI below to:   [facility]/Asael Redd M.D. and Asael Redd M.D.   Provider or Entity Name: Dr. Ramesh     [facility]   Phone:      Fax:[phone]      Reason for request: continuity of care   Information to be released:    [  ] LAST COLONOSCOPY,  including any PATH REPORT and follow-up  [  ] LAST FIT/COLOGUARD RESULT [  ] LAST DEXA  [  ] LAST MAMMOGRAM  [  ] LAST PAP  [  ] LAST LABS [  ] RETINA EXAM REPORT  [  ] IMMUNIZATION RECORDS  [  ] Release last 12 months       [  ] Check here and initial the line next to each item to release ALL health information INCLUDING  _____ Care and treatment for drug and / or alcohol abuse  _____ HIV testing, infection status, or AIDS  _____ Genetic Testing    DATES OF SERVICE OR TIME PERIOD TO BE DISCLOSED: _____________  I understand and acknowledge that:  * This Authorization may be revoked at any time by you in writing, except if your health information has already been used or disclosed.  * Your health information that will be used or disclosed as a result of you signing this authorization could be re-disclosed by the recipient. If this occurs, your re-disclosed health information may no longer be protected by State or Federal laws.  * You may refuse to sign this Authorization. Your refusal will not affect your ability to obtain treatment.  * This Authorization becomes effective upon signing and will  on (date) __________.      If no date is indicated, this Authorization will  one (1) year from the signature date.    Name: Ping Mays  Signature: Date:   2025      PLEASE FAX REQUESTED RECORDS BACK TO: [phone]

## 2025-02-21 NOTE — LETTER
[facility]  Asael Redd M.D.  [address]  [address]  [place] [address]  Fax: [phone]   Authorization for Release/Disclosure of   Protected Health Information   Name: PING MAYS : 1945 SSN: [id number]   Address: [facility] [place] [address] Phone:    [phone] (home)    I authorize the entity listed below to release/disclose the PHI below to:   [facility]/Asael Redd M.D. and Asael Redd M.D.   Provider or Entity Name:  [facility]    Address   City, State, [facility]   Phone:      Fax: [phone]     Reason for request: continuity of care   Information to be released:    [  ] LAST COLONOSCOPY,  including any PATH REPORT and follow-up  [  ] LAST FIT/COLOGUARD RESULT [  ] LAST DEXA  [  ] LAST MAMMOGRAM  [  ] LAST PAP  [  ] LAST LABS [  ] RETINA EXAM REPORT  [  ] IMMUNIZATION RECORDS  [ X ] Release last 12 months       [  ] Check here and initial the line next to each item to release ALL health information INCLUDING  _____ Care and treatment for drug and / or alcohol abuse  _____ HIV testing, infection status, or AIDS  _____ Genetic Testing    DATES OF SERVICE OR TIME PERIOD TO BE DISCLOSED: _____________  I understand and acknowledge that:  * This Authorization may be revoked at any time by you in writing, except if your health information has already been used or disclosed.  * Your health information that will be used or disclosed as a result of you signing this authorization could be re-disclosed by the recipient. If this occurs, your re-disclosed health information may no longer be protected by State or Federal laws.  * You may refuse to sign this Authorization. Your refusal will not affect your ability to obtain treatment.  * This Authorization becomes effective upon signing and will  on (date) __________.      If no date is indicated, this Authorization will  one (1) year from the signature date.    Name: Ping Mays  Signature: Date:    2/21/2025     PLEASE FAX REQUESTED RECORDS BACK TO: [phone]

## 2025-02-22 PROBLEM — H26.9 CATARACT: Status: ACTIVE | Noted: 2025-02-22

## 2025-02-22 PROBLEM — Z85.828 HISTORY OF BASAL CELL CARCINOMA (BCC) OF SKIN: Status: ACTIVE | Noted: 2020-01-06

## 2025-02-24 PROBLEM — N81.10 VAGINAL PROLAPSE: Status: ACTIVE | Noted: 2025-02-24

## 2025-03-06 ENCOUNTER — HOSPITAL ENCOUNTER (OUTPATIENT)
Dept: LAB | Facility: MEDICAL CENTER | Age: 80
End: 2025-03-06
Attending: INTERNAL MEDICINE
Payer: MEDICARE

## 2025-03-06 DIAGNOSIS — E78.5 DYSLIPIDEMIA: ICD-10-CM

## 2025-03-06 DIAGNOSIS — E78.5 DYSLIPIDEMIA: Chronic | ICD-10-CM

## 2025-03-06 DIAGNOSIS — R73.09 IMPAIRED GLUCOSE METABOLISM: ICD-10-CM

## 2025-03-06 DIAGNOSIS — R31.29 MICROSCOPIC HEMATURIA: ICD-10-CM

## 2025-03-06 DIAGNOSIS — E55.9 VITAMIN D DEFICIENCY: ICD-10-CM

## 2025-03-06 LAB
APPEARANCE UR: CLEAR
BACTERIA #/AREA URNS HPF: NORMAL /HPF
BASOPHILS # BLD AUTO: 0.7 % (ref 0–1.8)
BASOPHILS # BLD: 0.05 K/UL (ref 0–0.12)
BILIRUB UR QL STRIP.AUTO: NEGATIVE
CASTS URNS QL MICRO: NORMAL /LPF (ref 0–2)
COLOR UR: YELLOW
EOSINOPHIL # BLD AUTO: 0.17 K/UL (ref 0–0.51)
EOSINOPHIL NFR BLD: 2.5 % (ref 0–6.9)
EPITHELIAL CELLS 1715: NORMAL /HPF (ref 0–5)
ERYTHROCYTE [DISTWIDTH] IN BLOOD BY AUTOMATED COUNT: 44.8 FL (ref 35.9–50)
EST. AVERAGE GLUCOSE BLD GHB EST-MCNC: 123 MG/DL
GLUCOSE UR STRIP.AUTO-MCNC: NEGATIVE MG/DL
HBA1C MFR BLD: 5.9 % (ref 4–5.6)
HCT VFR BLD AUTO: 41.6 % (ref 37–47)
HGB BLD-MCNC: 13.5 G/DL (ref 12–16)
IMM GRANULOCYTES # BLD AUTO: 0.01 K/UL (ref 0–0.11)
IMM GRANULOCYTES NFR BLD AUTO: 0.1 % (ref 0–0.9)
KETONES UR STRIP.AUTO-MCNC: NEGATIVE MG/DL
LEUKOCYTE ESTERASE UR QL STRIP.AUTO: ABNORMAL
LYMPHOCYTES # BLD AUTO: 1.75 K/UL (ref 1–4.8)
LYMPHOCYTES NFR BLD: 25.9 % (ref 22–41)
MCH RBC QN AUTO: 30.5 PG (ref 27–33)
MCHC RBC AUTO-ENTMCNC: 32.5 G/DL (ref 32.2–35.5)
MCV RBC AUTO: 94.1 FL (ref 81.4–97.8)
MICRO URNS: ABNORMAL
MONOCYTES # BLD AUTO: 0.57 K/UL (ref 0–0.85)
MONOCYTES NFR BLD AUTO: 8.4 % (ref 0–13.4)
NEUTROPHILS # BLD AUTO: 4.21 K/UL (ref 1.82–7.42)
NEUTROPHILS NFR BLD: 62.4 % (ref 44–72)
NITRITE UR QL STRIP.AUTO: NEGATIVE
NRBC # BLD AUTO: 0 K/UL
NRBC BLD-RTO: 0 /100 WBC (ref 0–0.2)
PH UR STRIP.AUTO: 7 [PH] (ref 5–8)
PLATELET # BLD AUTO: 281 K/UL (ref 164–446)
PMV BLD AUTO: 10.1 FL (ref 9–12.9)
PROT UR QL STRIP: NEGATIVE MG/DL
RBC # BLD AUTO: 4.42 M/UL (ref 4.2–5.4)
RBC # URNS HPF: NORMAL /HPF (ref 0–2)
RBC UR QL AUTO: NEGATIVE
SP GR UR STRIP.AUTO: 1.01
UROBILINOGEN UR STRIP.AUTO-MCNC: 0.2 EU/DL
WBC # BLD AUTO: 6.8 K/UL (ref 4.8–10.8)
WBC #/AREA URNS HPF: NORMAL /HPF

## 2025-03-06 PROCEDURE — 83036 HEMOGLOBIN GLYCOSYLATED A1C: CPT | Mod: GA

## 2025-03-06 PROCEDURE — 36415 COLL VENOUS BLD VENIPUNCTURE: CPT | Mod: GA

## 2025-03-06 PROCEDURE — 80061 LIPID PANEL: CPT

## 2025-03-06 PROCEDURE — 80053 COMPREHEN METABOLIC PANEL: CPT

## 2025-03-06 PROCEDURE — 81001 URINALYSIS AUTO W/SCOPE: CPT

## 2025-03-06 PROCEDURE — 85025 COMPLETE CBC W/AUTO DIFF WBC: CPT

## 2025-03-06 PROCEDURE — 82306 VITAMIN D 25 HYDROXY: CPT

## 2025-03-07 LAB
25(OH)D3 SERPL-MCNC: 48 NG/ML (ref 30–100)
ALBUMIN SERPL BCP-MCNC: 4.1 G/DL (ref 3.2–4.9)
ALBUMIN/GLOB SERPL: 1.8 G/DL
ALP SERPL-CCNC: 90 U/L (ref 30–99)
ALT SERPL-CCNC: 15 U/L (ref 2–50)
ANION GAP SERPL CALC-SCNC: 10 MMOL/L (ref 7–16)
AST SERPL-CCNC: 20 U/L (ref 12–45)
BILIRUB SERPL-MCNC: 0.6 MG/DL (ref 0.1–1.5)
BUN SERPL-MCNC: 15 MG/DL (ref 8–22)
CALCIUM ALBUM COR SERPL-MCNC: 9.2 MG/DL (ref 8.5–10.5)
CALCIUM SERPL-MCNC: 9.3 MG/DL (ref 8.5–10.5)
CHLORIDE SERPL-SCNC: 104 MMOL/L (ref 96–112)
CHOLEST SERPL-MCNC: 136 MG/DL (ref 100–199)
CO2 SERPL-SCNC: 26 MMOL/L (ref 20–33)
CREAT SERPL-MCNC: 0.59 MG/DL (ref 0.5–1.4)
GFR SERPLBLD CREATININE-BSD FMLA CKD-EPI: 91 ML/MIN/1.73 M 2
GLOBULIN SER CALC-MCNC: 2.3 G/DL (ref 1.9–3.5)
GLUCOSE SERPL-MCNC: 100 MG/DL (ref 65–99)
HDLC SERPL-MCNC: 47 MG/DL
LDLC SERPL CALC-MCNC: 69 MG/DL
POTASSIUM SERPL-SCNC: 4 MMOL/L (ref 3.6–5.5)
PROT SERPL-MCNC: 6.4 G/DL (ref 6–8.2)
SODIUM SERPL-SCNC: 140 MMOL/L (ref 135–145)
TRIGL SERPL-MCNC: 98 MG/DL (ref 0–149)

## 2025-03-08 RX ORDER — ATORVASTATIN CALCIUM 80 MG/1
80 TABLET, FILM COATED ORAL DAILY
Qty: 100 TABLET | Refills: 2 | Status: SHIPPED | OUTPATIENT
Start: 2025-03-08

## 2025-03-08 RX ORDER — POTASSIUM CHLORIDE 750 MG/1
10 TABLET, EXTENDED RELEASE ORAL DAILY
Qty: 100 TABLET | Refills: 2 | Status: SHIPPED | OUTPATIENT
Start: 2025-03-08

## 2025-03-09 ENCOUNTER — RESULTS FOLLOW-UP (OUTPATIENT)
Dept: MEDICAL GROUP | Facility: MEDICAL CENTER | Age: 80
End: 2025-03-09
Payer: MEDICARE

## 2025-03-10 NOTE — TELEPHONE ENCOUNTER
Called the patient left a message, please notify her that her test shows:  (1) normal liver function, normal kidney function on her blood test, her urine test shows no evidence of infection or diabetes  (2) her diabetes 90-day test is unchanged from year ago it is 5.9%, normal is less than 5.7%, she does not have diabetes but has mild prediabetes, no medications are necessary have her continue work on a good nutrition program limiting sweets, candies, processed foods  (3) her vitamin D level is normal  (4) her cholesterol is improved at 136 decreased from 157 last year, her good cholesterol is 47 (goal is above 40), and her bad cholesterol is 69, last year it was 73 (goal for bad cholesterol is at least less than 100 and ideally closer to 70 where she is at now), have her continue her cholesterol medications no changes

## 2025-03-14 ENCOUNTER — HOSPITAL ENCOUNTER (OUTPATIENT)
Dept: RADIOLOGY | Facility: MEDICAL CENTER | Age: 80
End: 2025-03-14
Attending: INTERNAL MEDICINE
Payer: MEDICARE

## 2025-03-14 DIAGNOSIS — Z12.31 ENCOUNTER FOR SCREENING MAMMOGRAM FOR BREAST CANCER: ICD-10-CM

## 2025-03-14 DIAGNOSIS — M81.0 POSTMENOPAUSAL BONE LOSS: ICD-10-CM

## 2025-03-14 PROCEDURE — 77067 SCR MAMMO BI INCL CAD: CPT

## 2025-03-14 PROCEDURE — 77080 DXA BONE DENSITY AXIAL: CPT

## 2025-03-16 ENCOUNTER — RESULTS FOLLOW-UP (OUTPATIENT)
Dept: MEDICAL GROUP | Facility: MEDICAL CENTER | Age: 80
End: 2025-03-16
Payer: MEDICARE

## 2025-03-17 NOTE — TELEPHONE ENCOUNTER
Please notify the patient that her bone density test shows that she still has decreased bone strength of her back and her hip but both the bone strength of her back and hip have improved since her last bone density test.  No medication is necessary.  Please have her continue taking supplemental vitamin D, calcium 1000 mg daily, and continue regular weightbearing exercises.    We can repeat her bone density test in 2 years.  
no

## 2025-03-20 ENCOUNTER — RESULTS FOLLOW-UP (OUTPATIENT)
Dept: MEDICAL GROUP | Facility: MEDICAL CENTER | Age: 80
End: 2025-03-20
Payer: MEDICARE

## 2025-03-20 DIAGNOSIS — R92.30 DENSE BREAST TISSUE ON MAMMOGRAM, UNSPECIFIED TYPE: ICD-10-CM

## 2025-03-20 NOTE — TELEPHONE ENCOUNTER
----- Message from Medical Assistant Piter Salazar Ass't sent at 3/20/2025  7:05 AM PDT -----  I called the patient and she stated that she would like to get the breast ultrasound. Patient is requesting you order the breast ultrasound.  ----- Message -----  From: Asael Redd M.D.  Sent: 3/20/2025  12:02 AM PDT  To: [facility]    Please notify the patient that:  (1) her mammogram is negative but does show dense breast tissue which may decrease the accuracy of the mammogram  (2) she may obtain a screening breast ultrasound which could provide further detail  (3) [facility] offers the screening breast ultrasound, however some insurance plans may not cover the screening breast ultrasound  (4) if the screening breast ultrasound is not covered, typically the out-of-pocket expense is approximately $125  (5) if she would like me to order the screening breast ultrasound let me know, and I can submit that order to [facility] imaging, otherwise we can repeat the screening mammogram in 1 year   Tevin Herring

## 2025-03-20 NOTE — TELEPHONE ENCOUNTER
Please notify the patient that:  (1) her mammogram is negative but does show dense breast tissue which may decrease the accuracy of the mammogram  (2) she may obtain a screening breast ultrasound which could provide further detail  (3) [facility] offers the screening breast ultrasound, however some insurance plans may not cover the screening breast ultrasound  (4) if the screening breast ultrasound is not covered, typically the out-of-pocket expense is approximately $125  (5) if she would like me to order the screening breast ultrasound let me know, and I can submit that order to [facility], otherwise we can repeat the screening mammogram in 1 year

## 2025-03-20 NOTE — TELEPHONE ENCOUNTER
----- Message from Medical Assistant Piter Salazar Ass't sent at 3/20/2025  7:05 AM PDT -----  I called the patient and she stated that she would like to get the breast ultrasound. Patient is requesting you order the breast ultrasound.  ----- Message -----  From: Asael Redd M.D.  Sent: 3/20/2025  12:02 AM PDT  To: [facility]    Please notify the patient that:  (1) her mammogram is negative but does show dense breast tissue which may decrease the accuracy of the mammogram  (2) she may obtain a screening breast ultrasound which could provide further detail  (3) [facility] offers the screening breast ultrasound, however some insurance plans may not cover the screening breast ultrasound  (4) if the screening breast ultrasound is not covered, typically the out-of-pocket expense is approximately $125  (5) if she would like me to order the screening breast ultrasound let me know, and I can submit that order to [facility] imaging, otherwise we can repeat the screening mammogram in 1 year

## 2025-04-17 ENCOUNTER — HOSPITAL ENCOUNTER (OUTPATIENT)
Dept: RADIOLOGY | Facility: MEDICAL CENTER | Age: 80
End: 2025-04-17
Attending: INTERNAL MEDICINE
Payer: MEDICARE

## 2025-04-17 DIAGNOSIS — R92.30 DENSE BREAST TISSUE ON MAMMOGRAM, UNSPECIFIED TYPE: ICD-10-CM

## 2025-04-17 PROCEDURE — 76641 ULTRASOUND BREAST COMPLETE: CPT

## 2025-04-18 ENCOUNTER — RESULTS FOLLOW-UP (OUTPATIENT)
Dept: MEDICAL GROUP | Facility: MEDICAL CENTER | Age: 80
End: 2025-04-18
Payer: MEDICARE

## 2025-04-28 DIAGNOSIS — J30.1 ALLERGIC RHINITIS DUE TO POLLEN, UNSPECIFIED SEASONALITY: ICD-10-CM

## 2025-04-29 NOTE — TELEPHONE ENCOUNTER
Received request via: Pharmacy    Was the patient seen in the last year in this department? Yes    Does the patient have an active prescription (recently filled or refills available) for medication(s) requested? No   Pharmacy Name: [facility]     Does the patient have nursing home Plus and need 100-day supply? (This applies to ALL medications) Patient does not have SCP

## 2025-04-30 ENCOUNTER — APPOINTMENT (OUTPATIENT)
Dept: URBAN - METROPOLITAN AREA CLINIC 4 | Facility: CLINIC | Age: 80
Setting detail: DERMATOLOGY
End: 2025-04-30

## 2025-04-30 DIAGNOSIS — L57.0 ACTINIC KERATOSIS: ICD-10-CM

## 2025-04-30 DIAGNOSIS — L82.1 OTHER SEBORRHEIC KERATOSIS: ICD-10-CM

## 2025-04-30 DIAGNOSIS — L20.89 OTHER ATOPIC DERMATITIS: ICD-10-CM | Status: IMPROVED

## 2025-04-30 DIAGNOSIS — L90.5 SCAR CONDITIONS AND FIBROSIS OF SKIN: ICD-10-CM

## 2025-04-30 DIAGNOSIS — D22 MELANOCYTIC NEVI: ICD-10-CM

## 2025-04-30 DIAGNOSIS — Z85.828 PERSONAL HISTORY OF OTHER MALIGNANT NEOPLASM OF SKIN: ICD-10-CM

## 2025-04-30 DIAGNOSIS — L81.4 OTHER MELANIN HYPERPIGMENTATION: ICD-10-CM | Status: STABLE

## 2025-04-30 PROBLEM — D48.5 NEOPLASM OF UNCERTAIN BEHAVIOR OF SKIN: Status: ACTIVE | Noted: 2025-04-30

## 2025-04-30 PROBLEM — D22.5 MELANOCYTIC NEVI OF TRUNK: Status: ACTIVE | Noted: 2025-04-30

## 2025-04-30 PROCEDURE — 17000 DESTRUCT PREMALG LESION: CPT

## 2025-04-30 PROCEDURE — 17003 DESTRUCT PREMALG LES 2-14: CPT

## 2025-04-30 PROCEDURE — 99213 OFFICE O/P EST LOW 20 MIN: CPT | Mod: 25

## 2025-04-30 PROCEDURE — ? LIQUID NITROGEN

## 2025-04-30 PROCEDURE — ? OBSERVATION

## 2025-04-30 PROCEDURE — ? DIAGNOSIS COMMENT

## 2025-04-30 PROCEDURE — ? COUNSELING

## 2025-04-30 PROCEDURE — ? DEFER

## 2025-04-30 PROCEDURE — ? ADDITIONAL NOTES

## 2025-04-30 PROCEDURE — ? PHOTO-DOCUMENTATION

## 2025-04-30 RX ORDER — FLUTICASONE PROPIONATE 50 MCG
2 SPRAY, SUSPENSION (ML) NASAL DAILY
Qty: 48 G | Refills: 3 | Status: SHIPPED | OUTPATIENT
Start: 2025-04-30

## 2025-04-30 ASSESSMENT — LOCATION DETAILED DESCRIPTION DERM
LOCATION DETAILED: RIGHT PROXIMAL DORSAL FOREARM
LOCATION DETAILED: LEFT PROXIMAL DORSAL FOREARM
LOCATION DETAILED: LEFT SUPERIOR UPPER BACK
LOCATION DETAILED: RIGHT ANTERIOR LATERAL DISTAL THIGH
LOCATION DETAILED: RIGHT CLAVICULAR SKIN
LOCATION DETAILED: RIGHT MEDIAL POSTERIOR ANKLE
LOCATION DETAILED: LEFT DISTAL DORSAL FOREARM
LOCATION DETAILED: RIGHT ANTERIOR PROXIMAL THIGH
LOCATION DETAILED: RIGHT SUPERIOR UPPER BACK
LOCATION DETAILED: LEFT CENTRAL LATERAL NECK
LOCATION DETAILED: RIGHT RADIAL DORSAL HAND
LOCATION DETAILED: RIGHT MEDIAL FRONTAL SCALP
LOCATION DETAILED: SUPERIOR LUMBAR SPINE
LOCATION DETAILED: RIGHT SUPERIOR PARIETAL SCALP
LOCATION DETAILED: RIGHT DISTAL PRETIBIAL REGION
LOCATION DETAILED: RIGHT PROXIMAL PRETIBIAL REGION
LOCATION DETAILED: LEFT ANTERIOR PROXIMAL THIGH
LOCATION DETAILED: LEFT PROXIMAL PRETIBIAL REGION
LOCATION DETAILED: MIDDLE STERNUM
LOCATION DETAILED: LEFT ULNAR DORSAL HAND
LOCATION DETAILED: LEFT CENTRAL ZYGOMA
LOCATION DETAILED: INFERIOR THORACIC SPINE

## 2025-04-30 ASSESSMENT — LOCATION SIMPLE DESCRIPTION DERM
LOCATION SIMPLE: LEFT ZYGOMA
LOCATION SIMPLE: RIGHT PRETIBIAL REGION
LOCATION SIMPLE: LEFT UPPER BACK
LOCATION SIMPLE: RIGHT CLAVICULAR SKIN
LOCATION SIMPLE: NECK
LOCATION SIMPLE: LEFT HAND
LOCATION SIMPLE: RIGHT HAND
LOCATION SIMPLE: RIGHT FOREARM
LOCATION SIMPLE: LEFT PRETIBIAL REGION
LOCATION SIMPLE: RIGHT SCALP
LOCATION SIMPLE: CHEST
LOCATION SIMPLE: SCALP
LOCATION SIMPLE: LEFT THIGH
LOCATION SIMPLE: RIGHT THIGH
LOCATION SIMPLE: RIGHT ANKLE
LOCATION SIMPLE: UPPER BACK
LOCATION SIMPLE: RIGHT UPPER BACK
LOCATION SIMPLE: LEFT FOREARM
LOCATION SIMPLE: LOWER BACK

## 2025-04-30 ASSESSMENT — LOCATION ZONE DERM
LOCATION ZONE: LEG
LOCATION ZONE: FACE
LOCATION ZONE: ARM
LOCATION ZONE: TRUNK
LOCATION ZONE: SCALP
LOCATION ZONE: NECK
LOCATION ZONE: HAND

## 2025-05-13 ENCOUNTER — HOSPITAL ENCOUNTER (OUTPATIENT)
Dept: LAB | Facility: MEDICAL CENTER | Age: 80
End: 2025-05-13
Attending: PHYSICIAN ASSISTANT
Payer: MEDICARE

## 2025-05-13 LAB
T4 FREE SERPL-MCNC: 1.23 NG/DL (ref 0.93–1.7)
TSH SERPL-ACNC: 11.3 UIU/ML (ref 0.38–5.33)

## 2025-05-13 PROCEDURE — 84439 ASSAY OF FREE THYROXINE: CPT

## 2025-05-13 PROCEDURE — 84443 ASSAY THYROID STIM HORMONE: CPT

## 2025-05-13 PROCEDURE — 36415 COLL VENOUS BLD VENIPUNCTURE: CPT

## 2025-07-01 ENCOUNTER — HOSPITAL ENCOUNTER (OUTPATIENT)
Dept: LAB | Facility: MEDICAL CENTER | Age: 80
End: 2025-07-01
Attending: PHYSICIAN ASSISTANT
Payer: MEDICARE

## 2025-07-01 LAB
T4 FREE SERPL-MCNC: 1.48 NG/DL (ref 0.93–1.7)
TSH SERPL-ACNC: 2.37 UIU/ML (ref 0.38–5.33)

## 2025-07-01 PROCEDURE — 84443 ASSAY THYROID STIM HORMONE: CPT

## 2025-07-01 PROCEDURE — 36415 COLL VENOUS BLD VENIPUNCTURE: CPT

## 2025-07-01 PROCEDURE — 84439 ASSAY OF FREE THYROXINE: CPT
